# Patient Record
Sex: FEMALE | Race: WHITE | NOT HISPANIC OR LATINO | Employment: UNEMPLOYED | URBAN - METROPOLITAN AREA
[De-identification: names, ages, dates, MRNs, and addresses within clinical notes are randomized per-mention and may not be internally consistent; named-entity substitution may affect disease eponyms.]

---

## 2017-01-09 ENCOUNTER — PATIENT MESSAGE (OUTPATIENT)
Dept: NEUROLOGY | Facility: CLINIC | Age: 37
End: 2017-01-09

## 2017-01-17 ENCOUNTER — LAB VISIT (OUTPATIENT)
Dept: LAB | Facility: HOSPITAL | Age: 37
End: 2017-01-17
Attending: PSYCHIATRY & NEUROLOGY
Payer: COMMERCIAL

## 2017-01-17 DIAGNOSIS — G35 MULTIPLE SCLEROSIS: ICD-10-CM

## 2017-01-17 DIAGNOSIS — Z79.899 HIGH RISK MEDICATION USE: ICD-10-CM

## 2017-01-17 LAB
ALBUMIN SERPL BCP-MCNC: 3.2 G/DL
ALP SERPL-CCNC: 55 U/L
ALT SERPL W/O P-5'-P-CCNC: 14 U/L
AST SERPL-CCNC: 16 U/L
BASOPHILS # BLD AUTO: 0.01 K/UL
BASOPHILS NFR BLD: 0.2 %
BILIRUB DIRECT SERPL-MCNC: 0.2 MG/DL
BILIRUB SERPL-MCNC: 0.5 MG/DL
DIFFERENTIAL METHOD: ABNORMAL
EOSINOPHIL # BLD AUTO: 0 K/UL
EOSINOPHIL NFR BLD: 0.4 %
ERYTHROCYTE [DISTWIDTH] IN BLOOD BY AUTOMATED COUNT: 12.7 %
HCT VFR BLD AUTO: 39 %
HGB BLD-MCNC: 13.1 G/DL
LYMPHOCYTES # BLD AUTO: 0.3 K/UL
LYMPHOCYTES NFR BLD: 6.3 %
MCH RBC QN AUTO: 31 PG
MCHC RBC AUTO-ENTMCNC: 33.6 %
MCV RBC AUTO: 92 FL
MONOCYTES # BLD AUTO: 0.8 K/UL
MONOCYTES NFR BLD: 14.7 %
NEUTROPHILS # BLD AUTO: 4.1 K/UL
NEUTROPHILS NFR BLD: 78.4 %
PLATELET # BLD AUTO: 290 K/UL
PMV BLD AUTO: 10.5 FL
PROT SERPL-MCNC: 6.5 G/DL
RBC # BLD AUTO: 4.23 M/UL
WBC # BLD AUTO: 5.25 K/UL

## 2017-01-17 PROCEDURE — 80076 HEPATIC FUNCTION PANEL: CPT

## 2017-01-17 PROCEDURE — 85025 COMPLETE CBC W/AUTO DIFF WBC: CPT

## 2017-01-17 PROCEDURE — 36415 COLL VENOUS BLD VENIPUNCTURE: CPT

## 2017-01-18 ENCOUNTER — PATIENT MESSAGE (OUTPATIENT)
Dept: NEUROLOGY | Facility: CLINIC | Age: 37
End: 2017-01-18

## 2017-01-19 ENCOUNTER — PATIENT MESSAGE (OUTPATIENT)
Dept: NEUROLOGY | Facility: CLINIC | Age: 37
End: 2017-01-19

## 2017-01-24 ENCOUNTER — PATIENT MESSAGE (OUTPATIENT)
Dept: NEUROLOGY | Facility: CLINIC | Age: 37
End: 2017-01-24

## 2017-02-09 ENCOUNTER — LAB VISIT (OUTPATIENT)
Dept: LAB | Facility: HOSPITAL | Age: 37
End: 2017-02-09
Attending: PSYCHIATRY & NEUROLOGY
Payer: COMMERCIAL

## 2017-02-09 DIAGNOSIS — G35 MULTIPLE SCLEROSIS: ICD-10-CM

## 2017-02-09 DIAGNOSIS — Z79.899 HIGH RISK MEDICATION USE: ICD-10-CM

## 2017-02-09 LAB
ALBUMIN SERPL BCP-MCNC: 3.2 G/DL
ALP SERPL-CCNC: 52 U/L
ALT SERPL W/O P-5'-P-CCNC: 11 U/L
AST SERPL-CCNC: 15 U/L
BASOPHILS # BLD AUTO: 0.02 K/UL
BASOPHILS NFR BLD: 0.6 %
BILIRUB DIRECT SERPL-MCNC: 0.1 MG/DL
BILIRUB SERPL-MCNC: 0.4 MG/DL
DIFFERENTIAL METHOD: ABNORMAL
EOSINOPHIL # BLD AUTO: 0 K/UL
EOSINOPHIL NFR BLD: 0.9 %
ERYTHROCYTE [DISTWIDTH] IN BLOOD BY AUTOMATED COUNT: 12.8 %
HCT VFR BLD AUTO: 39.8 %
HGB BLD-MCNC: 13.3 G/DL
LYMPHOCYTES # BLD AUTO: 0.4 K/UL
LYMPHOCYTES NFR BLD: 12.7 %
MCH RBC QN AUTO: 31.1 PG
MCHC RBC AUTO-ENTMCNC: 33.4 %
MCV RBC AUTO: 93 FL
MONOCYTES # BLD AUTO: 0.5 K/UL
MONOCYTES NFR BLD: 15.7 %
NEUTROPHILS # BLD AUTO: 2.3 K/UL
NEUTROPHILS NFR BLD: 70.1 %
PLATELET # BLD AUTO: 259 K/UL
PMV BLD AUTO: 10.6 FL
PROT SERPL-MCNC: 6.5 G/DL
RBC # BLD AUTO: 4.28 M/UL
WBC # BLD AUTO: 3.31 K/UL

## 2017-02-09 PROCEDURE — 36415 COLL VENOUS BLD VENIPUNCTURE: CPT

## 2017-02-09 PROCEDURE — 80076 HEPATIC FUNCTION PANEL: CPT

## 2017-02-09 PROCEDURE — 85025 COMPLETE CBC W/AUTO DIFF WBC: CPT

## 2017-02-13 ENCOUNTER — PATIENT MESSAGE (OUTPATIENT)
Dept: NEUROLOGY | Facility: CLINIC | Age: 37
End: 2017-02-13

## 2017-02-14 ENCOUNTER — PATIENT MESSAGE (OUTPATIENT)
Dept: NEUROLOGY | Facility: CLINIC | Age: 37
End: 2017-02-14

## 2017-03-14 ENCOUNTER — LAB VISIT (OUTPATIENT)
Dept: LAB | Facility: HOSPITAL | Age: 37
End: 2017-03-14
Attending: PSYCHIATRY & NEUROLOGY
Payer: COMMERCIAL

## 2017-03-14 ENCOUNTER — OFFICE VISIT (OUTPATIENT)
Dept: NEUROLOGY | Facility: CLINIC | Age: 37
End: 2017-03-14
Payer: COMMERCIAL

## 2017-03-14 VITALS
WEIGHT: 152.19 LBS | BODY MASS INDEX: 24.46 KG/M2 | HEART RATE: 88 BPM | SYSTOLIC BLOOD PRESSURE: 131 MMHG | HEIGHT: 66 IN | DIASTOLIC BLOOD PRESSURE: 91 MMHG

## 2017-03-14 DIAGNOSIS — G35 MS (MULTIPLE SCLEROSIS): ICD-10-CM

## 2017-03-14 DIAGNOSIS — Z79.899 ENCOUNTER FOR LONG-TERM (CURRENT) USE OF HIGH-RISK MEDICATION: ICD-10-CM

## 2017-03-14 DIAGNOSIS — Z71.89 COUNSELING REGARDING GOALS OF CARE: ICD-10-CM

## 2017-03-14 DIAGNOSIS — Z79.899 HIGH RISK MEDICATION USE: ICD-10-CM

## 2017-03-14 DIAGNOSIS — M79.671 PAIN IN BOTH FEET: Primary | ICD-10-CM

## 2017-03-14 DIAGNOSIS — G35 MULTIPLE SCLEROSIS: ICD-10-CM

## 2017-03-14 DIAGNOSIS — Z29.89 PROPHYLACTIC IMMUNOTHERAPY: ICD-10-CM

## 2017-03-14 DIAGNOSIS — M79.672 PAIN IN BOTH FEET: Primary | ICD-10-CM

## 2017-03-14 LAB
ALBUMIN SERPL BCP-MCNC: 3.2 G/DL
ALP SERPL-CCNC: 52 U/L
ALT SERPL W/O P-5'-P-CCNC: 11 U/L
AST SERPL-CCNC: 14 U/L
BASOPHILS # BLD AUTO: 0.01 K/UL
BASOPHILS NFR BLD: 0.2 %
BILIRUB DIRECT SERPL-MCNC: 0.2 MG/DL
BILIRUB SERPL-MCNC: 0.4 MG/DL
DIFFERENTIAL METHOD: ABNORMAL
EOSINOPHIL # BLD AUTO: 0 K/UL
EOSINOPHIL NFR BLD: 1 %
ERYTHROCYTE [DISTWIDTH] IN BLOOD BY AUTOMATED COUNT: 13 %
HCT VFR BLD AUTO: 38.7 %
HGB BLD-MCNC: 13 G/DL
LYMPHOCYTES # BLD AUTO: 0.4 K/UL
LYMPHOCYTES NFR BLD: 9.9 %
MCH RBC QN AUTO: 30.6 PG
MCHC RBC AUTO-ENTMCNC: 33.6 %
MCV RBC AUTO: 91 FL
MONOCYTES # BLD AUTO: 0.8 K/UL
MONOCYTES NFR BLD: 19.2 %
NEUTROPHILS # BLD AUTO: 2.8 K/UL
NEUTROPHILS NFR BLD: 69.2 %
PLATELET # BLD AUTO: 299 K/UL
PMV BLD AUTO: 10.2 FL
PROT SERPL-MCNC: 6.4 G/DL
RBC # BLD AUTO: 4.25 M/UL
WBC # BLD AUTO: 4.06 K/UL

## 2017-03-14 PROCEDURE — 80076 HEPATIC FUNCTION PANEL: CPT

## 2017-03-14 PROCEDURE — 36415 COLL VENOUS BLD VENIPUNCTURE: CPT

## 2017-03-14 PROCEDURE — 99999 PR PBB SHADOW E&M-EST. PATIENT-LVL III: CPT | Mod: PBBFAC,,, | Performed by: PSYCHIATRY & NEUROLOGY

## 2017-03-14 PROCEDURE — 99215 OFFICE O/P EST HI 40 MIN: CPT | Mod: S$GLB,,, | Performed by: PSYCHIATRY & NEUROLOGY

## 2017-03-14 PROCEDURE — 85025 COMPLETE CBC W/AUTO DIFF WBC: CPT

## 2017-03-14 PROCEDURE — 1160F RVW MEDS BY RX/DR IN RCRD: CPT | Mod: S$GLB,,, | Performed by: PSYCHIATRY & NEUROLOGY

## 2017-03-14 RX ORDER — MULTIVIT WITH MINERALS/HERBS
1 TABLET ORAL DAILY
COMMUNITY

## 2017-03-14 RX ORDER — LISINOPRIL 40 MG/1
40 TABLET ORAL DAILY
Refills: 5 | COMMUNITY
Start: 2017-03-09

## 2017-03-14 NOTE — PROGRESS NOTES
"Subjective:       Patient ID: Camila Dawn is a 36 y.o. female who presents today for a routine clinic visit for MS.    MS HPI:  · DMT: Gilenya 5 days/week (skips the weekend)  · Side effects from DMT? No  · Taking vitamin D3 as recommended? Yes -  Dose:  4,000 IU daily  · She has developed a crick in the back of her right neck--slept on it wrong. She is reluctant to start a muscle relaxer at this time. Plans to get a massage  · She has recently started a new BP med--lisinopril.   · Has not worked past 1.5 years. Is a CPA, and also licensed in fraud auditing.     SOCIAL HISTORY  Social History   Substance Use Topics    Smoking status: Never Smoker    Smokeless tobacco: Not on file    Alcohol use Not on file     Living arrangements - the patient lives with her .  Employment N/A    MS ROS:  · Fatigue: Yes - has narcolepsy; on Xyrem; on Ritalin  · Sleep Disturbance: Yes - narcolepsy;   · Bladder Dysfunction: No  · Bowel Dysfunction: No  · Spasticity: No  · Visual Symptoms: No--will see Dr. Preston in May;   · Cognitive: Yes -   · Mood Disorder: Yes -   · Gait Disturbance: No  · Falls: No  · Hand Dysfunction: No  · Pain: Yes - has "aches and pains"; her feet ache "all the time". "been like this forever."  · Sexual Dysfunction: Not Assessed  · Skin Breakdown: No  · Tremors: No  · Dysphagia:  No  · Dysarthria:  No  · Heat sensitivity:  Yes - some  · Any un-met adaptive needs? No  · Copay Assist?  Yes -   · Clinical Trial candidate? No        Objective:      25 foot timed walk: 3.3 seconds without assist;   Timed 25 Foot Walk: 11/14/2016   Did patient wear an AFO? No   Was assistive device used? No   Time for 25 Foot Walk (seconds) 3.4   Time for 25 Foot Walk (seconds) 3.2     Neurologic Exam      MENTAL STATUS: grossly intact  CRANIAL NERVE EXAM: There is no internuclear ophthalmoplegia. Extraocular   muscles are intact.  No facial   asymmetry.There is no dysarthria.   MOTOR EXAM: Normal bulk and tone " throughout UE and LE bilaterally. Rapid sequential movements are normal. Strength is 5/5 in all groups   in the lower extremities and upper extremities.   REFLEXES: Symmetric and 2+ throughout in all 4 extremities.   SENSORY EXAM: Normal to vibration t/o  COORDINATION: Normal finger-to-nose exam.   GAIT: Narrow based and stable.        Labs:     Lab Results   Component Value Date    WBC 4.06 03/14/2017    HGB 13.0 03/14/2017    HCT 38.7 03/14/2017    MCV 91 03/14/2017     03/14/2017       Lab Results   Component Value Date    [id number] 57 11/14/2016     Diagnosis/Assessment/Plan:    1. Multiple Sclerosis  · Assessment: She is clinically stable on Gilenya 5 days/week.   · Imaging: planned in June 2017  · Disease Modifying Therapies: continue Gilenya; we discussed ocrelizumab due to long term safety concerns with Gilenya, but she prefers to stay course with Gilenya for now.     2. MS Symptom Assessment / Management  · Sleep Disturbance: managed by sleep medicine  · Visual Symptoms: will see Dr. Preston in May  · Pain: continue to monitor        3. Neck spasm--she defers muscle relaxer currently, and will try massage. If this not helpful, she will contact us, and we can prescribe valium or baclofen.       Over 50% of this 40 minute visit was spent in direct face to face counseling of the patient about her MS and the management of her various symptoms.       F/u with Nicole Barba PA-C in 4mo  Pain in both feet  -     Ambulatory Referral to Podiatry    MS (multiple sclerosis)  -     CBC auto differential; Future; Expected date: 3/14/17  -     Hepatic function panel; Future  -     MRI Brain W WO Contrast; Future

## 2017-03-14 NOTE — MR AVS SNAPSHOT
[facility]  [address]  [facility] [address]  Phone: [phone]                  Camila Dawn   3/14/2017 3:00 PM   Office Visit    Description:  Female : 1980   Provider:  Jazmine Lundy MD   Department:  [facility]           Reason for Visit     Medication Problem           Diagnoses this Visit        Comments    Pain in both feet    -  Primary     MS (multiple sclerosis)                To Do List           Future Appointments        Provider Department Dept Phone    3/14/2017 4:45 PM LAB, [facility] [phone]    2017 9:00 AM Devan Preston MD [facility] - Ophthalmology [phone]    2017 9:15 AM STA MRI1 [facility] [phone]    2017 1:45 PM Nicole Barba PA-C [facility] Multiple Sclerosis [phone]      Goals (5 Years of Data)     None      Follow-Up and Disposition     Return in about 4 months (around 2017).      [facility]     [facility] Nurse Care Line -  Assistance  Registered nurses in the [facility] provide clinical advisement, health education, appointment booking, and other advisory services.  Call for this free service at [phone].             Medications           Message regarding Medications     Verify the changes and/or additions to your medication regime listed below are the same as discussed with your clinician today.  If any of these changes or additions are incorrect, please notify your healthcare provider.             Verify that the below list of medications is an accurate representation of the medications you are currently taking.  If none reported, the list may be blank. If incorrect, please contact your healthcare provider. Carry this list with you in case of emergency.           Current Medications     b complex vitamins tablet Take 1 tablet by mouth once daily.    cetirizine (ZYRTEC) 5 MG tablet Take 5  "mg by mouth once daily.    fingolimod (GILENYA) 0.5 mg Cap Take 0.5 mg by mouth once daily.    FLAXSEED ORAL Take by mouth.    lisinopril (PRINIVIL,ZESTRIL) 40 MG tablet     METFORMIN HCL (METFORMIN ORAL) Take by mouth.    methylphenidate (RITALIN) 10 MG tablet Take 10 mg by mouth 2 (two) times daily. Takes 3 AM, 2 late morning and 2 mid afternoon    multivitamin (ONE DAILY MULTIVITAMIN) per tablet Take 1 tablet by mouth once daily.    naproxen (NAPROSYN) 250 MG tablet Take 250 mg by mouth 2 (two) times daily.    sodium oxybate (XYREM) 500 mg/mL Soln Take by mouth.    triamcinolone acetonide 0.1% (KENALOG) 0.1 % cream Apply topically continuous prn.     vitamin D 1000 units Tab Take 4,000 Units by mouth once daily.            Clinical Reference Information           Your Vitals Were     BP Pulse Height Weight BMI    131/91 88 5' 6" (1.676 m) 69 kg (152 lb 3.2 oz) 24.57 kg/m2      Blood Pressure          Most Recent Value    BP  (!)  131/91      Allergies as of 3/14/2017     Ragweed    Sulfa (Sulfonamide Antibiotics)      Immunizations Administered on Date of Encounter - 3/14/2017     None      Orders Placed During Today's Visit      Normal Orders This Visit    Ambulatory Referral to Podiatry     Future Labs/Procedures Expected by Expires    CBC auto differential  3/14/2017 5/13/2018    Hepatic function panel  As directed 3/14/2018    MRI Brain W WO Contrast  As directed 6/14/2018      Language Assistance Services     ATTENTION: Language assistance services are available, free of charge. Please call [phone].      ATENCIÓN: Si ros sumner, tiene a castellanos disposición servicios gratuitos de asistencia lingüística. Llame al [phone].     RIP Ý: N?u b?n nói Ti?ng Vi?t, có các d?ch v? h? tr? ngôn ng? mi?n phí dành cho b?n. G?i s? [phone].         [facility] complies with applicable Federal civil rights laws and does not discriminate on the basis of race, color, national origin, age, " disability, or sex.

## 2017-03-15 ENCOUNTER — PATIENT MESSAGE (OUTPATIENT)
Dept: NEUROLOGY | Facility: CLINIC | Age: 37
End: 2017-03-15

## 2017-03-15 NOTE — TELEPHONE ENCOUNTER
"I don't have any documentation on the "2017 CD Upload Spreadsheet" showing that we received MRI disc.  I have verified with the Film Library that abt was sent for upload   "

## 2017-03-15 NOTE — TELEPHONE ENCOUNTER
----- Message from Jazmine Lundy MD sent at 3/14/2017  4:30 PM CDT -----  meaghan Rios checking your log to see if we received outside MRIs on Camila Dawn? thanks

## 2017-03-16 DIAGNOSIS — G35 MULTIPLE SCLEROSIS: Primary | ICD-10-CM

## 2017-03-16 RX ORDER — FINGOLIMOD HYDROCHLORIDE 0.5 MG/1
0.5 CAPSULE ORAL DAILY
Qty: 180 CAPSULE | Refills: 1 | Status: SHIPPED | OUTPATIENT
Start: 2017-03-16 | End: 2018-04-04 | Stop reason: SDUPTHER

## 2017-03-20 LAB
JCPYV AB SERPL QL IA: NEGATIVE
JCV INDEX: 0.17

## 2017-03-21 ENCOUNTER — PATIENT MESSAGE (OUTPATIENT)
Dept: NEUROLOGY | Facility: CLINIC | Age: 37
End: 2017-03-21

## 2017-03-29 ENCOUNTER — PATIENT MESSAGE (OUTPATIENT)
Dept: NEUROLOGY | Facility: CLINIC | Age: 37
End: 2017-03-29

## 2017-03-30 ENCOUNTER — DOCUMENTATION ONLY (OUTPATIENT)
Dept: NEUROLOGY | Facility: CLINIC | Age: 37
End: 2017-03-30

## 2017-03-30 NOTE — PROGRESS NOTES
Informed patient that we have received Brain MRI report/disc from [facility] on 3/30/2017.  Placed on KK desk.

## 2017-04-03 NOTE — PROGRESS NOTES
Disc received from [facility]  MRI brain from 6/2016, 12/2015, 6/2015, and 10/2014  Will send for upload into EPIC

## 2017-04-06 ENCOUNTER — PATIENT MESSAGE (OUTPATIENT)
Dept: NEUROLOGY | Facility: CLINIC | Age: 37
End: 2017-04-06

## 2017-04-11 ENCOUNTER — TELEPHONE (OUTPATIENT)
Dept: NEUROLOGY | Facility: CLINIC | Age: 37
End: 2017-04-11

## 2017-04-11 ENCOUNTER — PATIENT MESSAGE (OUTPATIENT)
Dept: NEUROLOGY | Facility: CLINIC | Age: 37
End: 2017-04-11

## 2017-04-11 NOTE — TELEPHONE ENCOUNTER
Camila sent the following:      Dr. Yamil Huffman has changed my prescription to Modafinil instead of Methylphenidate. The insurance plan I'm on finally covers it. I will start taking it tomorrow. I'm hoping this means my BP will fall, and then I won't even need the Lisinopril anymore. I will still be keeping track of my BP. I will keep you updated if anything else changes or issues.   Thanks!

## 2017-04-12 RX ORDER — MODAFINIL 200 MG/1
200 TABLET ORAL DAILY
COMMUNITY
End: 2021-09-09 | Stop reason: SDUPTHER

## 2017-05-09 ENCOUNTER — CLINICAL SUPPORT (OUTPATIENT)
Dept: OPHTHALMOLOGY | Facility: CLINIC | Age: 37
End: 2017-05-09
Payer: COMMERCIAL

## 2017-05-09 ENCOUNTER — INITIAL CONSULT (OUTPATIENT)
Dept: OPHTHALMOLOGY | Facility: CLINIC | Age: 37
End: 2017-05-09
Payer: COMMERCIAL

## 2017-05-09 DIAGNOSIS — G35 MULTIPLE SCLEROSIS: Primary | ICD-10-CM

## 2017-05-09 DIAGNOSIS — H47.293 PARTIAL OPTIC ATROPHY OF BOTH EYES: ICD-10-CM

## 2017-05-09 DIAGNOSIS — Z79.899 LONG-TERM CURRENT USE OF HIGH RISK MEDICATION OTHER THAN ANTICOAGULANT: ICD-10-CM

## 2017-05-09 PROCEDURE — 92083 EXTENDED VISUAL FIELD XM: CPT | Mod: S$GLB,,, | Performed by: OPHTHALMOLOGY

## 2017-05-09 PROCEDURE — 99999 PR PBB SHADOW E&M-EST. PATIENT-LVL II: CPT | Mod: PBBFAC,,, | Performed by: OPHTHALMOLOGY

## 2017-05-09 PROCEDURE — 92004 COMPRE OPH EXAM NEW PT 1/>: CPT | Mod: S$GLB,,, | Performed by: OPHTHALMOLOGY

## 2017-05-09 PROCEDURE — 92133 CPTRZD OPH DX IMG PST SGM ON: CPT | Mod: S$GLB,,, | Performed by: OPHTHALMOLOGY

## 2017-05-09 RX ORDER — METFORMIN HYDROCHLORIDE 1000 MG/1
TABLET ORAL
Refills: 5 | COMMUNITY
Start: 2017-03-05

## 2017-05-09 RX ORDER — DROSPIRENONE AND ETHINYL ESTRADIOL 0.03MG-3MG
KIT ORAL
Refills: 5 | COMMUNITY
Start: 2017-03-23 | End: 2017-07-18

## 2017-05-09 NOTE — MR AVS SNAPSHOT
[facility]  [address]  [facility] [address]  Phone: [phone]  Fax: [phone]                  Camila Dawn   2017 9:00 AM   Initial consult    Description:  Female : 1980   Provider:  Devan Preston MD   Department:  [facility]           Reason for Visit     Concerns About Ocular Health           Diagnoses this Visit        Comments    Multiple sclerosis    -  Primary     Partial optic atrophy of both eyes         Long-term current use of high risk medication other than anticoagulant                To Do List           Future Appointments        Provider Department Dept Phone    2017 9:15 AM [facility] MRI1 [facility] [phone]    2017 1:45 PM [facility] [phone]      Goals (5 Years of Data)     None      Follow-Up and Disposition     Return in about 1 year (around 2018) for Exam, HVF, and macular oct.      [facility]     [facility] Care [facility] -  Assistance  Unless otherwise directed by your provider, please contact [facility], our nurse care line that is available for  assistance.     Registered nurses in the [facility] provide: appointment scheduling, clinical advisement, health education, and other advisory services.  Call: [phone] (toll free)               Medications           Message regarding Medications     Verify the changes and/or additions to your medication regime listed below are the same as discussed with your clinician today.  If any of these changes or additions are incorrect, please notify your healthcare provider.             Verify that the below list of medications is an accurate representation of the medications you are currently taking.  If none reported, the list may be blank. If incorrect, please contact your healthcare provider. Carry this list with you in case of emergency.           Current  Medications     b complex vitamins tablet Take 1 tablet by mouth once daily.    cetirizine (ZYRTEC) 5 MG tablet Take 5 mg by mouth once daily.    fingolimod (GILENYA) 0.5 mg Cap Take 1 capsule (0.5 mg total) by mouth once daily.    FLAXSEED ORAL Take by mouth.    lisinopril (PRINIVIL,ZESTRIL) 40 MG tablet     metformin (GLUCOPHAGE) 1000 MG tablet TK 1 T PO  BID    modafinil (PROVIGIL) 200 MG Tab Take 200 mg by mouth once daily. Pt takes 100 mg PO Q AM and 100 mg PO at noon.    multivitamin (ONE DAILY MULTIVITAMIN) per tablet Take 1 tablet by mouth once daily.    naproxen (NAPROSYN) 250 MG tablet Take 250 mg by mouth 2 (two) times daily.    sodium oxybate (XYREM) 500 mg/mL Soln Take by mouth.    triamcinolone acetonide 0.1% (KENALOG) 0.1 % cream Apply topically continuous prn.     vitamin D 1000 units Tab Take 4,000 Units by mouth once daily.     GUS 3-0.03 mg per tablet TK 1 T PO QD           Clinical Reference Information           Allergies as of 5/9/2017     Ragweed    Sulfa (Sulfonamide Antibiotics)      Immunizations Administered on Date of Encounter - 5/9/2017     None      Orders Placed During Today's Visit      Normal Orders This Visit    Hoover Visual Field - OU - Extended - Both Eyes     Posterior Segment OCT Optic Nerve- Both eyes       Instructions    Repeat exam, visual field testing, and OCT in one year.       Language Assistance Services     ATTENTION: Language assistance services are available, free of charge. Please call [phone].      ATENCIÓN: Si ros sumner, tiene a castellanos disposición servicios gratuitos de asistencia lingüística. Llame al [phone].     [facility] Ý: N?u b?n nói Ti?ng Vi?t, có các d?ch v? h? tr? ngôn ng? mi?n phí dành cho b?n. G?i s? [phone].         [facility] complies with applicable Federal civil rights laws and does not discriminate on the basis of race, color, national origin, age, disability, or sex.

## 2017-05-09 NOTE — PROGRESS NOTES
HPI     Concerns About Ocular Health    Additional comments: Multiple Sclerosis           Comments   Referred by Dr.Bagert Silveira Evaluation  Patient here establishing care moving from [place]. Dx w/multiple sclerosis   since 2010. 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   Pt states taking Gilenya past year an half without any problems at this   time.  Hx of OS loss vision left   Peripherally which all came back.  No eye drops. No pain.    Notes in Media from [facility].    I have personally interviewed the patient, reviewed the history and   examined the patient and agree with the technician's exam.           Last edited by Devan Preston MD on 5/9/2017 10:35 AM. (History)        ROS     Positive for: Neurological, Eyes    Negative for: Constitutional, Gastrointestinal, Skin, Genitourinary,   Musculoskeletal, HENT, Endocrine, Cardiovascular, Respiratory,   Psychiatric, Allergic/Imm, Heme/Lymph    Last edited by Devan Preston MD on 5/9/2017 10:24 AM. (History)        Assessment /Plan     For exam results, see Encounter Report.    Multiple sclerosis  -     Posterior Segment OCT Optic Nerve- Both eyes  -     Hoover Visual Field - OU - Extended - Both Eyes    Partial optic atrophy of both eyes  -     Posterior Segment OCT Optic Nerve- Both eyes  -     Hoover Visual Field - OU - Extended - Both Eyes    Long-term current use of high risk medication other than anticoagulant  -     Posterior Segment OCT Optic Nerve- Both eyes      No evidence of Gilenya toxicity to retinas. Old partial optic atrophy in both eyes unchanged from prior exam in [place]. Repeat exam and OCT in one year.

## 2017-05-09 NOTE — LETTER
May 9, 2017      Jazmine Lundy MD  [address]  [facility] [address]           [facility] - Ophthalmology  [address]  [facility] [address]  Phone: [phone]  Fax: [phone]          Patient: Camila Dawn   MR Number: [id number]   YOB: 1980   Date of Visit: 5/9/2017       Dear Dr. Jazmine Lundy:    Thank you for referring Camila Dawn to me for evaluation. Attached you will find relevant portions of my assessment and plan of care.    If you have questions, please do not hesitate to call me. I look forward to following Camila Dawn along with you.    Sincerely,    Devan Preston MD    Enclosure  CC:  No Recipients    If you would like to receive this communication electronically, please contact [email or web address] or [phone] to request more information on [employer] access.    For providers and/or their staff who would like to refer a patient to [facility], please contact us through our one-stop-shop provider referral line, [facility], at [phone].    If you feel you have received this communication in error or would no longer like to receive these types of communications, please e-mail [email or web address]

## 2017-06-13 ENCOUNTER — PATIENT MESSAGE (OUTPATIENT)
Dept: NEUROLOGY | Facility: CLINIC | Age: 37
End: 2017-06-13

## 2017-06-14 ENCOUNTER — HOSPITAL ENCOUNTER (OUTPATIENT)
Dept: RADIOLOGY | Facility: HOSPITAL | Age: 37
Discharge: HOME | End: 2017-06-14
Attending: PSYCHIATRY & NEUROLOGY
Payer: COMMERCIAL

## 2017-06-14 DIAGNOSIS — G35 MS (MULTIPLE SCLEROSIS): ICD-10-CM

## 2017-06-14 PROCEDURE — 70553 MRI BRAIN STEM W/O & W/DYE: CPT | Mod: 26,,, | Performed by: RADIOLOGY

## 2017-06-14 PROCEDURE — 70553 MRI BRAIN STEM W/O & W/DYE: CPT | Mod: TC

## 2017-06-14 PROCEDURE — A9585 GADOBUTROL INJECTION: HCPCS | Performed by: PSYCHIATRY & NEUROLOGY

## 2017-06-14 PROCEDURE — 25500020 PHARM REV CODE 255: Performed by: PSYCHIATRY & NEUROLOGY

## 2017-06-14 RX ORDER — GADOBUTROL 604.72 MG/ML
6 INJECTION INTRAVENOUS
Status: COMPLETED | OUTPATIENT
Start: 2017-06-14 | End: 2017-06-14

## 2017-06-14 RX ADMIN — GADOBUTROL 6 ML: 604.72 INJECTION INTRAVENOUS at 09:06

## 2017-07-05 ENCOUNTER — PATIENT MESSAGE (OUTPATIENT)
Dept: NEUROLOGY | Facility: CLINIC | Age: 37
End: 2017-07-05

## 2017-07-18 ENCOUNTER — OFFICE VISIT (OUTPATIENT)
Dept: NEUROLOGY | Facility: CLINIC | Age: 37
End: 2017-07-18
Payer: COMMERCIAL

## 2017-07-18 VITALS
SYSTOLIC BLOOD PRESSURE: 132 MMHG | HEART RATE: 75 BPM | HEIGHT: 66 IN | WEIGHT: 155 LBS | DIASTOLIC BLOOD PRESSURE: 86 MMHG | BODY MASS INDEX: 24.91 KG/M2

## 2017-07-18 DIAGNOSIS — E55.9 VITAMIN D INSUFFICIENCY: ICD-10-CM

## 2017-07-18 DIAGNOSIS — G35 MULTIPLE SCLEROSIS: Primary | ICD-10-CM

## 2017-07-18 PROCEDURE — 3008F BODY MASS INDEX DOCD: CPT | Mod: S$GLB,,, | Performed by: PHYSICIAN ASSISTANT

## 2017-07-18 PROCEDURE — 99214 OFFICE O/P EST MOD 30 MIN: CPT | Mod: S$GLB,,, | Performed by: PHYSICIAN ASSISTANT

## 2017-07-18 PROCEDURE — 99999 PR PBB SHADOW E&M-EST. PATIENT-LVL III: CPT | Mod: PBBFAC,,, | Performed by: PHYSICIAN ASSISTANT

## 2017-07-18 RX ORDER — NORGESTIMATE AND ETHINYL ESTRADIOL 7DAYSX3 LO
1 KIT ORAL DAILY
COMMUNITY
End: 2017-07-20

## 2017-07-18 NOTE — PROGRESS NOTES
"Subjective:       Patient ID: Camila Dawn is a 37 y.o. female who presents today with her  for a routine clinic visit for MS.    MS HPI:  · DMT: Gilenya(M-F)  · Side effects from DMT? No  · Taking vitamin D3 as recommended? Yes Dose: 4,000  · Feels stable    SOCIAL HISTORY  Social History   Substance Use Topics    Smoking status: Never Smoker    Smokeless tobacco: Not on file    Alcohol use Yes      Comment: rarely     Living arrangements - with   Employment - Not at this time.    MS ROS:  · Fatigue: Yes - nacolepsy, taking xyrem, Provigil  · Bladder Dysfunction: No  · Bowel Dysfunction: No  · Spasticity: No  · Visual Symptoms: Yes - history of ON--sees Dr. Preston for Gilenya management.(last seen May 2017)  · Cognitive: No  · Mood Disorder: No  · Gait Disturbance: No  · Falls: No  · Hand Dysfunction: No  · Pain: Yes - "aches and pains"  · Sexual Dysfunction: Not Assessed  · Skin Breakdown: No  · Tremors: No  · Dysphagia:  No  · Dysarthria:  No  · Heat sensitivity:  Yes---limits outdoor activity  · Any un-met adaptive needs? No  · Copay Assist?  Yes   · Clinical Trial candidate? No      Objective:        1. 25 foot timed walk: 3.3s  Timed 25 Foot Walk: 11/14/2016 7/18/2017   Did patient wear an AFO? No No   Was assistive device used? No No   Time for 25 Foot Walk (seconds) 3.4 3.6   Time for 25 Foot Walk (seconds) 3.2 -       3. 9 Hole Peg Test:  9-Hole Peg Test: 11/14/2016   Dominant Hand Right   Time (seconds) -  Trial 1 18   Time (seconds) -  Trial 2 17   Time (seconds) - ND Trial 1 20   Time (seconds) - ND Trial 2 19       Neurologic Exam     Mental Status   Oriented to person, place, and time.   Follows 3 step commands.   Speech: speech is normal   Level of consciousness: alert  Normal comprehension.     Cranial Nerves     CN II   Visual acuity: normal with correction (20/20 OD and OS correctedwith Snellen hand held chart at 6 ft)    CN III, IV, VI   Pupils are equal, round, and " reactive to light.  Extraocular motions are normal.     CN V   Facial sensation intact.     CN VII   Facial expression full, symmetric.     CN VIII   Hearing: intact (finger rub)    CN IX, X   Palate: symmetric    CN XI   CN XI normal.     CN XII   Tongue deviation: none    Motor Exam   Muscle bulk: normal  Overall muscle tone: normal    Strength   Right deltoid: 5/5  Left deltoid: 5/5  Right triceps: 5/5  Left triceps: 5/5  Right wrist extension: 5/5  Left wrist extension: 5/5  Right interossei: 5/5  Left interossei: 5/5  Right iliopsoas: 5/5  Left iliopsoas: 5/5  Right hamstrin/5  Left hamstrin/5  Right anterior tibial: 5/5  Left anterior tibial: 5/5  Right peroneal: 5/5  Left peroneal: 5/5    Sensory Exam   Light touch normal.   Right arm vibration: normal  Left arm vibration: normal  Right leg vibration: decreased from toes  Left leg vibration: decreased from toes    Gait, Coordination, and Reflexes     Gait  Gait: normal    Coordination   Finger to nose coordination: normal  Tandem walking coordination: normal    Tremor   Resting tremor: absent  Action tremor: absent    Reflexes   Right brachioradialis: 2+  Left brachioradialis: 2+  Right biceps: 2+  Left biceps: 2+  Right triceps: 2+  Left triceps: 2+  Right patellar: 2+  Left patellar: 2+  Right achilles: 2+  Left achilles: 2+  Right plantar: normal  Left plantar: normal  Right ankle clonus: absent  Left ankle clonus: absent  Right pendular knee jerk: absent  Left pendular knee jerk: absentNormal Heel/toe walk  Normal RSM             Imaging:     Results for orders placed during the hospital encounter of 17   MRI Brain W WO Contrast    Narrative Comparison: 2016    Technique: Multiplanar multisequence images of the brain were obtained without and with contrast enhancement in the MS protocol.    Results: No abnormal diffusion restriction is identified to suggest an acute infarction and no abnormal gradient susceptibility is identified.   Again noted are foci of T2/flair signal abnormality within the supratentorial white matter, with the largest lesion adjacent to the posterior margin of the corpus callosum measuring 1.2 cm, stable.  These foci of signal abnormality are oriented perpendicular to the ventricular system and there is signal abnormality in the corpus callosum.  Overall, the findings are unchanged as compared to the prior study of 6/17/2016.  No infratentorial signal abnormality is identified.  No associated lesional enhancement is identified.  No mass or midline shift is seen.  The ventricles are stable in size and configuration without hydrocephalus.  No extra-axial fluid collections.  The visualized paranasal sinuses including the mastoid air cells are clear.  Expected intracranial flow voids are demonstrated.    Impression Scattered foci of T2/flair signal on amount in the supratentorial white matter, oriented perpendicular to the ventricular system, without evidence for postcontrast enhancement.  In a patient with a history of multiple sclerosis, findings are suggestive for demyelinating plaque.  No enhancement is seen to suggest active demyelination and no new lesion is detected.  Overall, the plaque burden is unchanged as compared to the previous study of 2016.      Electronically signed by: Laura Johansen MD  Date:     06/14/17  Time:    11:18          Labs:   Ordered CBC, Vit D, CMP      Lab Results   Component Value Date    WBC 4.06 03/14/2017    RBC 4.25 03/14/2017    HGB 13.0 03/14/2017    HCT 38.7 03/14/2017    MCV 91 03/14/2017    MCH 30.6 03/14/2017    MCHC 33.6 03/14/2017    RDW 13.0 03/14/2017     03/14/2017    MPV 10.2 03/14/2017    GRAN 2.8 03/14/2017    GRAN 69.2 03/14/2017    LYMPH 0.4 (L) 03/14/2017    LYMPH 9.9 (L) 03/14/2017    MONO 0.8 03/14/2017    MONO 19.2 (H) 03/14/2017    EOS 0.0 03/14/2017    BASO 0.01 03/14/2017    EOSINOPHIL 1.0 03/14/2017    BASOPHIL 0.2 03/14/2017     Lab Results   Component Value  Date    ALT 11 03/14/2017    AST 14 03/14/2017    ALKPHOS 52 (L) 03/14/2017    BILITOT 0.4 03/14/2017         Lab Results   Component Value Date    AMRGIBXK69QR 57 11/14/2016     Lab Results   Component Value Date    JCVINDEX 0.17 03/14/2017    JCVANTIBODY Negative 03/14/2017     No results found for: [id number], ABSOLUTECD3, TR3EPENX, ABSOLUTECD8, IZ4VYUWW, ABSOLUTECD4, LABCD48    Diagnosis/Assessment/Plan:    1. Multiple Sclerosis  · Assessment: Patient is clinically and radiographically stable on Gilenya  · Imaging: Stable as noted above, reviewed images with patient in clinic today. Will plan for yearly MRI in June 2018  · Disease Modifying Therapies:Continue Gilenya(ALC-401) and high dose Vit D3. Will plan for safety labs in 2 months along with vit D3 level([facility])    2. MS Symptom Assessment / Management   No changes made to Symptom management today    Over 50% of this 30 minute visit was spent in direct face to face counseling of the patient and her  regarding her current symptoms and management of same, review of MRI's.  Follow up in 6 months  Patient agreed to POC today.    Attending, Dr. Lundy, was available during today's encounter. Any change to plan along with cosign to appear in the EMR.     Nicole Barba PA-C  [facility]    Multiple sclerosis  -     CBC auto differential; Future; Expected date: 07/18/2017  -     Comprehensive metabolic panel; Future; Expected date: 07/18/2017  -     Vitamin D; Future    Vitamin D insufficiency  -     Vitamin D; Future

## 2017-07-20 ENCOUNTER — TELEPHONE (OUTPATIENT)
Dept: NEUROLOGY | Facility: CLINIC | Age: 37
End: 2017-07-20

## 2017-07-20 ENCOUNTER — PATIENT MESSAGE (OUTPATIENT)
Dept: NEUROLOGY | Facility: CLINIC | Age: 37
End: 2017-07-20

## 2017-07-20 RX ORDER — NORGESTIMATE AND ETHINYL ESTRADIOL 7DAYSX3 28
1 KIT ORAL DAILY
COMMUNITY

## 2017-09-02 ENCOUNTER — PATIENT MESSAGE (OUTPATIENT)
Dept: NEUROLOGY | Facility: CLINIC | Age: 37
End: 2017-09-02

## 2017-09-05 ENCOUNTER — PATIENT MESSAGE (OUTPATIENT)
Dept: NEUROLOGY | Facility: CLINIC | Age: 37
End: 2017-09-05

## 2017-09-19 ENCOUNTER — LAB VISIT (OUTPATIENT)
Dept: LAB | Facility: HOSPITAL | Age: 37
End: 2017-09-19
Attending: FAMILY MEDICINE
Payer: COMMERCIAL

## 2017-09-19 DIAGNOSIS — G35 MULTIPLE SCLEROSIS: ICD-10-CM

## 2017-09-19 DIAGNOSIS — E55.9 VITAMIN D INSUFFICIENCY: ICD-10-CM

## 2017-09-19 LAB
25(OH)D3+25(OH)D2 SERPL-MCNC: 95 NG/ML
ALBUMIN SERPL BCP-MCNC: 3.2 G/DL
ALP SERPL-CCNC: 49 U/L
ALT SERPL W/O P-5'-P-CCNC: 20 U/L
ANION GAP SERPL CALC-SCNC: 9 MMOL/L
AST SERPL-CCNC: 22 U/L
BASOPHILS # BLD AUTO: 0.01 K/UL
BASOPHILS NFR BLD: 0.2 %
BILIRUB SERPL-MCNC: 0.4 MG/DL
BUN SERPL-MCNC: 12 MG/DL
CALCIUM SERPL-MCNC: 9.6 MG/DL
CHLORIDE SERPL-SCNC: 104 MMOL/L
CO2 SERPL-SCNC: 26 MMOL/L
CREAT SERPL-MCNC: 0.7 MG/DL
DIFFERENTIAL METHOD: ABNORMAL
EOSINOPHIL # BLD AUTO: 0.1 K/UL
EOSINOPHIL NFR BLD: 1.2 %
ERYTHROCYTE [DISTWIDTH] IN BLOOD BY AUTOMATED COUNT: 12.6 %
EST. GFR  (AFRICAN AMERICAN): >60 ML/MIN/1.73 M^2
EST. GFR  (NON AFRICAN AMERICAN): >60 ML/MIN/1.73 M^2
GLUCOSE SERPL-MCNC: 85 MG/DL
HCT VFR BLD AUTO: 39.8 %
HGB BLD-MCNC: 13.1 G/DL
LYMPHOCYTES # BLD AUTO: 0.6 K/UL
LYMPHOCYTES NFR BLD: 13.3 %
MCH RBC QN AUTO: 31.1 PG
MCHC RBC AUTO-ENTMCNC: 32.9 G/DL
MCV RBC AUTO: 95 FL
MONOCYTES # BLD AUTO: 0.7 K/UL
MONOCYTES NFR BLD: 15.6 %
NEUTROPHILS # BLD AUTO: 3 K/UL
NEUTROPHILS NFR BLD: 69.7 %
PLATELET # BLD AUTO: 273 K/UL
PMV BLD AUTO: 10.6 FL
POTASSIUM SERPL-SCNC: 4.4 MMOL/L
PROT SERPL-MCNC: 6.6 G/DL
RBC # BLD AUTO: 4.21 M/UL
SODIUM SERPL-SCNC: 139 MMOL/L
WBC # BLD AUTO: 4.29 K/UL

## 2017-09-19 PROCEDURE — 85025 COMPLETE CBC W/AUTO DIFF WBC: CPT

## 2017-09-19 PROCEDURE — 36415 COLL VENOUS BLD VENIPUNCTURE: CPT

## 2017-09-19 PROCEDURE — 82306 VITAMIN D 25 HYDROXY: CPT

## 2017-09-19 PROCEDURE — 80053 COMPREHEN METABOLIC PANEL: CPT

## 2017-10-03 ENCOUNTER — PATIENT MESSAGE (OUTPATIENT)
Dept: NEUROLOGY | Facility: CLINIC | Age: 37
End: 2017-10-03

## 2017-10-04 ENCOUNTER — TELEPHONE (OUTPATIENT)
Dept: NEUROLOGY | Facility: CLINIC | Age: 37
End: 2017-10-04

## 2017-10-04 NOTE — TELEPHONE ENCOUNTER
----- Message from Robb Walton sent at 10/4/2017  8:08 AM CDT -----  Contact: Self @ [phone]  Pt would like to schedule f/u appointment with the doctor. Pls call.

## 2017-12-22 ENCOUNTER — TELEPHONE (OUTPATIENT)
Dept: NEUROLOGY | Facility: CLINIC | Age: 37
End: 2017-12-22

## 2017-12-22 ENCOUNTER — PATIENT MESSAGE (OUTPATIENT)
Dept: NEUROLOGY | Facility: CLINIC | Age: 37
End: 2017-12-22

## 2017-12-22 NOTE — TELEPHONE ENCOUNTER
----- Message from Robb Walton sent at 12/22/2017  3:04 PM CST -----  Contact: Self @ [phone]  Pt is returning a call about rescheduling an appt w/ Dr. Lundy. Pls call.

## 2018-01-22 ENCOUNTER — OFFICE VISIT (OUTPATIENT)
Dept: NEUROLOGY | Facility: CLINIC | Age: 38
End: 2018-01-22
Payer: COMMERCIAL

## 2018-01-22 ENCOUNTER — LAB VISIT (OUTPATIENT)
Dept: LAB | Facility: HOSPITAL | Age: 38
End: 2018-01-22
Attending: PSYCHIATRY & NEUROLOGY
Payer: COMMERCIAL

## 2018-01-22 VITALS
HEART RATE: 84 BPM | SYSTOLIC BLOOD PRESSURE: 117 MMHG | BODY MASS INDEX: 23.78 KG/M2 | WEIGHT: 148 LBS | HEIGHT: 66 IN | DIASTOLIC BLOOD PRESSURE: 85 MMHG

## 2018-01-22 DIAGNOSIS — Z71.89 COUNSELING REGARDING GOALS OF CARE: ICD-10-CM

## 2018-01-22 DIAGNOSIS — G35 MS (MULTIPLE SCLEROSIS): ICD-10-CM

## 2018-01-22 DIAGNOSIS — G35 MS (MULTIPLE SCLEROSIS): Primary | ICD-10-CM

## 2018-01-22 DIAGNOSIS — R90.89 ABNORMAL FINDING ON MRI OF BRAIN: ICD-10-CM

## 2018-01-22 DIAGNOSIS — Z79.899 ENCOUNTER FOR LONG-TERM (CURRENT) USE OF HIGH-RISK MEDICATION: ICD-10-CM

## 2018-01-22 DIAGNOSIS — Z29.89 PROPHYLACTIC IMMUNOTHERAPY: ICD-10-CM

## 2018-01-22 LAB
ALBUMIN SERPL BCP-MCNC: 3.3 G/DL
ALP SERPL-CCNC: 56 U/L
ALT SERPL W/O P-5'-P-CCNC: 18 U/L
AST SERPL-CCNC: 19 U/L
BASOPHILS # BLD AUTO: 0.01 K/UL
BASOPHILS NFR BLD: 0.3 %
BILIRUB DIRECT SERPL-MCNC: 0.2 MG/DL
BILIRUB SERPL-MCNC: 0.4 MG/DL
DIFFERENTIAL METHOD: ABNORMAL
EOSINOPHIL # BLD AUTO: 0 K/UL
EOSINOPHIL NFR BLD: 0.3 %
ERYTHROCYTE [DISTWIDTH] IN BLOOD BY AUTOMATED COUNT: 12.5 %
HCT VFR BLD AUTO: 38.1 %
HGB BLD-MCNC: 12.7 G/DL
IMM GRANULOCYTES # BLD AUTO: 0 K/UL
IMM GRANULOCYTES NFR BLD AUTO: 0 %
LYMPHOCYTES # BLD AUTO: 0.4 K/UL
LYMPHOCYTES NFR BLD: 11.3 %
MCH RBC QN AUTO: 31.3 PG
MCHC RBC AUTO-ENTMCNC: 33.3 G/DL
MCV RBC AUTO: 94 FL
MONOCYTES # BLD AUTO: 0.6 K/UL
MONOCYTES NFR BLD: 15.4 %
NEUTROPHILS # BLD AUTO: 2.8 K/UL
NEUTROPHILS NFR BLD: 72.7 %
NRBC BLD-RTO: 0 /100 WBC
PLATELET # BLD AUTO: 297 K/UL
PMV BLD AUTO: 10.8 FL
PROT SERPL-MCNC: 6.6 G/DL
RBC # BLD AUTO: 4.06 M/UL
WBC # BLD AUTO: 3.82 K/UL

## 2018-01-22 PROCEDURE — 85025 COMPLETE CBC W/AUTO DIFF WBC: CPT

## 2018-01-22 PROCEDURE — 99999 PR PBB SHADOW E&M-EST. PATIENT-LVL III: CPT | Mod: PBBFAC,,, | Performed by: PSYCHIATRY & NEUROLOGY

## 2018-01-22 PROCEDURE — 99215 OFFICE O/P EST HI 40 MIN: CPT | Mod: S$GLB,,, | Performed by: PSYCHIATRY & NEUROLOGY

## 2018-01-22 PROCEDURE — 36415 COLL VENOUS BLD VENIPUNCTURE: CPT

## 2018-01-22 PROCEDURE — 80076 HEPATIC FUNCTION PANEL: CPT

## 2018-01-22 RX ORDER — MINERAL OIL
180 ENEMA (ML) RECTAL DAILY
COMMUNITY

## 2018-01-22 RX ORDER — FLUTICASONE PROPIONATE 50 MCG
1 SPRAY, SUSPENSION (ML) NASAL DAILY
COMMUNITY

## 2018-01-22 NOTE — PROGRESS NOTES
"Subjective:       Patient ID: Camila Dawn is a 37 y.o. female who presents today for a routine clinic visit for MS.  She is accompanied by her     MS HPI:  · DMT: Gilenya 5 days / week  · Side effects from DMT? No  · Taking vitamin D3 as recommended? Yes -  Dose:   · Overall, pt is doing pretty well;   · Pt has had two episodes of diarrhea during the night in past two months; pt does get diarrhea during her period (longstanding), and is also on Xyrem for narcolepsy;  States she is able to control her anal sphincter at all other times (including during diarrhea).  No changes to gait; no changes to urinary control  · Sees dermatologist every year--[facility] in [facility]    SOCIAL HISTORY  Social History   Substance Use Topics    Smoking status: Never Smoker    Smokeless tobacco: Not on file    Alcohol use Yes      Comment: rarely     Living arrangements - the patient lives with their spouse.    MS ROS:  · Fatigue: Yes - on Xyrem; now on Provigil;   · Sleep Disturbance: Yes - narcolepsy; managed in [facility], Dr. Danielle  · Bladder Dysfunction: No  · Bowel Dysfunction: Yes - as in HPI  · Spasticity: No   · Visual Symptoms: No--saw Dr. Preston last year  · Cognitive: Yes - "good days and bad days"; wonders if stress is playing a role;   · Mood Disorder: Yes - no depression but some anxiety;   · Gait Disturbance: No  · Falls: No  · Hand Dysfunction: No  · Pain: No  · Sexual Dysfunction: Not Assessed  · Skin Breakdown: No  · Tremors: No  · Dysphagia:  No  · Dysarthria:  No  · Heat sensitivity:  Yes -   · Any un-met adaptive needs? No  · Copay Assist?  Yes -       Objective:       25 foot timed walk:  3.33 seconds without assist;   Timed 25 Foot Walk: 11/14/2016 7/18/2017   Did patient wear an AFO? No No   Was assistive device used? No No   Time for 25 Foot Walk (seconds) 3.4 3.6   Time for 25 Foot Walk (seconds) 3.2 -     Neurologic Exam    MENTAL STATUS: grossly intact  CRANIAL NERVE EXAM: " There is no internuclear ophthalmoplegia. Extraocular   muscles are intact.  No facial   asymmetry.There is no dysarthria.   MOTOR EXAM: Normal bulk and tone throughout UE and LE bilaterally. Rapid sequential movements are normal. Strength is 5/5 in all groups   in the lower extremities and upper extremities.   REFLEXES: Symmetric and 1+ throughout in all 4 extremities.   SENSORY EXAM: Normal to vibration t/o  COORDINATION: Normal finger-to-nose exam.   GAIT: Narrow based and stable. Pt can hop on each foot 10 times;     Imaging:     Results for orders placed during the hospital encounter of 06/14/17   MRI Brain W WO Contrast    Impression Scattered foci of T2/flair signal on amount in the supratentorial white matter, oriented perpendicular to the ventricular system, without evidence for postcontrast enhancement.  In a patient with a history of multiple sclerosis, findings are suggestive for demyelinating plaque.  No enhancement is seen to suggest active demyelination and no new lesion is detected.  Overall, the plaque burden is unchanged as compared to the previous study of 2016.      Electronically signed by: Laura Johansen MD  Date:     06/14/17  Time:    11:18        Labs:     Lab Results   Component Value Date    RGFFWTIR94QZ 95 09/19/2017    [id number] 57 11/14/2016     Lab Results   Component Value Date    JCVINDEX 0.17 03/14/2017    JCVANTIBODY Negative 03/14/2017     No results found for: RZ4DUMIP, ABSOLUTECD3, IT3APQRU, ABSOLUTECD8, GB8MGROS, ABSOLUTECD4, LABCD48  Lab Results   Component Value Date    WBC 4.29 09/19/2017    RBC 4.21 09/19/2017    HGB 13.1 09/19/2017    HCT 39.8 09/19/2017    MCV 95 09/19/2017    MCH 31.1 (H) 09/19/2017    MCHC 32.9 09/19/2017    RDW 12.6 09/19/2017     09/19/2017    MPV 10.6 09/19/2017    GRAN 3.0 09/19/2017    GRAN 69.7 09/19/2017    LYMPH 0.6 (L) 09/19/2017    LYMPH 13.3 (L) 09/19/2017    MONO 0.7 09/19/2017    MONO 15.6 (H) 09/19/2017    EOS 0.1 09/19/2017     BASO 0.01 09/19/2017    EOSINOPHIL 1.2 09/19/2017    BASOPHIL 0.2 09/19/2017     Sodium   Date Value Ref Range Status   09/19/2017 139 136 - 145 mmol/L Final     Potassium   Date Value Ref Range Status   09/19/2017 4.4 3.5 - 5.1 mmol/L Final     Chloride   Date Value Ref Range Status   09/19/2017 104 95 - 110 mmol/L Final     CO2   Date Value Ref Range Status   09/19/2017 26 23 - 29 mmol/L Final     Glucose   Date Value Ref Range Status   09/19/2017 85 70 - 110 mg/dL Final     BUN, Bld   Date Value Ref Range Status   09/19/2017 12 6 - 20 mg/dL Final     Creatinine   Date Value Ref Range Status   09/19/2017 0.7 0.5 - 1.4 mg/dL Final     Calcium   Date Value Ref Range Status   09/19/2017 9.6 8.7 - 10.5 mg/dL Final     Total Protein   Date Value Ref Range Status   09/19/2017 6.6 6.0 - 8.4 g/dL Final     Albumin   Date Value Ref Range Status   09/19/2017 3.2 (L) 3.5 - 5.2 g/dL Final     Total Bilirubin   Date Value Ref Range Status   09/19/2017 0.4 0.1 - 1.0 mg/dL Final     Comment:     For infants and newborns, interpretation of results should be based  on gestational age, weight and in agreement with clinical  observations.  Premature Infant recommended reference ranges:  Up to 24 hours.............<8.0 mg/dL  Up to 48 hours............<12.0 mg/dL  3-5 days..................<15.0 mg/dL  6-29 days.................<15.0 mg/dL       Alkaline Phosphatase   Date Value Ref Range Status   09/19/2017 49 (L) 55 - 135 U/L Final     AST   Date Value Ref Range Status   09/19/2017 22 10 - 40 U/L Final     ALT   Date Value Ref Range Status   09/19/2017 20 10 - 44 U/L Final     Anion Gap   Date Value Ref Range Status   09/19/2017 9 8 - 16 mmol/L Final     eGFR if    Date Value Ref Range Status   09/19/2017 >60 >60 mL/min/1.73 m^2 Final     eGFR if non    Date Value Ref Range Status   09/19/2017 >60 >60 mL/min/1.73 m^2 Final     Comment:     Calculation used to obtain the estimated glomerular  filtration  rate (eGFR) is the CKD-EPI equation. Since race is unknown   in our information system, the eGFR values for   -American and Non--American patients are given   for each creatinine result.         Diagnosis/Assessment/Plan:    1. Multiple Sclerosis  · Assessment: Pt is clinically and radiographically stable on Gilenya 5x/week;   · Imaging: MRI brain planned Sept 2018  · Disease Modifying Therapies: continue Gilenya; The patient was counseled about the risks associated with immune suppressive therapy, including a higher risk of serious infections and malignancy, as well as the importance of avoiding all live virus vaccines while on immune suppressive medication. We also discussed change to Gilenya label, including risk of melanoma, PML and cryptococcal meningitis.  She will continue to seee derm and ophtho annually;     2. MS Symptom Assessment / Management  · Bowel Dysfunction:  I suspect her hs incontinence episodes (x2) in setting of diarrhea are result of deep sleep associated with narcolopsy medication. She is on the maximal dose of narcolepsy medication. She will speak to her sleep med doctor to explore where a lower dose might be an option.  She might also take an OTC anti-diarrheal on nights when she has had diarrhea during the day, especially if it is shawnee-menstrual period.   · No other changes to regimen described in ROS above    Over 50% of this 40 minute visit was spent in direct face to face counseling of the patient about MS, DMT considerations, and MS symptom management.     Follow up in 6 months with Nicole Barba PA-C        MS (multiple sclerosis)  -     CBC auto differential; Future; Expected date: 01/22/2018  -     Hepatic function panel; Future

## 2018-02-08 ENCOUNTER — TELEPHONE (OUTPATIENT)
Dept: NEUROLOGY | Facility: CLINIC | Age: 38
End: 2018-02-08

## 2018-02-08 NOTE — TELEPHONE ENCOUNTER
----- Message from Franklyn Patel MD sent at 1/22/2018  2:19 PM CST -----  No concern for her.  If he becomes ill or rash after vaccine she needs to stay away or come see someone  ----- Message -----  From: Jazmine Lundy MD  Sent: 1/22/2018  11:22 AM  To: Franklyn Patel MD    Quick question    I have a MS patient who is immunosuppressed on fingolimod.  Her  is planning international travel and needs yellow fever vaccine (which is live) .  No concern for her, right?  Thanks

## 2018-04-01 ENCOUNTER — PATIENT MESSAGE (OUTPATIENT)
Dept: NEUROLOGY | Facility: CLINIC | Age: 38
End: 2018-04-01

## 2018-04-04 DIAGNOSIS — G35 MULTIPLE SCLEROSIS: ICD-10-CM

## 2018-04-04 RX ORDER — FINGOLIMOD HYDROCHLORIDE 0.5 MG/1
CAPSULE ORAL
Qty: 90 CAPSULE | Refills: 0 | Status: SHIPPED | OUTPATIENT
Start: 2018-04-04 | End: 2018-08-31 | Stop reason: SDUPTHER

## 2018-06-07 ENCOUNTER — TELEPHONE (OUTPATIENT)
Dept: NEUROLOGY | Facility: CLINIC | Age: 38
End: 2018-06-07

## 2018-06-07 NOTE — TELEPHONE ENCOUNTER
----- Message from Janet Murillo sent at 6/7/2018  9:30 AM CDT -----  Patient Requesting Appointment.     Reason for appt.: received a letter stating it time to visit the doctor  Communication Preference:phone # [phone]  Additional Information:patient will be out of the [place] beginning 7/11- 8/3/18. Please call.

## 2018-06-26 ENCOUNTER — PATIENT MESSAGE (OUTPATIENT)
Dept: NEUROLOGY | Facility: CLINIC | Age: 38
End: 2018-06-26

## 2018-06-28 ENCOUNTER — PATIENT MESSAGE (OUTPATIENT)
Dept: NEUROLOGY | Facility: CLINIC | Age: 38
End: 2018-06-28

## 2018-07-02 ENCOUNTER — DOCUMENTATION ONLY (OUTPATIENT)
Dept: NEUROLOGY | Facility: CLINIC | Age: 38
End: 2018-07-02

## 2018-07-02 NOTE — PROGRESS NOTES
Faxed enrollment form to [facility]     [phone]-phone  [phone]-fax

## 2018-08-01 ENCOUNTER — PATIENT MESSAGE (OUTPATIENT)
Dept: NEUROLOGY | Facility: CLINIC | Age: 38
End: 2018-08-01

## 2018-08-01 ENCOUNTER — PATIENT MESSAGE (OUTPATIENT)
Dept: OPHTHALMOLOGY | Facility: CLINIC | Age: 38
End: 2018-08-01

## 2018-08-10 ENCOUNTER — OFFICE VISIT (OUTPATIENT)
Dept: NEUROLOGY | Facility: CLINIC | Age: 38
End: 2018-08-10
Payer: COMMERCIAL

## 2018-08-10 ENCOUNTER — TELEPHONE (OUTPATIENT)
Dept: NEUROLOGY | Facility: CLINIC | Age: 38
End: 2018-08-10

## 2018-08-10 ENCOUNTER — LAB VISIT (OUTPATIENT)
Dept: LAB | Facility: HOSPITAL | Age: 38
End: 2018-08-10
Payer: COMMERCIAL

## 2018-08-10 VITALS
DIASTOLIC BLOOD PRESSURE: 82 MMHG | HEIGHT: 66 IN | SYSTOLIC BLOOD PRESSURE: 130 MMHG | WEIGHT: 153.31 LBS | BODY MASS INDEX: 24.64 KG/M2 | HEART RATE: 71 BPM

## 2018-08-10 DIAGNOSIS — E55.9 VITAMIN D INSUFFICIENCY: ICD-10-CM

## 2018-08-10 DIAGNOSIS — Z79.899 ENCOUNTER FOR LONG-TERM (CURRENT) USE OF HIGH-RISK MEDICATION: ICD-10-CM

## 2018-08-10 DIAGNOSIS — E28.2 PCOS (POLYCYSTIC OVARIAN SYNDROME): ICD-10-CM

## 2018-08-10 DIAGNOSIS — G35 MULTIPLE SCLEROSIS: ICD-10-CM

## 2018-08-10 DIAGNOSIS — G35 MULTIPLE SCLEROSIS: Primary | ICD-10-CM

## 2018-08-10 DIAGNOSIS — Z71.89 COUNSELING REGARDING GOALS OF CARE: ICD-10-CM

## 2018-08-10 PROBLEM — G47.419 NARCOLEPSY: Status: ACTIVE | Noted: 2018-08-10

## 2018-08-10 LAB
ALBUMIN SERPL BCP-MCNC: 3.1 G/DL
ALP SERPL-CCNC: 53 U/L
ALT SERPL W/O P-5'-P-CCNC: 12 U/L
ANION GAP SERPL CALC-SCNC: 6 MMOL/L
AST SERPL-CCNC: 17 U/L
BILIRUB SERPL-MCNC: 0.4 MG/DL
BUN SERPL-MCNC: 11 MG/DL
CALCIUM SERPL-MCNC: 9 MG/DL
CHLORIDE SERPL-SCNC: 104 MMOL/L
CO2 SERPL-SCNC: 26 MMOL/L
CREAT SERPL-MCNC: 0.7 MG/DL
EST. GFR  (AFRICAN AMERICAN): >60 ML/MIN/1.73 M^2
EST. GFR  (NON AFRICAN AMERICAN): >60 ML/MIN/1.73 M^2
GLUCOSE SERPL-MCNC: 81 MG/DL
POTASSIUM SERPL-SCNC: 4 MMOL/L
PROT SERPL-MCNC: 6.2 G/DL
SODIUM SERPL-SCNC: 136 MMOL/L

## 2018-08-10 PROCEDURE — 99215 OFFICE O/P EST HI 40 MIN: CPT | Mod: S$GLB,,, | Performed by: PHYSICIAN ASSISTANT

## 2018-08-10 PROCEDURE — 99999 PR PBB SHADOW E&M-EST. PATIENT-LVL IV: CPT | Mod: PBBFAC,,, | Performed by: PHYSICIAN ASSISTANT

## 2018-08-10 PROCEDURE — 80053 COMPREHEN METABOLIC PANEL: CPT

## 2018-08-10 PROCEDURE — 36415 COLL VENOUS BLD VENIPUNCTURE: CPT

## 2018-08-10 PROCEDURE — 3008F BODY MASS INDEX DOCD: CPT | Mod: CPTII,S$GLB,, | Performed by: PHYSICIAN ASSISTANT

## 2018-08-10 NOTE — PROGRESS NOTES
"Subjective:       Patient ID: Camila Dawn is a 38 y.o. female who presents today with her  for a routine clinic visit for MS.  Last visit to the [facility] in January 2018 with Dr. Lundy    MS HPI:  · DMT: Gilenya(5d/wk)  · Side effects from DMT? No  · Taking vitamin D3 as recommended? Yes - 4,000IU/d   · Overall, pt is doing pretty well;   · Pt has had a couple episodes of diarrhea during the night --feels like this is due to Xyrem and feels that if she decreases the dose during her period she will not have issues; this is longstanding issue for her    Xyrem for narcolepsy;  States she is able to control her anal sphincter at all other times (including during diarrhea).   · Sees dermatologist every year--[facility] in [facility]  · Recent mammogram(6/27/2018) at [facility]-normal results provided today-repeat exam in one year  · Well women visit 5/17/2018 with Dr. Frias-Metformin for PCOS  · Working on lowering her cholesterol through diet and exercise  · Dr. Sterling PCP and Dr. Danielle-ENT/sleep notes-patient requests office note be forwarded to these providers    SOCIAL HISTORY  Social History   Substance Use Topics    Smoking status: Never Smoker    Smokeless tobacco: Not on file    Alcohol use Yes      Comment: rarely     Living arrangements - the patient lives with their spouse.      MS ROS:  · Fatigue: Yes - on Xyrem as noted above; now on Provigil;   · Sleep Disturbance: Yes - narcolepsy; managed in [place], Dr. Danielle  · Bladder Dysfunction: No  · Bowel Dysfunction: Yes - as in HPI  · Spasticity: No   · Visual Symptoms: No(history of ON)-has upcoming appt with Dr. Preston in September for Gilenya check  · Cognitive: Yes - "good days and bad days"; wonders if stress is playing a role;   · Mood Disorder: Yes - no depression but some anxiety;   · Gait Disturbance: No  · Falls: No  · Hand Dysfunction: No  · Pain: No  · Sexual Dysfunction: Not Assessed  · Skin " Breakdown: No  · Tremors: No  · Dysphagia:  No  · Dysarthria:  No  · Heat sensitivity:  Yes--increases fatigue   · Any un-met adaptive needs? No  · Copay Assist?  Yes         Objective:        1. 25 foot timed walk: 3.33 seconds without assist last visit; 3.4s today without assistive device  Timed 25 Foot Walk: 2016   Did patient wear an AFO? No No   Was assistive device used? No No   Time for 25 Foot Walk (seconds) 3.4 3.6   Time for 25 Foot Walk (seconds) 3.2 -     3. 9 Hole Peg Test:  9-Hole Peg Test: 2016   Dominant Hand Right   Time (seconds) - DH Trial 1 18   Time (seconds) - DH Trial 2 17   Time (seconds) - NDH Trial 1 20   Time (seconds) - NDH Trial 2 19       Neurologic Exam     Mental Status   Oriented to person, place, and time.   Follows 3 step commands.   Speech: speech is normal   Level of consciousness: alert  Normal comprehension.     Cranial Nerves     CN II   Visual acuity: normal with correction (20/20 OD and OS correctedwith Snellen hand held chart at 6 ft)    CN III, IV, VI   Pupils are equal, round, and reactive to light.  Extraocular motions are normal.     CN V   Facial sensation intact.     CN VII   Facial expression full, symmetric.     CN VIII   Hearing: intact (finger rub)    CN IX, X   Palate: symmetric    CN XI   CN XI normal.     CN XII   Tongue deviation: none    Motor Exam   Muscle bulk: normal  Overall muscle tone: normal    Strength   Right deltoid: 5/5  Left deltoid: 5/5  Right triceps: 5/5  Left triceps: 5/5  Right wrist extension: 5/5  Left wrist extension: 5/5  Right interossei: 5/5  Left interossei: 5/5  Right iliopsoas: 5/5  Left iliopsoas: 5/5  Right hamstrin/5  Left hamstrin/5  Right anterior tibial: 5/5  Left anterior tibial: 5/5  Right peroneal: 5/5  Left peroneal: 5/5    Sensory Exam   Light touch normal.   Right arm vibration: normal  Left arm vibration: normal  Right leg vibration: decreased from toes  Left leg vibration: decreased from  toes    Gait, Coordination, and Reflexes     Gait  Gait: normal    Coordination   Finger to nose coordination: normal  Tandem walking coordination: normal    Tremor   Resting tremor: absent  Action tremor: absent    Reflexes   Right brachioradialis: 2+  Left brachioradialis: 2+  Right biceps: 2+  Left biceps: 2+  Right triceps: 2+  Left triceps: 2+  Right patellar: 2+  Left patellar: 2+  Right achilles: 2+  Left achilles: 2+  Right plantar: normal  Left plantar: normal  Right ankle clonus: absent  Left ankle clonus: absent  Right pendular knee jerk: absent  Left pendular knee jerk: absent  Normal Heel/toe walk  Normal RSM  Patient able to hop on each foot individually           Imaging:     Results for orders placed during the hospital encounter of 06/14/17   MRI Brain W WO Contrast    Impression Scattered foci of T2/flair signal on amount in the supratentorial white matter, oriented perpendicular to the ventricular system, without evidence for postcontrast enhancement.  In a patient with a history of multiple sclerosis, findings are suggestive for demyelinating plaque.  No enhancement is seen to suggest active demyelination and no new lesion is detected.  Overall, the plaque burden is unchanged as compared to the previous study of 2016.      Electronically signed by: Laura Johansen MD  Date:     06/14/17  Time:    11:18      Labs:     Lab Results   Component Value Date    [id number] 95 09/19/2017    [id number] 57 11/14/2016     Lab Results   Component Value Date    JCVINDEX 0.17 03/14/2017    JCVANTIBODY Negative 03/14/2017     No results found for: YT6HCGSD, ABSOLUTECD3, PN3DSHER, ABSOLUTECD8, IM3VHZTQ, ABSOLUTECD4, LABCD48  Lab Results   Component Value Date    WBC 3.82 (L) 01/22/2018    RBC 4.06 01/22/2018    HGB 12.7 01/22/2018    HCT 38.1 01/22/2018    MCV 94 01/22/2018    MCH 31.3 (H) 01/22/2018    MCHC 33.3 01/22/2018    RDW 12.5 01/22/2018     01/22/2018    MPV 10.8 01/22/2018    GRAN 2.8  01/22/2018    GRAN 72.7 01/22/2018    LYMPH 0.4 (L) 01/22/2018    LYMPH 11.3 (L) 01/22/2018    MONO 0.6 01/22/2018    MONO 15.4 (H) 01/22/2018    EOS 0.0 01/22/2018    BASO 0.01 01/22/2018    EOSINOPHIL 0.3 01/22/2018    BASOPHIL 0.3 01/22/2018     Sodium   Date Value Ref Range Status   09/19/2017 139 136 - 145 mmol/L Final     Potassium   Date Value Ref Range Status   09/19/2017 4.4 3.5 - 5.1 mmol/L Final     Chloride   Date Value Ref Range Status   09/19/2017 104 95 - 110 mmol/L Final     CO2   Date Value Ref Range Status   09/19/2017 26 23 - 29 mmol/L Final     Glucose   Date Value Ref Range Status   09/19/2017 85 70 - 110 mg/dL Final     BUN, Bld   Date Value Ref Range Status   09/19/2017 12 6 - 20 mg/dL Final     Creatinine   Date Value Ref Range Status   09/19/2017 0.7 0.5 - 1.4 mg/dL Final     Calcium   Date Value Ref Range Status   09/19/2017 9.6 8.7 - 10.5 mg/dL Final     Total Protein   Date Value Ref Range Status   01/22/2018 6.6 6.0 - 8.4 g/dL Final     Albumin   Date Value Ref Range Status   01/22/2018 3.3 (L) 3.5 - 5.2 g/dL Final     Total Bilirubin   Date Value Ref Range Status   01/22/2018 0.4 0.1 - 1.0 mg/dL Final     Comment:     For infants and newborns, interpretation of results should be based  on gestational age, weight and in agreement with clinical  observations.  Premature Infant recommended reference ranges:  Up to 24 hours.............<8.0 mg/dL  Up to 48 hours............<12.0 mg/dL  3-5 days..................<15.0 mg/dL  6-29 days.................<15.0 mg/dL       Alkaline Phosphatase   Date Value Ref Range Status   01/22/2018 56 55 - 135 U/L Final     AST   Date Value Ref Range Status   01/22/2018 19 10 - 40 U/L Final     ALT   Date Value Ref Range Status   01/22/2018 18 10 - 44 U/L Final     Anion Gap   Date Value Ref Range Status   09/19/2017 9 8 - 16 mmol/L Final     eGFR if    Date Value Ref Range Status   09/19/2017 >60 >60 mL/min/1.73 m^2 Final     eGFR if non     Date Value Ref Range Status   09/19/2017 >60 >60 mL/min/1.73 m^2 Final     Comment:     Calculation used to obtain the estimated glomerular filtration  rate (eGFR) is the CKD-EPI equation. Since race is unknown   in our information system, the eGFR values for   -American and Non--American patients are given   for each creatinine result.         Diagnosis/Assessment/Plan:    1. Multiple Sclerosis  · Assessment: Pt is clinically stable on Gilenya 5x/week and high dose VitD3 supplementation  · Imaging: MRI brain ordered for Sept 2018--could consider MRI without contrast next annual MRI if patient remains stable  · Disease Modifying Therapies: continue Gilenya-will check safety labs today along with Vit D3 and make appropriate recommendations; The patient was counseled about the risks associated with immune suppressive therapy, including a higher risk of serious infections and malignancy, as well as the importance of avoiding all live virus vaccines while on immune suppressive medication. She will continue to see derm and ophtho annually       2. MS Symptom Assessment / Management   No changes made to management of care today    Over 50% of this 40 minute visit was spent in direct face to face counseling of the patient about MS, DMT considerations, and MS symptom management.   Follow-up in about 6 months (around 2/10/2019) for follow up with me.  Patient agreed to POC today.    Attending, Dr. Lundy, was available during today's encounter. Any change to plan along with cosign to appear in the EMR.     Nicole Barba PA-C  [facility]        Multiple sclerosis  -     CBC auto differential; Future; Expected date: 08/10/2018  -     Vitamin D; Future  -     MRI Brain Demyelinating W W/O Contrast; Future; Expected date: 08/10/2018  -     Comprehensive metabolic panel; Future; Expected date: 08/10/2018    Counseling regarding goals of care    Encounter for long-term (current) use of high-risk  medication  -     CBC auto differential; Future; Expected date: 08/10/2018  -     MRI Brain Demyelinating W W/O Contrast; Future; Expected date: 08/10/2018  -     Comprehensive metabolic panel; Future; Expected date: 08/10/2018    Vitamin D insufficiency  -     Vitamin D; Future    PCOS (polycystic ovarian syndrome)

## 2018-08-11 ENCOUNTER — PATIENT MESSAGE (OUTPATIENT)
Dept: NEUROLOGY | Facility: CLINIC | Age: 38
End: 2018-08-11

## 2018-08-13 ENCOUNTER — PATIENT MESSAGE (OUTPATIENT)
Dept: NEUROLOGY | Facility: CLINIC | Age: 38
End: 2018-08-13

## 2018-08-24 ENCOUNTER — LAB VISIT (OUTPATIENT)
Dept: LAB | Facility: HOSPITAL | Age: 38
End: 2018-08-24
Attending: PHYSICIAN ASSISTANT
Payer: COMMERCIAL

## 2018-08-24 DIAGNOSIS — E55.9 VITAMIN D INSUFFICIENCY: ICD-10-CM

## 2018-08-24 DIAGNOSIS — Z79.899 ENCOUNTER FOR LONG-TERM (CURRENT) USE OF HIGH-RISK MEDICATION: ICD-10-CM

## 2018-08-24 DIAGNOSIS — G35 MULTIPLE SCLEROSIS: ICD-10-CM

## 2018-08-24 LAB
25(OH)D3+25(OH)D2 SERPL-MCNC: 85 NG/ML
BASOPHILS # BLD AUTO: 0.01 K/UL
BASOPHILS NFR BLD: 0.3 %
DIFFERENTIAL METHOD: ABNORMAL
EOSINOPHIL # BLD AUTO: 0 K/UL
EOSINOPHIL NFR BLD: 0.5 %
ERYTHROCYTE [DISTWIDTH] IN BLOOD BY AUTOMATED COUNT: 12.9 %
HCT VFR BLD AUTO: 41 %
HGB BLD-MCNC: 13.3 G/DL
LYMPHOCYTES # BLD AUTO: 0.5 K/UL
LYMPHOCYTES NFR BLD: 13 %
MCH RBC QN AUTO: 31.1 PG
MCHC RBC AUTO-ENTMCNC: 32.4 G/DL
MCV RBC AUTO: 96 FL
MONOCYTES # BLD AUTO: 0.6 K/UL
MONOCYTES NFR BLD: 15.3 %
NEUTROPHILS # BLD AUTO: 2.7 K/UL
NEUTROPHILS NFR BLD: 70.9 %
PLATELET # BLD AUTO: 298 K/UL
PMV BLD AUTO: 10.6 FL
RBC # BLD AUTO: 4.28 M/UL
WBC # BLD AUTO: 3.86 K/UL

## 2018-08-24 PROCEDURE — 36415 COLL VENOUS BLD VENIPUNCTURE: CPT

## 2018-08-24 PROCEDURE — 85025 COMPLETE CBC W/AUTO DIFF WBC: CPT

## 2018-08-24 PROCEDURE — 82306 VITAMIN D 25 HYDROXY: CPT

## 2018-08-26 ENCOUNTER — PATIENT MESSAGE (OUTPATIENT)
Dept: NEUROLOGY | Facility: CLINIC | Age: 38
End: 2018-08-26

## 2018-08-31 DIAGNOSIS — G35 MULTIPLE SCLEROSIS: ICD-10-CM

## 2018-08-31 RX ORDER — FINGOLIMOD HYDROCHLORIDE 0.5 MG/1
CAPSULE ORAL
Qty: 30 CAPSULE | Refills: 0 | Status: SHIPPED | OUTPATIENT
Start: 2018-08-31 | End: 2018-09-11 | Stop reason: SDUPTHER

## 2018-09-06 ENCOUNTER — PATIENT MESSAGE (OUTPATIENT)
Dept: NEUROLOGY | Facility: CLINIC | Age: 38
End: 2018-09-06

## 2018-09-06 DIAGNOSIS — G35 MULTIPLE SCLEROSIS: ICD-10-CM

## 2018-09-07 ENCOUNTER — HOSPITAL ENCOUNTER (OUTPATIENT)
Dept: RADIOLOGY | Facility: HOSPITAL | Age: 38
Discharge: HOME | End: 2018-09-07
Attending: PHYSICIAN ASSISTANT
Payer: COMMERCIAL

## 2018-09-07 ENCOUNTER — OFFICE VISIT (OUTPATIENT)
Dept: OPHTHALMOLOGY | Facility: CLINIC | Age: 38
End: 2018-09-07
Payer: COMMERCIAL

## 2018-09-07 ENCOUNTER — CLINICAL SUPPORT (OUTPATIENT)
Dept: OPHTHALMOLOGY | Facility: CLINIC | Age: 38
End: 2018-09-07
Payer: COMMERCIAL

## 2018-09-07 DIAGNOSIS — G35 MULTIPLE SCLEROSIS: ICD-10-CM

## 2018-09-07 DIAGNOSIS — H47.293 PARTIAL OPTIC ATROPHY OF BOTH EYES: ICD-10-CM

## 2018-09-07 DIAGNOSIS — G35 MULTIPLE SCLEROSIS: Primary | ICD-10-CM

## 2018-09-07 DIAGNOSIS — Z79.899 ENCOUNTER FOR LONG-TERM (CURRENT) USE OF HIGH-RISK MEDICATION: ICD-10-CM

## 2018-09-07 DIAGNOSIS — Z79.899 LONG-TERM CURRENT USE OF HIGH RISK MEDICATION OTHER THAN ANTICOAGULANT: ICD-10-CM

## 2018-09-07 PROCEDURE — 70553 MRI BRAIN STEM W/O & W/DYE: CPT | Mod: 26,,, | Performed by: RADIOLOGY

## 2018-09-07 PROCEDURE — 92012 INTRM OPH EXAM EST PATIENT: CPT | Mod: S$GLB,,, | Performed by: OPHTHALMOLOGY

## 2018-09-07 PROCEDURE — A9585 GADOBUTROL INJECTION: HCPCS | Performed by: PHYSICIAN ASSISTANT

## 2018-09-07 PROCEDURE — 25500020 PHARM REV CODE 255: Performed by: PHYSICIAN ASSISTANT

## 2018-09-07 PROCEDURE — 92134 CPTRZ OPH DX IMG PST SGM RTA: CPT | Mod: S$GLB,,, | Performed by: OPHTHALMOLOGY

## 2018-09-07 PROCEDURE — 92083 EXTENDED VISUAL FIELD XM: CPT | Mod: S$GLB,,, | Performed by: OPHTHALMOLOGY

## 2018-09-07 PROCEDURE — 70553 MRI BRAIN STEM W/O & W/DYE: CPT | Mod: TC

## 2018-09-07 PROCEDURE — 99999 PR PBB SHADOW E&M-EST. PATIENT-LVL III: CPT | Mod: PBBFAC,,, | Performed by: OPHTHALMOLOGY

## 2018-09-07 RX ORDER — DROSPIRENONE AND ETHINYL ESTRADIOL 0.03MG-3MG
1 KIT ORAL
COMMUNITY
Start: 2017-06-12 | End: 2019-03-29

## 2018-09-07 RX ORDER — LORATADINE 10 MG/1
TABLET ORAL
COMMUNITY

## 2018-09-07 RX ORDER — GADOBUTROL 604.72 MG/ML
7 INJECTION INTRAVENOUS
Status: COMPLETED | OUTPATIENT
Start: 2018-09-07 | End: 2018-09-07

## 2018-09-07 RX ORDER — ASCORBIC ACID 500 MG
TABLET ORAL
COMMUNITY

## 2018-09-07 RX ORDER — B-COMPLEX WITH VITAMIN C
TABLET ORAL
COMMUNITY
End: 2019-09-17 | Stop reason: SDUPTHER

## 2018-09-07 RX ADMIN — GADOBUTROL 7 ML: 604.72 INJECTION INTRAVENOUS at 08:09

## 2018-09-07 NOTE — PROGRESS NOTES
HPI     Concerns About Ocular Health      Additional comments: Multiple Sclerosis              Comments     DLS:05/09/2017 Mohan  Patient here for annual check taking Gilenya for year. Review HVF AND OCT   done.  Patient states OU vision seem stable this year, but did update rx.  No eye pain.          Last edited by Jennifer Medina MA on 9/7/2018 10:16 AM. (History)            Assessment /Plan     For exam results, see Encounter Report.    Multiple sclerosis  -     Posterior Segment OCT Retina-Both eyes    Partial optic atrophy of both eyes  -     [place] Visual Field - OU - Extended - Both Eyes    Long-term current use of high risk medication other than anticoagulant  -     Posterior Segment OCT Retina-Both eyes      Ms. Dawn's exam and visual function are stable. I will repeat her OCT of the macula in six months.

## 2018-09-10 ENCOUNTER — PATIENT MESSAGE (OUTPATIENT)
Dept: NEUROLOGY | Facility: CLINIC | Age: 38
End: 2018-09-10

## 2018-09-11 RX ORDER — FINGOLIMOD HYDROCHLORIDE 0.5 MG/1
CAPSULE ORAL
Qty: 30 CAPSULE | Refills: 4 | Status: SHIPPED | OUTPATIENT
Start: 2018-09-11 | End: 2019-03-28 | Stop reason: SDUPTHER

## 2018-09-14 ENCOUNTER — PATIENT MESSAGE (OUTPATIENT)
Dept: NEUROLOGY | Facility: CLINIC | Age: 38
End: 2018-09-14

## 2018-09-14 DIAGNOSIS — G35 MULTIPLE SCLEROSIS: Primary | ICD-10-CM

## 2018-10-15 ENCOUNTER — PATIENT MESSAGE (OUTPATIENT)
Dept: NEUROLOGY | Facility: CLINIC | Age: 38
End: 2018-10-15

## 2018-12-10 ENCOUNTER — HOSPITAL ENCOUNTER (OUTPATIENT)
Dept: RADIOLOGY | Facility: HOSPITAL | Age: 38
Discharge: HOME | End: 2018-12-10
Attending: PHYSICIAN ASSISTANT
Payer: COMMERCIAL

## 2018-12-10 DIAGNOSIS — G35 MULTIPLE SCLEROSIS: ICD-10-CM

## 2018-12-10 DIAGNOSIS — I10 HYPERTENSION: Primary | ICD-10-CM

## 2018-12-10 PROCEDURE — 70553 MRI BRAIN STEM W/O & W/DYE: CPT | Mod: TC

## 2018-12-10 PROCEDURE — 70553 MRI BRAIN STEM W/O & W/DYE: CPT | Mod: 26,,, | Performed by: RADIOLOGY

## 2018-12-10 PROCEDURE — 25500020 PHARM REV CODE 255: Performed by: PHYSICIAN ASSISTANT

## 2018-12-10 PROCEDURE — A9585 GADOBUTROL INJECTION: HCPCS | Performed by: PHYSICIAN ASSISTANT

## 2018-12-10 RX ORDER — GADOBUTROL 604.72 MG/ML
6 INJECTION INTRAVENOUS
Status: COMPLETED | OUTPATIENT
Start: 2018-12-10 | End: 2018-12-10

## 2018-12-10 RX ADMIN — GADOBUTROL 6 ML: 604.72 INJECTION INTRAVENOUS at 10:12

## 2018-12-14 ENCOUNTER — DOCUMENTATION ONLY (OUTPATIENT)
Dept: NEUROLOGY | Facility: CLINIC | Age: 38
End: 2018-12-14

## 2018-12-14 ENCOUNTER — PATIENT MESSAGE (OUTPATIENT)
Dept: NEUROLOGY | Facility: CLINIC | Age: 38
End: 2018-12-14

## 2018-12-14 NOTE — PROGRESS NOTES
Fax received from [facility] with recent lab results: CBC, CMP, lipid panel, TSH (collected 12/12/18). Placed in Nicole's folder for review.

## 2018-12-18 NOTE — TELEPHONE ENCOUNTER
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 Her current level is acceptable.

## 2019-01-18 ENCOUNTER — TELEPHONE (OUTPATIENT)
Dept: NEUROLOGY | Facility: CLINIC | Age: 39
End: 2019-01-18

## 2019-01-18 NOTE — TELEPHONE ENCOUNTER
----- Message from Demetra Burks sent at 1/18/2019 10:35 AM CST -----  Contact: self @ [phone]  Pt is scheduled to f/u with Nicole on 2-6-19 and will need to reschedule her appt.  Pls call.

## 2019-01-18 NOTE — TELEPHONE ENCOUNTER
Left VM for patient to give our office a call to reschedule her appointment or reschedule via [facility]

## 2019-02-05 ENCOUNTER — PATIENT MESSAGE (OUTPATIENT)
Dept: NEUROLOGY | Facility: CLINIC | Age: 39
End: 2019-02-05

## 2019-03-14 ENCOUNTER — OFFICE VISIT (OUTPATIENT)
Dept: NEUROLOGY | Facility: CLINIC | Age: 39
End: 2019-03-14
Payer: COMMERCIAL

## 2019-03-14 VITALS
SYSTOLIC BLOOD PRESSURE: 121 MMHG | DIASTOLIC BLOOD PRESSURE: 82 MMHG | HEIGHT: 66 IN | BODY MASS INDEX: 23.03 KG/M2 | WEIGHT: 143.31 LBS | HEART RATE: 71 BPM

## 2019-03-14 DIAGNOSIS — Z79.899 ENCOUNTER FOR LONG-TERM (CURRENT) USE OF HIGH-RISK MEDICATION: ICD-10-CM

## 2019-03-14 DIAGNOSIS — H47.293 PARTIAL OPTIC ATROPHY OF BOTH EYES: ICD-10-CM

## 2019-03-14 DIAGNOSIS — Z71.89 COUNSELING REGARDING GOALS OF CARE: ICD-10-CM

## 2019-03-14 DIAGNOSIS — G35 MULTIPLE SCLEROSIS: Primary | ICD-10-CM

## 2019-03-14 DIAGNOSIS — E28.2 PCOS (POLYCYSTIC OVARIAN SYNDROME): ICD-10-CM

## 2019-03-14 DIAGNOSIS — R41.9 COGNITIVE COMPLAINTS: ICD-10-CM

## 2019-03-14 PROCEDURE — 99999 PR PBB SHADOW E&M-EST. PATIENT-LVL IV: CPT | Mod: PBBFAC,,, | Performed by: PHYSICIAN ASSISTANT

## 2019-03-14 PROCEDURE — 3074F SYST BP LT 130 MM HG: CPT | Mod: CPTII,S$GLB,, | Performed by: PHYSICIAN ASSISTANT

## 2019-03-14 PROCEDURE — 99999 PR PBB SHADOW E&M-EST. PATIENT-LVL IV: ICD-10-PCS | Mod: PBBFAC,,, | Performed by: PHYSICIAN ASSISTANT

## 2019-03-14 PROCEDURE — 3074F PR MOST RECENT SYSTOLIC BLOOD PRESSURE < 130 MM HG: ICD-10-PCS | Mod: CPTII,S$GLB,, | Performed by: PHYSICIAN ASSISTANT

## 2019-03-14 PROCEDURE — 99215 OFFICE O/P EST HI 40 MIN: CPT | Mod: S$GLB,,, | Performed by: PHYSICIAN ASSISTANT

## 2019-03-14 PROCEDURE — 3008F PR BODY MASS INDEX (BMI) DOCUMENTED: ICD-10-PCS | Mod: CPTII,S$GLB,, | Performed by: PHYSICIAN ASSISTANT

## 2019-03-14 PROCEDURE — 3079F DIAST BP 80-89 MM HG: CPT | Mod: CPTII,S$GLB,, | Performed by: PHYSICIAN ASSISTANT

## 2019-03-14 PROCEDURE — 3079F PR MOST RECENT DIASTOLIC BLOOD PRESSURE 80-89 MM HG: ICD-10-PCS | Mod: CPTII,S$GLB,, | Performed by: PHYSICIAN ASSISTANT

## 2019-03-14 PROCEDURE — 3008F BODY MASS INDEX DOCD: CPT | Mod: CPTII,S$GLB,, | Performed by: PHYSICIAN ASSISTANT

## 2019-03-14 PROCEDURE — 99215 PR OFFICE/OUTPT VISIT, EST, LEVL V, 40-54 MIN: ICD-10-PCS | Mod: S$GLB,,, | Performed by: PHYSICIAN ASSISTANT

## 2019-03-14 RX ORDER — ALPHA LIPOIC ACID 100 MG
CAPSULE ORAL
COMMUNITY

## 2019-03-14 NOTE — PROGRESS NOTES
"Subjective:       Patient ID: Camila Dawn is a 38 y.o. female who presents today with her  for a routine clinic visit for MS.      MS HPI:  · DMT: Gilenya(dosing 5d/wk)  · Side effects from DMT? No--occasional headaches but usually feels this is due to dehydration  · Taking vitamin D3 as recommended? Yes - 4,000IU/d   · Sees dermatologist every year--[facility] in [facility]  · Recent mammogram(6/27/2018) at [facility]-normal results  · Well women visit 5/17/2018 with Dr. Frias-Metformin for PCOS  · Working on lowering her cholesterol through diet and exercise--she is making some improvement              Dr. Sterling PCP and Dr. Danielle-ENT/sleep notes-  Patient's father in law passed earlier this year  She has added alpha lipoic acid and cinnamon to her vitamin regime   She has been doing more aqua classes--and aerobic exercises    SOCIAL HISTORY  Social History     Tobacco Use    Smoking status: Never Smoker   Substance Use Topics    Alcohol use: Yes     Comment: rarely    Drug use: No     Living arrangements - the patient lives with their spouse.  Employment     MS ROS:  · Fatigue: Yes - on Xyrem(Narcolepsy) and  Provigil-divided dose 100/100mg;   · Sleep Disturbance: Yes - narcolepsy; managed in [facility], Dr. Danielle  · Bladder Dysfunction: No  · Bowel Dysfunction: Yes - has not had any incontinence--appeared to be relaed to Xyrem-lowers dose when period is approaching and she feels this is beneficial  · Spasticity: No   · Visual Symptoms: No(history of ON)-has upcoming appt with Dr. Preston later this month  · Cognitive: Yes - "good days and bad days"; wonders if stress is playing a role; overall she and  feel cognitive abilities have declined and they are open to cognitive testing  · Mood Disorder: Yes - no depression but some anxiety;   · Gait Disturbance: No  · Falls: No  · Hand Dysfunction: No  · Pain: No  · Sexual Dysfunction: Not Assessed  · Skin Breakdown: " No  · Tremors: No  · Dysphagia:  No  · Dysarthria:  No  · Heat sensitivity:  Yes--increases fatigue   · Any un-met adaptive needs? No  · Copay Assist?  Yes            Objective:        1. 25 foot timed walk:3.6s today without;3.33 seconds without assist last visit; 3.4s previous without assistive device  Timed 25 Foot Walk: 2016   Did patient wear an AFO? No No   Was assistive device used? No No   Time for 25 Foot Walk (seconds) 3.4 3.6   Time for 25 Foot Walk (seconds) 3.2 -     3. 9 Hole Peg Test:   9-Hole Peg Test: 2016   Dominant Hand Right   Time (seconds) - DH Trial 1 18   Time (seconds) - DH Trial 2 17   Time (seconds) - NDH Trial 1 20   Time (seconds) - ND Trial 2 19       Neurologic Exam     Mental Status   Oriented to person, place, and time.   Follows 3 step commands.   Speech: speech is normal   Level of consciousness: alert  Normal comprehension.     Cranial Nerves     CN II   Visual acuity: normal with correction (20/20 OD and OS correctedwith Snellen hand held chart at 6 ft)    CN III, IV, VI   Pupils are equal, round, and reactive to light.  Extraocular motions are normal.     CN V   Facial sensation intact.     CN VII   Facial expression full, symmetric.     CN VIII   Hearing: intact (finger rub)    CN IX, X   Palate: symmetric    CN XI   CN XI normal.     CN XII   Tongue deviation: none    Motor Exam   Muscle bulk: normal  Overall muscle tone: normal    Strength   Right deltoid: 5/5  Left deltoid: 5/5  Right triceps: 5/5  Left triceps: 5/5  Right wrist extension: 5/5  Left wrist extension: 5/5  Right interossei: 5/5  Left interossei: 5/5  Right iliopsoas: 5/5  Left iliopsoas: 5/5  Right hamstrin/5  Left hamstrin/5  Right anterior tibial: 5/5  Left anterior tibial: 5/5  Right peroneal: 5/5  Left peroneal: 5/5R HF 5-/5     Sensory Exam   Light touch normal.   Right arm vibration: normal  Left arm vibration: normal  Right leg vibration: decreased from toes  Left leg  vibration: decreased from toes    Gait, Coordination, and Reflexes     Gait  Gait: normal    Coordination   Finger to nose coordination: normal  Tandem walking coordination: normal    Tremor   Resting tremor: absent  Action tremor: absent    Reflexes   Right brachioradialis: 1+  Left brachioradialis: 1+  Right biceps: 1+  Left biceps: 1+  Right triceps: 1+  Left triceps: 1+  Right patellar: 1+  Left patellar: 1+  Right achilles: 1+  Left achilles: 1+  Right plantar: normal  Left plantar: normal  Right ankle clonus: absent  Left ankle clonus: absent  Right pendular knee jerk: absent  Left pendular knee jerk: absent  Normal Heel/toe walk  Normal RSM  Patient able to hop on each foot individually             Imaging:       Results for orders placed during the hospital encounter of 12/10/18   MRI Brain Demyelinating W W/O Contrast    Impression There has been no significant change from the prior examination of 09/07/2018 with continued scattered foci of T2/FLAIR hyperintensity within the supratentorial demyelinating plaque burden.  Please note there is continued diffusion hyperintensity associated with several of the lesions as well as some T1 hypointense lesions.  No definite new lesion or enhancement to suggest active demyelination.      Electronically signed by: Laura Johansen MD  Date:    12/10/2018  Time:    12:23         Labs:     Lab Results   Component Value Date    WUJZCQUQ14EI 85 08/24/2018    PFZDOHKZ85SN 95 09/19/2017    HGCYCZPM23UM 57 11/14/2016     Lab Results   Component Value Date    JCVINDEX 0.17 03/14/2017    JCVANTIBODY Negative 03/14/2017     No results found for: BM9WCOHF, ABSOLUTECD3, BM8DVBET, ABSOLUTECD8, IY7FUFPR, ABSOLUTECD4, LABCD48  Lab Results   Component Value Date    WBC 3.67 (L) 03/14/2019    RBC 3.92 (L) 03/14/2019    HGB 12.2 03/14/2019    HCT 38.3 03/14/2019    MCV 98 03/14/2019    MCH 31.1 (H) 03/14/2019    MCHC 31.9 (L) 03/14/2019    RDW 12.7 03/14/2019     03/14/2019    MPV  11.0 03/14/2019    GRAN 2.6 03/14/2019    GRAN 70.1 03/14/2019    LYMPH 0.4 (L) 03/14/2019    LYMPH 12.0 (L) 03/14/2019    MONO 0.6 03/14/2019    MONO 16.1 (H) 03/14/2019    EOS 0.0 03/14/2019    BASO 0.02 03/14/2019    EOSINOPHIL 0.8 03/14/2019    BASOPHIL 0.5 03/14/2019     Sodium   Date Value Ref Range Status   03/14/2019 137 136 - 145 mmol/L Final     Potassium   Date Value Ref Range Status   03/14/2019 4.4 3.5 - 5.1 mmol/L Final     Chloride   Date Value Ref Range Status   03/14/2019 104 95 - 110 mmol/L Final     CO2   Date Value Ref Range Status   03/14/2019 27 23 - 29 mmol/L Final     Glucose   Date Value Ref Range Status   03/14/2019 80 70 - 110 mg/dL Final     BUN, Bld   Date Value Ref Range Status   03/14/2019 12 6 - 20 mg/dL Final     Creatinine   Date Value Ref Range Status   03/14/2019 0.7 0.5 - 1.4 mg/dL Final     Calcium   Date Value Ref Range Status   03/14/2019 9.3 8.7 - 10.5 mg/dL Final     Total Protein   Date Value Ref Range Status   03/14/2019 6.3 6.0 - 8.4 g/dL Final     Albumin   Date Value Ref Range Status   03/14/2019 3.4 (L) 3.5 - 5.2 g/dL Final     Total Bilirubin   Date Value Ref Range Status   03/14/2019 0.4 0.1 - 1.0 mg/dL Final     Comment:     For infants and newborns, interpretation of results should be based  on gestational age, weight and in agreement with clinical  observations.  Premature Infant recommended reference ranges:  Up to 24 hours.............<8.0 mg/dL  Up to 48 hours............<12.0 mg/dL  3-5 days..................<15.0 mg/dL  6-29 days.................<15.0 mg/dL       Alkaline Phosphatase   Date Value Ref Range Status   03/14/2019 58 55 - 135 U/L Final     AST   Date Value Ref Range Status   03/14/2019 24 10 - 40 U/L Final     ALT   Date Value Ref Range Status   03/14/2019 20 10 - 44 U/L Final     Anion Gap   Date Value Ref Range Status   03/14/2019 6 (L) 8 - 16 mmol/L Final     eGFR if    Date Value Ref Range Status   03/14/2019 >60.0 >60  mL/min/1.73 m^2 Final     eGFR if non    Date Value Ref Range Status   03/14/2019 >60.0 >60 mL/min/1.73 m^2 Final     Comment:     Calculation used to obtain the estimated glomerular filtration  rate (eGFR) is the CKD-EPI equation.          See Media: outside labs done in December 2018: [id number]          Diagnosis/Assessment/Plan:    1.  Multiple Sclerosis  · Assessment: Pt is clinically stable on Gilenya 5x/week and high dose VitD3 supplementation  · Imaging: MRI brain [id number] with patient and  in clinic. Will perform MRI without contrast next annual in December 2019 if patient remains stable  · Disease Modifying Therapies: continue Gilenya-will check safety labs today and make appropriate recommendations; The patient was counseled about the risks associated with immune suppressive therapy, including a higher risk of serious infections and malignancy, as well as the importance of avoiding all live virus vaccines while on immune suppressive medication. She will continue to see derm and ophtho annually and forward clinic notes to us for review     2.   MS Symptom Assessment / Management   Cognition: referral placed for NP testing-berto Gautam regarding scheduling              No other changes to POC made to management of care today     Over 50% of this 40 minute visit was spent in direct face to face counseling of the patient about MS, DMT considerations, and MS symptom management.   Follow-up in about 6 months (around 9/14/2019) for follow up with Dr. Lundy.  Patient agreed to POC today.    Attending, Dr. Lundy, was available during today's encounter.     Nicole Barba PA-C  [facility]      Problem List Items Addressed This Visit        Neuro    Multiple sclerosis - Primary    Overview     RRMS-stable on Gilenya 5d/wk  Cared for on [facility] by Dr. Lundy and Nicole Barba PA-C         Relevant Orders    CBC auto differential (Completed)    Comprehensive metabolic  panel (Completed)    Ambulatory Referral to Neuropsychology       Ophtho    Partial optic atrophy of both eyes       Endocrine    PCOS (polycystic ovarian syndrome)      Other Visit Diagnoses     Encounter for long-term (current) use of high-risk medication        Relevant Orders    CBC auto differential (Completed)    Comprehensive metabolic panel (Completed)    Cognitive complaints        Relevant Orders    Ambulatory Referral to Neuropsychology    Counseling regarding goals of care

## 2019-03-14 NOTE — Clinical Note
Huber Gautam--order placed today for NP testing(it is not urgent, but please let me know when she's scheduled)Thanks! Nicole

## 2019-03-22 ENCOUNTER — PATIENT MESSAGE (OUTPATIENT)
Dept: NEUROLOGY | Facility: CLINIC | Age: 39
End: 2019-03-22

## 2019-03-22 NOTE — TELEPHONE ENCOUNTER
Pt sent the following message:    Hi again,   Did you receive my blood work from January 2019 from Dr. Sterling's office? They were supposed to fax it to you. Also, when I look for recent tests in this system, it doesn't seem anything has been loaded since my May 2018 appointment. I know you guys are busy; how long does it typically take to show up that these tests and such have been done?     Thanks!

## 2019-03-25 NOTE — TELEPHONE ENCOUNTER
Received CBC, collected on 1/28/19, from Dr. Sterling's office. Placed in Nicole's folder for review.

## 2019-03-28 DIAGNOSIS — G35 MULTIPLE SCLEROSIS: ICD-10-CM

## 2019-03-28 RX ORDER — FINGOLIMOD HYDROCHLORIDE 0.5 MG/1
CAPSULE ORAL
Qty: 22 CAPSULE | Refills: 2 | Status: SHIPPED | OUTPATIENT
Start: 2019-03-28 | End: 2019-04-22 | Stop reason: SDUPTHER

## 2019-03-29 ENCOUNTER — OFFICE VISIT (OUTPATIENT)
Dept: OPHTHALMOLOGY | Facility: CLINIC | Age: 39
End: 2019-03-29
Payer: COMMERCIAL

## 2019-03-29 ENCOUNTER — CLINICAL SUPPORT (OUTPATIENT)
Dept: OPHTHALMOLOGY | Facility: CLINIC | Age: 39
End: 2019-03-29
Payer: COMMERCIAL

## 2019-03-29 DIAGNOSIS — G35 MULTIPLE SCLEROSIS: Primary | ICD-10-CM

## 2019-03-29 DIAGNOSIS — Z79.899 LONG-TERM CURRENT USE OF HIGH RISK MEDICATION OTHER THAN ANTICOAGULANT: ICD-10-CM

## 2019-03-29 PROCEDURE — 99999 PR PBB SHADOW E&M-EST. PATIENT-LVL III: CPT | Mod: PBBFAC,,, | Performed by: OPHTHALMOLOGY

## 2019-03-29 PROCEDURE — 92012 INTRM OPH EXAM EST PATIENT: CPT | Mod: S$GLB,,, | Performed by: OPHTHALMOLOGY

## 2019-03-29 PROCEDURE — 92134 PR COMPUTERIZED OPHTHALMIC IMAGING RETINA: ICD-10-PCS | Mod: S$GLB,,, | Performed by: OPHTHALMOLOGY

## 2019-03-29 PROCEDURE — 99999 PR PBB SHADOW E&M-EST. PATIENT-LVL III: ICD-10-PCS | Mod: PBBFAC,,, | Performed by: OPHTHALMOLOGY

## 2019-03-29 PROCEDURE — 92012 PR EYE EXAM, EST PATIENT,INTERMED: ICD-10-PCS | Mod: S$GLB,,, | Performed by: OPHTHALMOLOGY

## 2019-03-29 PROCEDURE — 92134 CPTRZ OPH DX IMG PST SGM RTA: CPT | Mod: S$GLB,,, | Performed by: OPHTHALMOLOGY

## 2019-03-29 PROCEDURE — 99999 PR PBB SHADOW E&M-EST. PATIENT-LVL I: ICD-10-PCS | Mod: PBBFAC,,,

## 2019-03-29 PROCEDURE — 99999 PR PBB SHADOW E&M-EST. PATIENT-LVL I: CPT | Mod: PBBFAC,,,

## 2019-03-29 NOTE — PROGRESS NOTES
HPI     Concerns About Ocular Health      Additional comments: Multiple Sclerosis              Comments     DLS:09/07/2018 Mohan  Patient here for 6 month check and Review OCT.  Pt states OU vision stable since last seen. Notice a few days OD feeling   of fb sensation.  No eye pain.    I have personally interviewed the patient, reviewed the history and   examined the patient and agree with the technician's exam.            Last edited by Devan Preston MD on 3/29/2019  1:50 PM. (History)            Assessment /Plan     For exam results, see Encounter Report.    Multiple sclerosis  -     Posterior Segment OCT Retina-Both eyes    Long-term current use of high risk medication other than anticoagulant  -     Posterior Segment OCT Retina-Both eyes      I found no evidence of macular edema caused by her Gilenya therapy for MS. I will repeat her exam and OCT in six months.

## 2019-04-22 ENCOUNTER — PATIENT MESSAGE (OUTPATIENT)
Dept: NEUROLOGY | Facility: CLINIC | Age: 39
End: 2019-04-22

## 2019-04-22 DIAGNOSIS — G35 MULTIPLE SCLEROSIS: ICD-10-CM

## 2019-04-23 RX ORDER — FINGOLIMOD HYDROCHLORIDE 0.5 MG/1
CAPSULE ORAL
Qty: 22 CAPSULE | Refills: 0 | Status: SHIPPED | OUTPATIENT
Start: 2019-04-23 | End: 2019-06-10 | Stop reason: SDUPTHER

## 2019-05-06 ENCOUNTER — PATIENT MESSAGE (OUTPATIENT)
Dept: NEUROLOGY | Facility: CLINIC | Age: 39
End: 2019-05-06

## 2019-05-13 ENCOUNTER — PATIENT MESSAGE (OUTPATIENT)
Dept: NEUROLOGY | Facility: CLINIC | Age: 39
End: 2019-05-13

## 2019-05-30 ENCOUNTER — INITIAL CONSULT (OUTPATIENT)
Dept: NEUROLOGY | Facility: CLINIC | Age: 39
End: 2019-05-30
Payer: COMMERCIAL

## 2019-05-30 DIAGNOSIS — F41.1 GENERALIZED ANXIETY DISORDER: ICD-10-CM

## 2019-05-30 DIAGNOSIS — G35 MULTIPLE SCLEROSIS: ICD-10-CM

## 2019-05-30 DIAGNOSIS — R41.9 COGNITIVE COMPLAINTS: ICD-10-CM

## 2019-05-30 PROCEDURE — 99499 NO LOS: ICD-10-PCS | Mod: S$GLB,,, | Performed by: CLINICAL NEUROPSYCHOLOGIST

## 2019-05-30 PROCEDURE — 90791 PR PSYCHIATRIC DIAGNOSTIC EVALUATION: ICD-10-PCS | Mod: GT,S$GLB,, | Performed by: CLINICAL NEUROPSYCHOLOGIST

## 2019-05-30 PROCEDURE — 90791 PSYCH DIAGNOSTIC EVALUATION: CPT | Mod: GT,S$GLB,, | Performed by: CLINICAL NEUROPSYCHOLOGIST

## 2019-05-30 PROCEDURE — 96139 PR PSYCH/NEUROPSYCH TEST ADMIN/SCORING, BY TECH, 2+ TESTS, EA ADDTL 30 MIN: ICD-10-PCS | Mod: S$GLB,,, | Performed by: CLINICAL NEUROPSYCHOLOGIST

## 2019-05-30 PROCEDURE — 96138 PR PSYCH/NEUROPSYCH TEST ADMIN/SCORING, BY TECH, 2+ TESTS, 1ST 30 MIN: ICD-10-PCS | Mod: S$GLB,,, | Performed by: CLINICAL NEUROPSYCHOLOGIST

## 2019-05-30 PROCEDURE — 99499 UNLISTED E&M SERVICE: CPT | Mod: S$GLB,,, | Performed by: CLINICAL NEUROPSYCHOLOGIST

## 2019-05-30 PROCEDURE — 96133 PR NEUROPSYCHOLOGIC TEST EVAL SVCS, EA ADDTL HR: ICD-10-PCS | Mod: S$GLB,,, | Performed by: CLINICAL NEUROPSYCHOLOGIST

## 2019-05-30 PROCEDURE — 96138 PSYCL/NRPSYC TECH 1ST: CPT | Mod: S$GLB,,, | Performed by: CLINICAL NEUROPSYCHOLOGIST

## 2019-05-30 PROCEDURE — 96132 NRPSYC TST EVAL PHYS/QHP 1ST: CPT | Mod: S$GLB,,, | Performed by: CLINICAL NEUROPSYCHOLOGIST

## 2019-05-30 PROCEDURE — 96132 PR NEUROPSYCHOLOGIC TEST EVAL SVCS, 1ST HR: ICD-10-PCS | Mod: S$GLB,,, | Performed by: CLINICAL NEUROPSYCHOLOGIST

## 2019-05-30 PROCEDURE — 96133 NRPSYC TST EVAL PHYS/QHP EA: CPT | Mod: S$GLB,,, | Performed by: CLINICAL NEUROPSYCHOLOGIST

## 2019-05-30 PROCEDURE — 96139 PSYCL/NRPSYC TST TECH EA: CPT | Mod: S$GLB,,, | Performed by: CLINICAL NEUROPSYCHOLOGIST

## 2019-05-30 NOTE — PROGRESS NOTES
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 Discrimination Index 6 -- >16 WNL       LANGUAGE Raw Score Ss/ss/T/z %ile Descriptor   TOPF 53 110 75 Above Average   NAB Naming 29 37T 10 Below Average   COWA         FAS 38 42T 21 Below Average     Animals 23 48T 42 Average   VISUOPERCEPTUAL, VISUO-SPATIAL,VISUOCONSTRUCTION Raw Score Ss/ss/T/z %ile Descriptor   Prasanna Complex Figure           Copy 34 -- >16 Cum. % WNL     Time to Copy 142 -- >16 Cum. % WNL     EXECUTIVE FUNCTIONING Raw Ss/ss/T/z %ile Descriptor   Trail Making Test         Part B 58 (44 errors) 44T 27 Average   WCST-64         Total Correct 58 -- -- --     Total Errors 6 60T 84 Above Average     Perseverative Responses 4 47T 39 Average     Perseverative Errors 4 48T 42 Average     Nonperseverative Errors 2 65T 93 High     Conceptual Level Responses 58 57T 75 Above Average     Categories Completed 5 -- >16 WNL     FMS 0 -- -- WNL     Learning to Learn  0.35 -- >16 WNL     MOOD Raw Ss/ss/T/z %ile Descriptor   Betancur Depression Inventory-II 10 -- -- Minimal   STAI-AD Short Form    State 16 -- 42 WNL     Trait 20 -- 73 Mildly Elevated   ss = scaled score (mean = 10, SD = 3); SS = standard score (mean = 100, SD = 15); T score mean = 50, SD = 10; z-score (mean = 0.00, SD = 1); WNL = within normal limits; BNL = below normal  limits.

## 2019-06-10 DIAGNOSIS — G35 MULTIPLE SCLEROSIS: ICD-10-CM

## 2019-06-10 RX ORDER — FINGOLIMOD HYDROCHLORIDE 0.5 MG/1
CAPSULE ORAL
Qty: 22 CAPSULE | Refills: 2 | Status: SHIPPED | OUTPATIENT
Start: 2019-06-10 | End: 2019-09-08 | Stop reason: SDUPTHER

## 2019-06-13 ENCOUNTER — OFFICE VISIT (OUTPATIENT)
Dept: NEUROLOGY | Facility: CLINIC | Age: 39
End: 2019-06-13
Payer: COMMERCIAL

## 2019-06-13 DIAGNOSIS — G35 MULTIPLE SCLEROSIS: ICD-10-CM

## 2019-06-13 DIAGNOSIS — F41.1 GENERALIZED ANXIETY DISORDER: ICD-10-CM

## 2019-06-13 PROCEDURE — 99499 UNLISTED E&M SERVICE: CPT | Mod: S$GLB,,, | Performed by: CLINICAL NEUROPSYCHOLOGIST

## 2019-06-13 PROCEDURE — 99499 NO LOS: ICD-10-PCS | Mod: S$GLB,,, | Performed by: CLINICAL NEUROPSYCHOLOGIST

## 2019-06-13 NOTE — PROGRESS NOTES
NEUROPSYCHOLOGICAL EVALUATION FEEDBACK    Camila Dawn attended a feedback session today and was accompanied by her .  We discussed the results of the neuropsychological evaluation (55 minutes).  I provided Ms. Dawn with a copy of the evaluation report as well as some handouts and gave time to discuss questions and concerns.    Erin Stratton, PhD  Licensed Clinical Neuropsychologist  [facility] - Department of Neurology

## 2019-07-16 ENCOUNTER — PATIENT MESSAGE (OUTPATIENT)
Dept: NEUROLOGY | Facility: CLINIC | Age: 39
End: 2019-07-16

## 2019-07-25 ENCOUNTER — PATIENT MESSAGE (OUTPATIENT)
Dept: OPHTHALMOLOGY | Facility: CLINIC | Age: 39
End: 2019-07-25

## 2019-08-23 ENCOUNTER — PATIENT MESSAGE (OUTPATIENT)
Dept: NEUROLOGY | Facility: CLINIC | Age: 39
End: 2019-08-23

## 2019-09-08 DIAGNOSIS — G35 MULTIPLE SCLEROSIS: ICD-10-CM

## 2019-09-10 RX ORDER — FINGOLIMOD HYDROCHLORIDE 0.5 MG/1
CAPSULE ORAL
Qty: 22 CAPSULE | Refills: 0 | Status: SHIPPED | OUTPATIENT
Start: 2019-09-10 | End: 2019-10-02 | Stop reason: SDUPTHER

## 2019-09-11 ENCOUNTER — PATIENT MESSAGE (OUTPATIENT)
Dept: NEUROLOGY | Facility: CLINIC | Age: 39
End: 2019-09-11

## 2019-09-16 ENCOUNTER — PATIENT MESSAGE (OUTPATIENT)
Dept: NEUROLOGY | Facility: CLINIC | Age: 39
End: 2019-09-16

## 2019-09-17 ENCOUNTER — CLINICAL SUPPORT (OUTPATIENT)
Dept: OPHTHALMOLOGY | Facility: CLINIC | Age: 39
End: 2019-09-17
Payer: COMMERCIAL

## 2019-09-17 ENCOUNTER — OFFICE VISIT (OUTPATIENT)
Dept: NEUROLOGY | Facility: CLINIC | Age: 39
End: 2019-09-17
Payer: COMMERCIAL

## 2019-09-17 ENCOUNTER — OFFICE VISIT (OUTPATIENT)
Dept: OPHTHALMOLOGY | Facility: CLINIC | Age: 39
End: 2019-09-17
Payer: COMMERCIAL

## 2019-09-17 ENCOUNTER — LAB VISIT (OUTPATIENT)
Dept: LAB | Facility: HOSPITAL | Age: 39
End: 2019-09-17
Attending: PSYCHIATRY & NEUROLOGY
Payer: COMMERCIAL

## 2019-09-17 VITALS
DIASTOLIC BLOOD PRESSURE: 79 MMHG | SYSTOLIC BLOOD PRESSURE: 123 MMHG | BODY MASS INDEX: 24.36 KG/M2 | WEIGHT: 151.56 LBS | HEIGHT: 66 IN | HEART RATE: 71 BPM

## 2019-09-17 DIAGNOSIS — E55.9 VITAMIN D DEFICIENCY: ICD-10-CM

## 2019-09-17 DIAGNOSIS — H47.293 PARTIAL OPTIC ATROPHY OF BOTH EYES: Primary | ICD-10-CM

## 2019-09-17 DIAGNOSIS — Z79.899 LONG-TERM CURRENT USE OF HIGH RISK MEDICATION OTHER THAN ANTICOAGULANT: ICD-10-CM

## 2019-09-17 DIAGNOSIS — G35 MS (MULTIPLE SCLEROSIS): Primary | ICD-10-CM

## 2019-09-17 DIAGNOSIS — G35 MS (MULTIPLE SCLEROSIS): ICD-10-CM

## 2019-09-17 DIAGNOSIS — G35 MULTIPLE SCLEROSIS: ICD-10-CM

## 2019-09-17 LAB
25(OH)D3+25(OH)D2 SERPL-MCNC: 78 NG/ML (ref 30–96)
ALBUMIN SERPL BCP-MCNC: 3.4 G/DL (ref 3.5–5.2)
ALP SERPL-CCNC: 61 U/L (ref 55–135)
ALT SERPL W/O P-5'-P-CCNC: 14 U/L (ref 10–44)
AST SERPL-CCNC: 18 U/L (ref 10–40)
BASOPHILS # BLD AUTO: 0.02 K/UL (ref 0–0.2)
BASOPHILS NFR BLD: 0.6 % (ref 0–1.9)
BILIRUB DIRECT SERPL-MCNC: 0.2 MG/DL (ref 0.1–0.3)
BILIRUB SERPL-MCNC: 0.3 MG/DL (ref 0.1–1)
DIFFERENTIAL METHOD: ABNORMAL
EOSINOPHIL # BLD AUTO: 0 K/UL (ref 0–0.5)
EOSINOPHIL NFR BLD: 0.9 % (ref 0–8)
ERYTHROCYTE [DISTWIDTH] IN BLOOD BY AUTOMATED COUNT: 12.6 % (ref 11.5–14.5)
HCT VFR BLD AUTO: 41 % (ref 37–48.5)
HGB BLD-MCNC: 13 G/DL (ref 12–16)
IMM GRANULOCYTES # BLD AUTO: 0.02 K/UL (ref 0–0.04)
IMM GRANULOCYTES NFR BLD AUTO: 0.6 % (ref 0–0.5)
LYMPHOCYTES # BLD AUTO: 0.5 K/UL (ref 1–4.8)
LYMPHOCYTES NFR BLD: 14.5 % (ref 18–48)
MCH RBC QN AUTO: 31.3 PG (ref 27–31)
MCHC RBC AUTO-ENTMCNC: 31.7 G/DL (ref 32–36)
MCV RBC AUTO: 99 FL (ref 82–98)
MONOCYTES # BLD AUTO: 0.7 K/UL (ref 0.3–1)
MONOCYTES NFR BLD: 22 % (ref 4–15)
NEUTROPHILS # BLD AUTO: 2 K/UL (ref 1.8–7.7)
NEUTROPHILS NFR BLD: 61.4 % (ref 38–73)
NRBC BLD-RTO: 0 /100 WBC
PLATELET # BLD AUTO: 286 K/UL (ref 150–350)
PMV BLD AUTO: 10.4 FL (ref 9.2–12.9)
PROT SERPL-MCNC: 6.5 G/DL (ref 6–8.4)
RBC # BLD AUTO: 4.15 M/UL (ref 4–5.4)
WBC # BLD AUTO: 3.32 K/UL (ref 3.9–12.7)

## 2019-09-17 PROCEDURE — 92012 PR EYE EXAM, EST PATIENT,INTERMED: ICD-10-PCS | Mod: S$GLB,,, | Performed by: OPHTHALMOLOGY

## 2019-09-17 PROCEDURE — 99999 PR PBB SHADOW E&M-EST. PATIENT-LVL III: ICD-10-PCS | Mod: PBBFAC,,, | Performed by: OPHTHALMOLOGY

## 2019-09-17 PROCEDURE — 92134 POSTERIOR SEGMENT OCT RETINA (OCULAR COHERENCE TOMOGRAPHY)-BOTH EYES: ICD-10-PCS | Mod: S$GLB,,, | Performed by: OPHTHALMOLOGY

## 2019-09-17 PROCEDURE — 92134 CPTRZ OPH DX IMG PST SGM RTA: CPT | Mod: S$GLB,,, | Performed by: OPHTHALMOLOGY

## 2019-09-17 PROCEDURE — 99999 PR PBB SHADOW E&M-EST. PATIENT-LVL III: CPT | Mod: PBBFAC,,, | Performed by: PSYCHIATRY & NEUROLOGY

## 2019-09-17 PROCEDURE — 80076 HEPATIC FUNCTION PANEL: CPT

## 2019-09-17 PROCEDURE — 3078F PR MOST RECENT DIASTOLIC BLOOD PRESSURE < 80 MM HG: ICD-10-PCS | Mod: CPTII,S$GLB,, | Performed by: PSYCHIATRY & NEUROLOGY

## 2019-09-17 PROCEDURE — 99999 PR PBB SHADOW E&M-EST. PATIENT-LVL I: ICD-10-PCS | Mod: PBBFAC,,,

## 2019-09-17 PROCEDURE — 3074F PR MOST RECENT SYSTOLIC BLOOD PRESSURE < 130 MM HG: ICD-10-PCS | Mod: CPTII,S$GLB,, | Performed by: PSYCHIATRY & NEUROLOGY

## 2019-09-17 PROCEDURE — 3008F BODY MASS INDEX DOCD: CPT | Mod: CPTII,S$GLB,, | Performed by: PSYCHIATRY & NEUROLOGY

## 2019-09-17 PROCEDURE — 99215 PR OFFICE/OUTPT VISIT, EST, LEVL V, 40-54 MIN: ICD-10-PCS | Mod: S$GLB,,, | Performed by: PSYCHIATRY & NEUROLOGY

## 2019-09-17 PROCEDURE — 3008F PR BODY MASS INDEX (BMI) DOCUMENTED: ICD-10-PCS | Mod: CPTII,S$GLB,, | Performed by: PSYCHIATRY & NEUROLOGY

## 2019-09-17 PROCEDURE — 92083 EXTENDED VISUAL FIELD XM: CPT | Mod: S$GLB,,, | Performed by: OPHTHALMOLOGY

## 2019-09-17 PROCEDURE — 92012 INTRM OPH EXAM EST PATIENT: CPT | Mod: S$GLB,,, | Performed by: OPHTHALMOLOGY

## 2019-09-17 PROCEDURE — 99999 PR PBB SHADOW E&M-EST. PATIENT-LVL I: CPT | Mod: PBBFAC,,,

## 2019-09-17 PROCEDURE — 99999 PR PBB SHADOW E&M-EST. PATIENT-LVL III: ICD-10-PCS | Mod: PBBFAC,,, | Performed by: PSYCHIATRY & NEUROLOGY

## 2019-09-17 PROCEDURE — 92083 HUMPHREY VISUAL FIELD - OU - BOTH EYES: ICD-10-PCS | Mod: S$GLB,,, | Performed by: OPHTHALMOLOGY

## 2019-09-17 PROCEDURE — 36415 COLL VENOUS BLD VENIPUNCTURE: CPT

## 2019-09-17 PROCEDURE — 85025 COMPLETE CBC W/AUTO DIFF WBC: CPT

## 2019-09-17 PROCEDURE — 3074F SYST BP LT 130 MM HG: CPT | Mod: CPTII,S$GLB,, | Performed by: PSYCHIATRY & NEUROLOGY

## 2019-09-17 PROCEDURE — 99999 PR PBB SHADOW E&M-EST. PATIENT-LVL III: CPT | Mod: PBBFAC,,, | Performed by: OPHTHALMOLOGY

## 2019-09-17 PROCEDURE — 82306 VITAMIN D 25 HYDROXY: CPT

## 2019-09-17 PROCEDURE — 3078F DIAST BP <80 MM HG: CPT | Mod: CPTII,S$GLB,, | Performed by: PSYCHIATRY & NEUROLOGY

## 2019-09-17 PROCEDURE — 99215 OFFICE O/P EST HI 40 MIN: CPT | Mod: S$GLB,,, | Performed by: PSYCHIATRY & NEUROLOGY

## 2019-09-17 NOTE — PROGRESS NOTES
HPI     Concerns About Ocular Health      Additional comments: Multiple Sclerosis              Comments     DLS:03/29/2019 Mohan  Patient here for 6 month check up and Review HVF.  Patient states OU vision stable.  No eye pain.    I have personally interviewed the patient, reviewed the history and   examined the patient and agree with the technician's exam.          Last edited by Devan Preston MD on 9/17/2019  3:05 PM. (History)            Assessment /Plan     For exam results, see Encounter Report.    Partial optic atrophy of both eyes  -     Hoover Visual Field - OU - Extended - Both Eyes  -     Posterior Segment OCT Retina-Both eyes    Long-term current use of high risk medication other than anticoagulant  -     Posterior Segment OCT Retina-Both eyes      I found no evidence of macular edema related to Gilenya therapy. Her partial optic atrophy has not changed. I will repeat her exam and OCT macula in six months..

## 2019-09-17 NOTE — PROGRESS NOTES
Subjective:       Patient ID: Camila Dawn is a 39 y.o. female who presents today for a routine clinic visit for MS.  She comes in with her     MS HPI:  · DMT: Gilenya 5 days/week;   · Side effects from DMT? No  · Taking vitamin D3 as recommended? Yes - 4,000 IU/day   · Working out at least 3 days/ week; feels better when she works out;   · Overall she is feeling stable;     SOCIAL HISTORY  Social History     Tobacco Use    Smoking status: Never Smoker    Smokeless tobacco: Never Used   Substance Use Topics    Alcohol use: Yes     Comment: rarely    Drug use: No     Living arrangements - the patient lives with their spouse.  Employment : no    MS REVIEW OF SYMPTOMS 9/16/2019   Do you feel abnormally tired on most days? No -- Provigil    Do you feel you generally sleep well? Yes --    Do you have difficulty controlling your bladder?  No   Do you have difficulty controlling your bowels?  No   Do you have frequent muscle cramps, tightness or spasms in your limbs?  No   Do you have new visual symptoms?  No   Do you have worsening difficulty with your memory or thinking? No   Do you have worsening symptoms of anxiety or depression?  No--recently diagnosed with REED   For patients who walk, Do you have more difficulty walking?  No   Have you fallen since your last visit?  No   For patients who use wheelchairs: Do you have any skin wounds or breakdown? Not Applicable   Do you have difficulty using your hands?  No   Do you have shooting or burning pain? No   Do you have difficulty with sexual function?  No   If you are sexually active, are you using birth control? Y/N  N/A Yes   Do you often choke when swallowing liquids or solid food?  No   Do you experience worsening symptoms when overheated? Yes   Do you need any new equipment such as a wheelchair, walker or shower chair? No   Do you receive co-pay financial assistance for your principal MS medicine? Yes   Would you be interested in participating in an  MS research trial in the future? No   Do you feel you have adequate family/friend support?  Yes   Do you have health insurance?   Yes   Are you currently employed? No   Do you receive SSDI/SSI?  No   Do you use marijuana or cannabis products? Yes  --uses CBD products   How often? Weekly   Have you been diagnosed with a urinary tract infection since your last visit here? No   Have you been diagnosed with a respiratory tract infection since your last visit here? No   Have you been to the emergency room since your last visit here? No   Have you been hospitalized since your last visit here?  No     FSS SCORE & INTERPRETATION 9/16/2019   FSS SCORE  35   FSS SCORE INTERPRETATION May not be suffering from fatigue     MS BRENDA-D SCORE & INTERPRETATION 9/16/2019   BRENDA-D SCORE  9   BRENDA-D INTERPRETATION  No indication of Depression     MS REED-7 SCORE & INTERPRETATION 9/16/2019   REED-7 SCORE  4   REED-7 SCORE INTERPRETATION Normal     PEQ MS MOS PAIN EFFECTS SCORE & INTERPRETATION 9/16/2019   PES SCORE 10   PES SCORE INTERPRETATION Scores can range from 6-30.  Items are scaled so that higher scores indicate a greater impact of pain on a patients mood and behavior.     PEQ MS SEXUAL SATISFACTION SCORE & INTERPRETATION 9/16/2019   SSS SCORE  9   SSS SCORE INTERPRETATION Scores can range from 4-24.  Higher scores indicate greater problems with sexual satisfaction.     MS BLADDER CONTROL SCORE & INTERPRETATION 9/16/2019   BLCS SCORE 2   BLCS SCORE INTERPRETATION  Scores can range from 0-22, with higher scores indicating greater bladder control problems.     MS BOWEL CONTROL SCORE & INTERPRETATION 9/16/2019   BWCS SCORE 2   BWCS SCORE INTERPRETATION Scores can range from 0-26, with higher scores indicating greater bowel control problems.     PEQ MS IMPACT OF VISUAL IMPAIRMENT SCORE & INTERPRETATION 9/16/2019   JASPER SCALE SCORE  0   JASPER SCORE INTERPRETATION Scores can range from 0-15, with higher scores indicating greater impact of  visual problems on daily activites.     MS PDQ SCORE & INTERPRETATION 9/16/2019   PDQ RETROSPECTIVE MEMORY SUBSCALE 7   PDQ ATTENTION/CONCENTRATION SUBSCALE 8   PDQ PROSPECTIVE MEMORY SUBSCALE 5   PDQ PLANNING/ORGANIZATION SUBSCALE 7   PDQ TOTAL SCORE 27   PDQ SCORE INTERPRETATION Scores can range from 0-80, with higher scores indicating greater perceived cognitive impairment.     MSSS SCORE & INTERPRETATION 9/16/2019   MSSS TANGIBLE SUPPORT SUBSCALE 68.75   MSSS EMOTIONAL/INFORMATIONAL SUPPORT SUBSCALE 100   MSSS AFFECTIONATE SUPPORT SUBSCALE 91.67   MSSS POSITIVE SOCIAL INTERACTION SUBSCALE 58.33   MSSS TOTAL SCORE 79.69   MSSS SCORE INTERPRETATION Scores can range from 0-100, with higher scores indicating greater perceived support.         Objective:      25 foot timed walk: 3.4s without assist;  Was 3.6s last visit   Neurologic Exam    MENTAL STATUS: grossly intact  CRANIAL NERVE EXAM: There is no internuclear ophthalmoplegia. Extraocular   muscles are intact.  No facial   asymmetry.There is no dysarthria.   MOTOR EXAM: Normal bulk and tone throughout UE and LE bilaterally. Rapid sequential movements are normal. Strength is 5/5 in all groups   in the lower extremities and upper extremities.   REFLEXES: Symmetric and 1+ throughout in all 4 extremities.   SENSORY EXAM: Normal to vibration t/o  COORDINATION: Normal finger-to-nose exam.   GAIT: Narrow based and stable.    Imaging:     Results for orders placed during the hospital encounter of 12/10/18   MRI Brain Demyelinating W W/O Contrast    Impression There has been no significant change from the prior examination of 09/07/2018 with continued scattered foci of T2/FLAIR hyperintensity within the supratentorial demyelinating plaque burden.  Please note there is continued diffusion hyperintensity associated with several of the lesions as well as some T1 hypointense lesions.  No definite new lesion or enhancement to suggest active demyelination.      Electronically  signed by: Laura Johansen MD  Date:    12/10/2018  Time:    12:23     Labs:     Lab Results   Component Value Date    NWIHUMDZ79GD 78 09/17/2019    ZYMISMIX42FE 85 08/24/2018    ZXHZKLYW85JV 95 09/19/2017     Lab Results   Component Value Date    JCVINDEX 0.17 03/14/2017    JCVANTIBODY Negative 03/14/2017     No results found for: KV4TCIBT, ABSOLUTECD3, QG7KZPYI, ABSOLUTECD8, LS2TXHWF, ABSOLUTECD4, LABCD48  Lab Results   Component Value Date    WBC 3.32 (L) 09/17/2019    RBC 4.15 09/17/2019    HGB 13.0 09/17/2019    HCT 41.0 09/17/2019    MCV 99 (H) 09/17/2019    MCH 31.3 (H) 09/17/2019    MCHC 31.7 (L) 09/17/2019    RDW 12.6 09/17/2019     09/17/2019    MPV 10.4 09/17/2019    GRAN 2.0 09/17/2019    GRAN 61.4 09/17/2019    LYMPH 0.5 (L) 09/17/2019    LYMPH 14.5 (L) 09/17/2019    MONO 0.7 09/17/2019    MONO 22.0 (H) 09/17/2019    EOS 0.0 09/17/2019    BASO 0.02 09/17/2019    EOSINOPHIL 0.9 09/17/2019    BASOPHIL 0.6 09/17/2019     Sodium   Date Value Ref Range Status   03/14/2019 137 136 - 145 mmol/L Final     Potassium   Date Value Ref Range Status   03/14/2019 4.4 3.5 - 5.1 mmol/L Final     Chloride   Date Value Ref Range Status   03/14/2019 104 95 - 110 mmol/L Final     CO2   Date Value Ref Range Status   03/14/2019 27 23 - 29 mmol/L Final     Glucose   Date Value Ref Range Status   03/14/2019 80 70 - 110 mg/dL Final     BUN, Bld   Date Value Ref Range Status   03/14/2019 12 6 - 20 mg/dL Final     Creatinine   Date Value Ref Range Status   03/14/2019 0.7 0.5 - 1.4 mg/dL Final     Calcium   Date Value Ref Range Status   03/14/2019 9.3 8.7 - 10.5 mg/dL Final     Total Protein   Date Value Ref Range Status   09/17/2019 6.5 6.0 - 8.4 g/dL Final     Albumin   Date Value Ref Range Status   09/17/2019 3.4 (L) 3.5 - 5.2 g/dL Final     Total Bilirubin   Date Value Ref Range Status   09/17/2019 0.3 0.1 - 1.0 mg/dL Final     Comment:     For infants and newborns, interpretation of results should be based  on  gestational age, weight and in agreement with clinical  observations.  Premature Infant recommended reference ranges:  Up to 24 hours.............<8.0 mg/dL  Up to 48 hours............<12.0 mg/dL  3-5 days..................<15.0 mg/dL  6-29 days.................<15.0 mg/dL       Alkaline Phosphatase   Date Value Ref Range Status   09/17/2019 61 55 - 135 U/L Final     AST   Date Value Ref Range Status   09/17/2019 18 10 - 40 U/L Final     ALT   Date Value Ref Range Status   09/17/2019 14 10 - 44 U/L Final     Anion Gap   Date Value Ref Range Status   03/14/2019 6 (L) 8 - 16 mmol/L Final     eGFR if    Date Value Ref Range Status   03/14/2019 >60.0 >60 mL/min/1.73 m^2 Final     eGFR if non    Date Value Ref Range Status   03/14/2019 >60.0 >60 mL/min/1.73 m^2 Final     Comment:     Calculation used to obtain the estimated glomerular filtration  rate (eGFR) is the CKD-EPI equation.          Diagnosis/Assessment/Plan:    1. Multiple Sclerosis  · Assessment: Pt clinically stable  · Imaging: Annual MRI brain planned December 2019  · Disease Modifying Therapies: continue Gilenya and vitamin D3; The patient was counseled about the risks associated with immune suppressive therapy, including a higher risk of serious infections and malignancy, as well as the importance of avoiding all live virus vaccines while on immune suppressive medication.  Labs today    2. MS Symptom Assessment / Management: No other changes to regimen described in ROS above    F/u with Nicole Barba CNS in 6mo       Our visit today lasted 40 minutes, and 100% of this time was spent face to face with the patient. Over 50% of this visit included discussion of the treatment plan/medication changes/symptom management/exam findings/imaging results/coordination of care. The patient agrees with the plan of care.         Problem List Items Addressed This Visit        1 - High    Multiple sclerosis    Overview     RRMS-stable on  Gilenya 5d/wk  [facility] by Dr. Lundy and Nicole Barba PA-C         Current Assessment & Plan     Continue Gilenya 5 days/week; continue vitamin D           Other Visit Diagnoses     MS (multiple sclerosis)    -  Primary    Relevant Orders    MRI Brain Demyelinating Without Contrast    CBC auto differential (Completed)    Hepatic function panel (Completed)    STRATIFY JCV ANTIBODY (with Index)    Vitamin D (Completed)    Vitamin D deficiency        Relevant Orders    CBC auto differential (Completed)    Hepatic function panel (Completed)    STRATIFY JCV ANTIBODY (with Index)    Vitamin D (Completed)

## 2019-09-19 ENCOUNTER — PATIENT MESSAGE (OUTPATIENT)
Dept: PSYCHIATRY | Facility: CLINIC | Age: 39
End: 2019-09-19

## 2019-09-26 LAB
JCPYV AB SERPL QL IA: ABNORMAL
JCV AB BY INHIBITION: NEGATIVE
JCV INDEX: 0.2

## 2019-09-29 ENCOUNTER — PATIENT MESSAGE (OUTPATIENT)
Dept: NEUROLOGY | Facility: CLINIC | Age: 39
End: 2019-09-29

## 2019-10-02 DIAGNOSIS — G35 MULTIPLE SCLEROSIS: ICD-10-CM

## 2019-10-03 ENCOUNTER — PATIENT MESSAGE (OUTPATIENT)
Dept: NEUROLOGY | Facility: CLINIC | Age: 39
End: 2019-10-03

## 2019-10-03 RX ORDER — FINGOLIMOD HYDROCHLORIDE 0.5 MG/1
CAPSULE ORAL
Qty: 22 CAPSULE | Refills: 2 | Status: SHIPPED | OUTPATIENT
Start: 2019-10-03 | End: 2019-11-12 | Stop reason: SDUPTHER

## 2019-11-12 ENCOUNTER — PATIENT MESSAGE (OUTPATIENT)
Dept: NEUROLOGY | Facility: CLINIC | Age: 39
End: 2019-11-12

## 2019-11-12 DIAGNOSIS — G35 MULTIPLE SCLEROSIS: ICD-10-CM

## 2019-11-14 RX ORDER — FINGOLIMOD HYDROCHLORIDE 0.5 MG/1
CAPSULE ORAL
Qty: 22 CAPSULE | Refills: 2 | Status: SHIPPED | OUTPATIENT
Start: 2019-11-14 | End: 2020-03-17 | Stop reason: SDUPTHER

## 2019-11-26 ENCOUNTER — PATIENT MESSAGE (OUTPATIENT)
Dept: NEUROLOGY | Facility: CLINIC | Age: 39
End: 2019-11-26

## 2019-12-16 ENCOUNTER — HOSPITAL ENCOUNTER (OUTPATIENT)
Dept: RADIOLOGY | Facility: HOSPITAL | Age: 39
Discharge: HOME | End: 2019-12-16
Attending: PSYCHIATRY & NEUROLOGY
Payer: COMMERCIAL

## 2019-12-16 DIAGNOSIS — G35 MS (MULTIPLE SCLEROSIS): ICD-10-CM

## 2019-12-16 PROCEDURE — 70551 MRI BRAIN STEM W/O DYE: CPT | Mod: TC

## 2019-12-16 PROCEDURE — 70551 MRI BRAIN STEM W/O DYE: CPT | Mod: 26,,, | Performed by: RADIOLOGY

## 2019-12-16 PROCEDURE — 70551 MRI BRAIN DEMYELINATING WITHOUT CONTRAST: ICD-10-PCS | Mod: 26,,, | Performed by: RADIOLOGY

## 2019-12-30 ENCOUNTER — TELEPHONE (OUTPATIENT)
Dept: NEUROLOGY | Facility: CLINIC | Age: 39
End: 2019-12-30

## 2019-12-30 NOTE — TELEPHONE ENCOUNTER
Returned call. Pt states Saturday night she started experiencing a tightness around her left lower rib cage that would come and go. Using a heating pad and CBD oil helped relieve symptoms. Today the symptoms have resolved. Pt denies any recent infections. Pt will call back with symptoms return.

## 2019-12-30 NOTE — TELEPHONE ENCOUNTER
"----- Message from Camille Mi MA sent at 12/30/2019  8:13 AM CST -----  Contact: [phone]  Pt states that she feels like she is experiencing the "MS travis"     She said that she  has unexplained pain at the right rib.       [phone]  "

## 2020-02-14 DIAGNOSIS — G35 MULTIPLE SCLEROSIS: ICD-10-CM

## 2020-02-24 RX ORDER — FINGOLIMOD HYDROCHLORIDE 0.5 MG/1
CAPSULE ORAL
Qty: 22 CAPSULE | Refills: 2 | OUTPATIENT
Start: 2020-02-24

## 2020-03-03 ENCOUNTER — PATIENT MESSAGE (OUTPATIENT)
Dept: NEUROLOGY | Facility: CLINIC | Age: 40
End: 2020-03-03

## 2020-03-05 ENCOUNTER — PATIENT MESSAGE (OUTPATIENT)
Dept: NEUROLOGY | Facility: CLINIC | Age: 40
End: 2020-03-05

## 2020-03-10 ENCOUNTER — PATIENT MESSAGE (OUTPATIENT)
Dept: NEUROLOGY | Facility: CLINIC | Age: 40
End: 2020-03-10

## 2020-03-12 ENCOUNTER — PATIENT MESSAGE (OUTPATIENT)
Dept: NEUROLOGY | Facility: CLINIC | Age: 40
End: 2020-03-12

## 2020-03-12 DIAGNOSIS — G35 MULTIPLE SCLEROSIS: Primary | ICD-10-CM

## 2020-03-13 ENCOUNTER — PATIENT MESSAGE (OUTPATIENT)
Dept: NEUROLOGY | Facility: CLINIC | Age: 40
End: 2020-03-13

## 2020-03-13 ENCOUNTER — TELEPHONE (OUTPATIENT)
Dept: NEUROLOGY | Facility: CLINIC | Age: 40
End: 2020-03-13

## 2020-03-13 DIAGNOSIS — G35 MULTIPLE SCLEROSIS: Primary | ICD-10-CM

## 2020-03-13 NOTE — TELEPHONE ENCOUNTER
Returned call and advised pt to follow her general practitioners advice of calling the CDC with her current symptoms. Also provided pt with the [facility] COVID-19 number. Pt v/u.

## 2020-03-15 ENCOUNTER — PATIENT MESSAGE (OUTPATIENT)
Dept: NEUROLOGY | Facility: CLINIC | Age: 40
End: 2020-03-15

## 2020-03-16 ENCOUNTER — LAB VISIT (OUTPATIENT)
Dept: LAB | Facility: HOSPITAL | Age: 40
End: 2020-03-16
Attending: PHYSICIAN ASSISTANT
Payer: COMMERCIAL

## 2020-03-16 ENCOUNTER — PATIENT MESSAGE (OUTPATIENT)
Dept: NEUROLOGY | Facility: CLINIC | Age: 40
End: 2020-03-16

## 2020-03-16 ENCOUNTER — PATIENT MESSAGE (OUTPATIENT)
Dept: OPHTHALMOLOGY | Facility: CLINIC | Age: 40
End: 2020-03-16

## 2020-03-16 DIAGNOSIS — G35 MULTIPLE SCLEROSIS: ICD-10-CM

## 2020-03-16 LAB
ALBUMIN SERPL BCP-MCNC: 3.2 G/DL (ref 3.5–5.2)
ALP SERPL-CCNC: 62 U/L (ref 55–135)
ALT SERPL W/O P-5'-P-CCNC: 21 U/L (ref 10–44)
AST SERPL-CCNC: 21 U/L (ref 10–40)
BASOPHILS # BLD AUTO: 0.02 K/UL (ref 0–0.2)
BASOPHILS NFR BLD: 0.4 % (ref 0–1.9)
BILIRUB DIRECT SERPL-MCNC: 0.2 MG/DL (ref 0.1–0.3)
BILIRUB SERPL-MCNC: 0.1 MG/DL (ref 0.1–1)
DIFFERENTIAL METHOD: ABNORMAL
EOSINOPHIL # BLD AUTO: 0.1 K/UL (ref 0–0.5)
EOSINOPHIL NFR BLD: 0.9 % (ref 0–8)
ERYTHROCYTE [DISTWIDTH] IN BLOOD BY AUTOMATED COUNT: 13 % (ref 11.5–14.5)
HCT VFR BLD AUTO: 39.6 % (ref 37–48.5)
HGB BLD-MCNC: 13 G/DL (ref 12–16)
IMM GRANULOCYTES # BLD AUTO: 0.02 K/UL (ref 0–0.04)
IMM GRANULOCYTES NFR BLD AUTO: 0.4 % (ref 0–0.5)
LYMPHOCYTES # BLD AUTO: 0.5 K/UL (ref 1–4.8)
LYMPHOCYTES NFR BLD: 9 % (ref 18–48)
MCH RBC QN AUTO: 31.3 PG (ref 27–31)
MCHC RBC AUTO-ENTMCNC: 32.8 G/DL (ref 32–36)
MCV RBC AUTO: 95 FL (ref 82–98)
MONOCYTES # BLD AUTO: 0.8 K/UL (ref 0.3–1)
MONOCYTES NFR BLD: 15.5 % (ref 4–15)
NEUTROPHILS # BLD AUTO: 4 K/UL (ref 1.8–7.7)
NEUTROPHILS NFR BLD: 73.8 % (ref 38–73)
NRBC BLD-RTO: 0 /100 WBC
PLATELET # BLD AUTO: 285 K/UL (ref 150–350)
PMV BLD AUTO: 10 FL (ref 9.2–12.9)
PROT SERPL-MCNC: 6.6 G/DL (ref 6–8.4)
RBC # BLD AUTO: 4.16 M/UL (ref 4–5.4)
WBC # BLD AUTO: 5.35 K/UL (ref 3.9–12.7)

## 2020-03-16 PROCEDURE — 85025 COMPLETE CBC W/AUTO DIFF WBC: CPT

## 2020-03-16 PROCEDURE — 80076 HEPATIC FUNCTION PANEL: CPT

## 2020-03-16 PROCEDURE — 36415 COLL VENOUS BLD VENIPUNCTURE: CPT

## 2020-03-17 ENCOUNTER — OFFICE VISIT (OUTPATIENT)
Dept: NEUROLOGY | Facility: CLINIC | Age: 40
End: 2020-03-17
Payer: COMMERCIAL

## 2020-03-17 DIAGNOSIS — Z71.89 COUNSELING REGARDING GOALS OF CARE: ICD-10-CM

## 2020-03-17 DIAGNOSIS — G35 MULTIPLE SCLEROSIS: Primary | ICD-10-CM

## 2020-03-17 DIAGNOSIS — E28.2 PCOS (POLYCYSTIC OVARIAN SYNDROME): ICD-10-CM

## 2020-03-17 DIAGNOSIS — Z79.899 ENCOUNTER FOR LONG-TERM (CURRENT) USE OF HIGH-RISK MEDICATION: ICD-10-CM

## 2020-03-17 DIAGNOSIS — I10 ESSENTIAL HYPERTENSION: ICD-10-CM

## 2020-03-17 PROCEDURE — 99214 OFFICE O/P EST MOD 30 MIN: CPT | Mod: GT,,, | Performed by: PHYSICIAN ASSISTANT

## 2020-03-17 PROCEDURE — 99214 PR OFFICE/OUTPT VISIT, EST, LEVL IV, 30-39 MIN: ICD-10-PCS | Mod: GT,,, | Performed by: PHYSICIAN ASSISTANT

## 2020-03-17 RX ORDER — FINGOLIMOD HYDROCHLORIDE 0.5 MG/1
CAPSULE ORAL
Qty: 22 CAPSULE | Refills: 2 | Status: SHIPPED | OUTPATIENT
Start: 2020-03-17 | End: 2020-05-19 | Stop reason: SDUPTHER

## 2020-03-17 NOTE — PROGRESS NOTES
Subjective:          Patient ID: Camila Dawn is a 39 y.o. female who presents today for a routine video visit for MS.   is present.     MS HPI:  · DMT: fingolimod-5 days a week  · Side effects from DMT? No  · Taking vitamin D3 as recommended? Yes - 4,000IU/d   · Patient had Dr. marie today with PCP-for BP check(elevated) and sinus issues--was given steroid injections  · Patient reminds herself that she did not fail Rebif in the past, but had injection fatigue.   · She uses CBD oil/jojo and does feel like it is helpful for her muscle spasms      Medications:  Current Outpatient Medications   Medication Sig    AFLURIA QD 2019-20,3YR UP,,PF, 60 mcg (15 mcg x 4)/0.5 mL Syrg ADM 0.5ML IM UTD    alpha lipoic acid 100 mg Cap Take by mouth.    ascorbic acid, vitamin C, (VITAMIN C) 500 MG tablet Take by mouth.    b complex vitamins tablet Take 1 tablet by mouth once daily.    cinnamon bark (CINNAMON ORAL) Take by mouth.    fexofenadine (ALLEGRA) 180 MG tablet Take 180 mg by mouth once daily.    fingolimod (GILENYA) 0.5 mg Cap TAKE 1 CAPSULE BY MOUTH ONCE DAILY MONDAY THROUGH FRIDAY    FLAXSEED ORAL Take by mouth.    fluticasone (FLONASE) 50 mcg/actuation nasal spray 1 spray by Each Nare route once daily.    lisinopril (PRINIVIL,ZESTRIL) 40 MG tablet Take 40 mg by mouth once daily.     loratadine (CLARITIN) 10 mg tablet loratadine 10 mg tablet   Take 1 tablet every day by oral route as needed.    metformin (GLUCOPHAGE) 1000 MG tablet TK 1 T PO  BID    modafinil (PROVIGIL) 200 MG Tab Take 200 mg by mouth once daily. Pt takes 100 mg PO Q AM and 100 mg PO at noon.    multivitamin (ONE DAILY MULTIVITAMIN) per tablet Take 1 tablet by mouth once daily.    norgestimate-ethinyl estradiol (ORTHO TRI-CYCLEN,TRI-SPRINTEC) 0.18/0.215/0.25 mg-35 mcg (28) tablet Take 1 tablet by mouth once daily.    sodium oxybate (XYREM) 500 mg/mL Soln Take by mouth.    triamcinolone acetonide 0.1% (KENALOG) 0.1 % cream  Apply topically continuous prn.     vitamin D 1000 units Tab Take 4,000 Units by mouth once daily.      No current facility-administered medications for this visit.        SOCIAL HISTORY  Social History     Tobacco Use    Smoking status: Never Smoker    Smokeless tobacco: Never Used   Substance Use Topics    Alcohol use: Yes     Comment: rarely    Drug use: No       Living arrangements - the patient lives with their spouse.    ROS:    REVIEW OF SYMPTOMS 3/10/2020   Do you feel abnormally tired on most days? No--Provigil 200mg/d   Do you feel you generally sleep well? Yes   Do you have difficulty controlling your bladder?  No   Do you have difficulty controlling your bowels?  No   Do you have frequent muscle cramps, tightness or spasms in your limbs?  No   Do you have new visual symptoms?  No   Do you have worsening difficulty with your memory or thinking? No   Do you have worsening symptoms of anxiety or depression?  No   For patients who walk, Do you have more difficulty walking?  No   Have you fallen since your last visit?  No   For patients who use wheelchairs: Do you have any skin wounds or breakdown? Not Applicable   Do you have difficulty using your hands?  No   Do you have shooting or burning pain? No   Do you have difficulty with sexual function?  No   If you are sexually active, are you using birth control? Y/N  N/A Yes   Do you often choke when swallowing liquids or solid food?  No   Do you experience worsening symptoms when overheated? Yes   Do you need any new equipment such as a wheelchair, walker or shower chair? No   Do you receive co-pay financial assistance for your principal MS medicine? Yes   Would you be interested in participating in an MS research trial in the future? No   For patients on Gilenya, Tecfidera, Aubagio, Rituxan, Ocrevus, Tysabri, Lemtrada or Methotrexate, are you aware that you should NOT receive live virus vaccines?  Yes   Do you feel you have adequate family/friend support?   Yes   Do you have health insurance?   Yes   Are you currently employed? No   Do you receive SSDI/SSI?  No   Do you use marijuana or cannabis products? Yes   How often? Weekly   Have you been diagnosed with a urinary tract infection since your last visit here? No   Have you been diagnosed with a respiratory tract infection since your last visit here? No   Have you been to the emergency room since your last visit here? No   Have you been hospitalized since your last visit here?  No                Objective:        1. 25 foot timed walk:  Timed 25 Foot Walk: 11/14/2016 7/18/2017   Did patient wear an AFO? No No   Was assistive device used? No No   Time for 25 Foot Walk (seconds) 3.4 3.6   Time for 25 Foot Walk (seconds) 3.2 -       2. 9 Hole Peg Test:  9-Hole Peg Test: 11/14/2016   Dominant Hand Right   Time (seconds) - DH Trial 1 18   Time (seconds) - DH Trial 2 17   Time (seconds) - NDH Trial 1 20   Time (seconds) - NDH Trial 2 19       Neurologic Exam     Mental Status   Oriented to person, place, and time.   Attention: normal.   Speech: speech is normal   Level of consciousness: alert  Normal comprehension.     Cranial Nerves     CN III, IV, VI   Extraocular motions are normal.     CN VII   Facial expression full, symmetric.     CN VIII   Hearing: intact (to conversation through video visit)    CN XII   Tongue deviation: none    Motor Exam   Muscle bulk: normalFull AROM observed of UE's/LE's     Gait, Coordination, and Reflexes     Gait  Gait: normal    Coordination   Tandem walking coordination: normal    Tremor   Resting tremor: absentPatient able to perform heel/toe walk normally  Patient able to hop on each foot individually         Imaging:     Results for orders placed during the hospital encounter of 12/16/19   MRI Brain Demyelinating Without Contrast    Impression Stable findings in cerebral white matter compatible with reported history of multiple sclerosis.  No new lesion identified to suggest ongoing  demyelination.    Electronically signed by resident: Yassine Isidro  Date:    12/16/2019  Time:    14:04    Electronically signed by: Devan Rodas MD  Date:    12/16/2019  Time:    15:31     No results found for this or any previous visit.  No results found for this or any previous visit.  Results for orders placed during the hospital encounter of 12/10/18   MRI Brain Demyelinating W W/O Contrast    Impression There has been no significant change from the prior examination of 09/07/2018 with continued scattered foci of T2/FLAIR hyperintensity within the supratentorial demyelinating plaque burden.  Please note there is continued diffusion hyperintensity associated with several of the lesions as well as some T1 hypointense lesions.  No definite new lesion or enhancement to suggest active demyelination.      Electronically signed by: Laura Johansen MD  Date:    12/10/2018  Time:    12:23     No results found for this or any previous visit.  No results found for this or any previous visit.      Labs:     Lab Results   Component Value Date    PKCHGFLJ88FM 78 09/17/2019    YMRDTJVU95XO 85 08/24/2018    KZZAGGTN55OH 95 09/19/2017     Lab Results   Component Value Date    JCVINDEX 0.20 (A) 09/17/2019    JCVANTIBODY Indeterminate (A) 09/17/2019     No results found for: CK6VMVYF, ABSOLUTECD3, PS4GQFIZ, ABSOLUTECD8, ON9IGVNN, ABSOLUTECD4, LABCD48  Lab Results   Component Value Date    WBC 5.35 03/16/2020    RBC 4.16 03/16/2020    HGB 13.0 03/16/2020    HCT 39.6 03/16/2020    MCV 95 03/16/2020    MCH 31.3 (H) 03/16/2020    MCHC 32.8 03/16/2020    RDW 13.0 03/16/2020     03/16/2020    MPV 10.0 03/16/2020    GRAN 4.0 03/16/2020    GRAN 73.8 (H) 03/16/2020    LYMPH 0.5 (L) 03/16/2020    LYMPH 9.0 (L) 03/16/2020    MONO 0.8 03/16/2020    MONO 15.5 (H) 03/16/2020    EOS 0.1 03/16/2020    BASO 0.02 03/16/2020    EOSINOPHIL 0.9 03/16/2020    BASOPHIL 0.4 03/16/2020     Sodium   Date Value Ref Range Status   03/14/2019 137 136  - 145 mmol/L Final     Potassium   Date Value Ref Range Status   03/14/2019 4.4 3.5 - 5.1 mmol/L Final     Chloride   Date Value Ref Range Status   03/14/2019 104 95 - 110 mmol/L Final     CO2   Date Value Ref Range Status   03/14/2019 27 23 - 29 mmol/L Final     Glucose   Date Value Ref Range Status   03/14/2019 80 70 - 110 mg/dL Final     BUN, Bld   Date Value Ref Range Status   03/14/2019 12 6 - 20 mg/dL Final     Creatinine   Date Value Ref Range Status   03/14/2019 0.7 0.5 - 1.4 mg/dL Final     Calcium   Date Value Ref Range Status   03/14/2019 9.3 8.7 - 10.5 mg/dL Final     Total Protein   Date Value Ref Range Status   03/16/2020 6.6 6.0 - 8.4 g/dL Final     Albumin   Date Value Ref Range Status   03/16/2020 3.2 (L) 3.5 - 5.2 g/dL Final     Total Bilirubin   Date Value Ref Range Status   03/16/2020 0.1 0.1 - 1.0 mg/dL Final     Comment:     For infants and newborns, interpretation of results should be based  on gestational age, weight and in agreement with clinical  observations.  Premature Infant recommended reference ranges:  Up to 24 hours.............<8.0 mg/dL  Up to 48 hours............<12.0 mg/dL  3-5 days..................<15.0 mg/dL  6-29 days.................<15.0 mg/dL       Alkaline Phosphatase   Date Value Ref Range Status   03/16/2020 62 55 - 135 U/L Final     AST   Date Value Ref Range Status   03/16/2020 21 10 - 40 U/L Final     ALT   Date Value Ref Range Status   03/16/2020 21 10 - 44 U/L Final     Anion Gap   Date Value Ref Range Status   03/14/2019 6 (L) 8 - 16 mmol/L Final     eGFR if    Date Value Ref Range Status   03/14/2019 >60.0 >60 mL/min/1.73 m^2 Final     eGFR if non    Date Value Ref Range Status   03/14/2019 >60.0 >60 mL/min/1.73 m^2 Final     Comment:     Calculation used to obtain the estimated glomerular filtration  rate (eGFR) is the CKD-EPI equation.        No results found for: HEPBSAG, HEPBSAB, HEPBCAB    ALC-[id number]    MS Impression and Plan:      NEURO MULTIPLE SCLEROSIS IMPRESSION:   MS Status:     Number of relapses in the past year?:  0    Clinical Progression:  Clinically Stable    MRI Progression:  Stable  Plan:     DMT:  No change in management     Next Imaging Due: 12/17/2020     Next Labs Due: 9/17/2020    Our visit today lasted 25 minutes, and 100% of this time was spent face to face with the patient. Over 50% of this visit included discussion of the treatment plan/medication changes/symptom management/exam findings/coordination of care.     Problem List Items Addressed This Visit        Neuro    Multiple sclerosis - Primary    Relevant Medications    fingolimod (GILENYA) 0.5 mg Cap       Cardiac/Vascular    Essential hypertension       Endocrine    PCOS (polycystic ovarian syndrome)      Other Visit Diagnoses     Counseling regarding goals of care        Encounter for long-term (current) use of high-risk medication          Follow up in about 3 months (around 6/17/2020) for follow up with Dr. Lundy.  Patient agreed to POC today.    Attending, Dr. Lundy, was available during today's encounter.     Nicole Barba PA-C  [facility]

## 2020-03-18 ENCOUNTER — PATIENT MESSAGE (OUTPATIENT)
Dept: NEUROLOGY | Facility: CLINIC | Age: 40
End: 2020-03-18

## 2020-04-08 ENCOUNTER — PATIENT MESSAGE (OUTPATIENT)
Dept: NEUROLOGY | Facility: CLINIC | Age: 40
End: 2020-04-08

## 2020-04-09 ENCOUNTER — PATIENT MESSAGE (OUTPATIENT)
Dept: NEUROLOGY | Facility: CLINIC | Age: 40
End: 2020-04-09

## 2020-04-18 ENCOUNTER — PATIENT MESSAGE (OUTPATIENT)
Dept: NEUROLOGY | Facility: CLINIC | Age: 40
End: 2020-04-18

## 2020-04-23 ENCOUNTER — PATIENT MESSAGE (OUTPATIENT)
Dept: OPHTHALMOLOGY | Facility: CLINIC | Age: 40
End: 2020-04-23

## 2020-04-30 ENCOUNTER — PATIENT MESSAGE (OUTPATIENT)
Dept: NEUROLOGY | Facility: CLINIC | Age: 40
End: 2020-04-30

## 2020-05-10 ENCOUNTER — PATIENT MESSAGE (OUTPATIENT)
Dept: NEUROLOGY | Facility: CLINIC | Age: 40
End: 2020-05-10

## 2020-05-10 DIAGNOSIS — G35 MULTIPLE SCLEROSIS: Primary | ICD-10-CM

## 2020-05-14 ENCOUNTER — LAB VISIT (OUTPATIENT)
Dept: LAB | Facility: HOSPITAL | Age: 40
End: 2020-05-14
Attending: INTERNAL MEDICINE
Payer: COMMERCIAL

## 2020-05-14 DIAGNOSIS — G35 MULTIPLE SCLEROSIS: ICD-10-CM

## 2020-05-14 LAB — SARS-COV-2 IGG SERPLBLD QL IA.RAPID: NEGATIVE

## 2020-05-14 PROCEDURE — 36415 COLL VENOUS BLD VENIPUNCTURE: CPT

## 2020-05-14 PROCEDURE — 86769 SARS-COV-2 COVID-19 ANTIBODY: CPT

## 2020-05-19 ENCOUNTER — PATIENT MESSAGE (OUTPATIENT)
Dept: NEUROLOGY | Facility: CLINIC | Age: 40
End: 2020-05-19

## 2020-05-19 DIAGNOSIS — G35 MULTIPLE SCLEROSIS: ICD-10-CM

## 2020-05-19 RX ORDER — FINGOLIMOD HYDROCHLORIDE 0.5 MG/1
CAPSULE ORAL
Qty: 22 CAPSULE | Refills: 2 | Status: SHIPPED | OUTPATIENT
Start: 2020-05-19 | End: 2020-09-28

## 2020-05-26 ENCOUNTER — PATIENT MESSAGE (OUTPATIENT)
Dept: NEUROLOGY | Facility: CLINIC | Age: 40
End: 2020-05-26

## 2020-07-10 ENCOUNTER — TELEPHONE (OUTPATIENT)
Dept: NEUROLOGY | Facility: CLINIC | Age: 40
End: 2020-07-10

## 2020-09-10 ENCOUNTER — PATIENT MESSAGE (OUTPATIENT)
Dept: NEUROLOGY | Facility: CLINIC | Age: 40
End: 2020-09-10

## 2020-09-10 DIAGNOSIS — E55.9 VITAMIN D INSUFFICIENCY: ICD-10-CM

## 2020-09-10 DIAGNOSIS — G35 MULTIPLE SCLEROSIS: Primary | ICD-10-CM

## 2020-09-17 ENCOUNTER — LAB VISIT (OUTPATIENT)
Dept: LAB | Facility: HOSPITAL | Age: 40
End: 2020-09-17
Attending: PSYCHIATRY & NEUROLOGY
Payer: COMMERCIAL

## 2020-09-17 DIAGNOSIS — G35 MULTIPLE SCLEROSIS: ICD-10-CM

## 2020-09-17 DIAGNOSIS — Z79.899 ENCOUNTER FOR LONG-TERM (CURRENT) USE OF HIGH-RISK MEDICATION: ICD-10-CM

## 2020-09-17 DIAGNOSIS — E55.9 VITAMIN D INSUFFICIENCY: ICD-10-CM

## 2020-09-17 LAB
25(OH)D3+25(OH)D2 SERPL-MCNC: 111 NG/ML (ref 30–96)
ALBUMIN SERPL BCP-MCNC: 3.5 G/DL (ref 3.5–5.2)
ALP SERPL-CCNC: 56 U/L (ref 55–135)
ALT SERPL W/O P-5'-P-CCNC: 19 U/L (ref 10–44)
ANION GAP SERPL CALC-SCNC: 11 MMOL/L (ref 8–16)
AST SERPL-CCNC: 20 U/L (ref 10–40)
BASOPHILS # BLD AUTO: 0.02 K/UL (ref 0–0.2)
BASOPHILS NFR BLD: 0.4 % (ref 0–1.9)
BILIRUB SERPL-MCNC: 0.4 MG/DL (ref 0.1–1)
BUN SERPL-MCNC: 9 MG/DL (ref 6–20)
CALCIUM SERPL-MCNC: 9.6 MG/DL (ref 8.7–10.5)
CHLORIDE SERPL-SCNC: 102 MMOL/L (ref 95–110)
CO2 SERPL-SCNC: 25 MMOL/L (ref 23–29)
CREAT SERPL-MCNC: 0.7 MG/DL (ref 0.5–1.4)
DIFFERENTIAL METHOD: ABNORMAL
EOSINOPHIL # BLD AUTO: 0 K/UL (ref 0–0.5)
EOSINOPHIL NFR BLD: 0.7 % (ref 0–8)
ERYTHROCYTE [DISTWIDTH] IN BLOOD BY AUTOMATED COUNT: 12.7 % (ref 11.5–14.5)
EST. GFR  (AFRICAN AMERICAN): >60 ML/MIN/1.73 M^2
EST. GFR  (NON AFRICAN AMERICAN): >60 ML/MIN/1.73 M^2
GLUCOSE SERPL-MCNC: 85 MG/DL (ref 70–110)
HCT VFR BLD AUTO: 40.5 % (ref 37–48.5)
HGB BLD-MCNC: 13.1 G/DL (ref 12–16)
IMM GRANULOCYTES # BLD AUTO: 0.02 K/UL (ref 0–0.04)
IMM GRANULOCYTES NFR BLD AUTO: 0.4 % (ref 0–0.5)
LYMPHOCYTES # BLD AUTO: 0.9 K/UL (ref 1–4.8)
LYMPHOCYTES NFR BLD: 16 % (ref 18–48)
MCH RBC QN AUTO: 30.8 PG (ref 27–31)
MCHC RBC AUTO-ENTMCNC: 32.3 G/DL (ref 32–36)
MCV RBC AUTO: 95 FL (ref 82–98)
MONOCYTES # BLD AUTO: 0.8 K/UL (ref 0.3–1)
MONOCYTES NFR BLD: 13.7 % (ref 4–15)
NEUTROPHILS # BLD AUTO: 3.9 K/UL (ref 1.8–7.7)
NEUTROPHILS NFR BLD: 68.8 % (ref 38–73)
NRBC BLD-RTO: 0 /100 WBC
PLATELET # BLD AUTO: 329 K/UL (ref 150–350)
PMV BLD AUTO: 10.1 FL (ref 9.2–12.9)
POTASSIUM SERPL-SCNC: 4.5 MMOL/L (ref 3.5–5.1)
PROT SERPL-MCNC: 6.8 G/DL (ref 6–8.4)
RBC # BLD AUTO: 4.25 M/UL (ref 4–5.4)
SODIUM SERPL-SCNC: 138 MMOL/L (ref 136–145)
WBC # BLD AUTO: 5.68 K/UL (ref 3.9–12.7)

## 2020-09-17 PROCEDURE — 36415 COLL VENOUS BLD VENIPUNCTURE: CPT

## 2020-09-17 PROCEDURE — 82306 VITAMIN D 25 HYDROXY: CPT

## 2020-09-17 PROCEDURE — 80053 COMPREHEN METABOLIC PANEL: CPT

## 2020-09-17 PROCEDURE — 85025 COMPLETE CBC W/AUTO DIFF WBC: CPT

## 2020-09-18 ENCOUNTER — PATIENT MESSAGE (OUTPATIENT)
Dept: NEUROLOGY | Facility: CLINIC | Age: 40
End: 2020-09-18

## 2020-09-18 DIAGNOSIS — G35 MULTIPLE SCLEROSIS: Primary | ICD-10-CM

## 2020-09-18 DIAGNOSIS — E55.9 VITAMIN D INSUFFICIENCY: ICD-10-CM

## 2020-09-23 ENCOUNTER — OFFICE VISIT (OUTPATIENT)
Dept: OPHTHALMOLOGY | Facility: CLINIC | Age: 40
End: 2020-09-23
Payer: COMMERCIAL

## 2020-09-23 DIAGNOSIS — Z79.899 LONG-TERM CURRENT USE OF HIGH RISK MEDICATION OTHER THAN ANTICOAGULANT: ICD-10-CM

## 2020-09-23 DIAGNOSIS — G35 MULTIPLE SCLEROSIS: Primary | ICD-10-CM

## 2020-09-23 PROCEDURE — 92014 PR EYE EXAM, EST PATIENT,COMPREHESV: ICD-10-PCS | Mod: S$GLB,,, | Performed by: OPHTHALMOLOGY

## 2020-09-23 PROCEDURE — 92134 POSTERIOR SEGMENT OCT RETINA (OCULAR COHERENCE TOMOGRAPHY)-BOTH EYES: ICD-10-PCS | Mod: S$GLB,,, | Performed by: OPHTHALMOLOGY

## 2020-09-23 PROCEDURE — 99999 PR PBB SHADOW E&M-EST. PATIENT-LVL IV: CPT | Mod: PBBFAC,,, | Performed by: OPHTHALMOLOGY

## 2020-09-23 PROCEDURE — 92134 CPTRZ OPH DX IMG PST SGM RTA: CPT | Mod: S$GLB,,, | Performed by: OPHTHALMOLOGY

## 2020-09-23 PROCEDURE — 99999 PR PBB SHADOW E&M-EST. PATIENT-LVL IV: ICD-10-PCS | Mod: PBBFAC,,, | Performed by: OPHTHALMOLOGY

## 2020-09-23 PROCEDURE — 92014 COMPRE OPH EXAM EST PT 1/>: CPT | Mod: S$GLB,,, | Performed by: OPHTHALMOLOGY

## 2020-09-23 NOTE — PROGRESS NOTES
HPI     Concerns About Ocular Health      Additional comments: Partial optic atrophy OU              Comments     DLS:09/17/2019 Mohan  Multiple Sclerosis  Patient here for annual check up for Gilenya and OCT(mac).  Pt states OU vision stable.  No eye pain.    I have personally interviewed the patient, reviewed the history and   examined the patient and agree with the technician's exam.          Last edited by Devan Preston MD on 9/23/2020  1:33 PM. (History)            Assessment /Plan     For exam results, see Encounter Report.    Multiple sclerosis  -     Posterior Segment OCT Retina-Both eyes    Long-term current use of high risk medication other than anticoagulant  -     Posterior Segment OCT Retina-Both eyes      I found no evidence of Gilenya retinal toxicity or of progression of her MS. I will repeat her exam and OCT in one year.

## 2020-09-28 ENCOUNTER — OFFICE VISIT (OUTPATIENT)
Dept: NEUROLOGY | Facility: CLINIC | Age: 40
End: 2020-09-28
Payer: COMMERCIAL

## 2020-09-28 DIAGNOSIS — Z71.89 COUNSELING REGARDING GOALS OF CARE: ICD-10-CM

## 2020-09-28 DIAGNOSIS — G35 MS (MULTIPLE SCLEROSIS): Primary | ICD-10-CM

## 2020-09-28 DIAGNOSIS — D84.9 IMMUNOSUPPRESSION: ICD-10-CM

## 2020-09-28 PROCEDURE — 99215 OFFICE O/P EST HI 40 MIN: CPT | Mod: 95,,, | Performed by: PSYCHIATRY & NEUROLOGY

## 2020-09-28 PROCEDURE — 99215 PR OFFICE/OUTPT VISIT, EST, LEVL V, 40-54 MIN: ICD-10-PCS | Mod: 95,,, | Performed by: PSYCHIATRY & NEUROLOGY

## 2020-09-28 NOTE — PROGRESS NOTES
Subjective:        The patient location is: home  The chief complaint leading to consultation is: MS    Visit type: audiovisual    Face to Face time with patient: 30  40 minutes of total time spent on the encounter, which includes face to face time and non-face to face time preparing to see the patient (eg, review of tests), Obtaining and/or reviewing separately obtained history, Documenting clinical information in the electronic or other health record, Independently interpreting results (not separately reported) and communicating results to the patient/family/caregiver, or Care coordination (not separately reported).     Each patient to whom he or she provides medical services by telemedicine is:  (1) informed of the relationship between the physician and patient and the respective role of any other health care provider with respect to management of the patient; and (2) notified that he or she may decline to receive medical services by telemedicine and may withdraw from such care at any time.    Notes:     Patient ID: Camila Dawn is a 40 y.o. female who presents today for a routine video visit for MS.      MS HPI:   · DMT: fingolimod 5 days/week  · Side effects from DMT? No  · Taking vitamin D3 as recommended? Yes - was taking 8,000 IU/day (level just resulted high), so now taking 2300/day  · Had an episode recently of blurry vision that lasted 2 hours; improved; saw Dr. Preston--eyes are stable;   · Has been strictly quarantining since March; feeling isolated; but in therapy weekly; also exercising regularly at home (walking, biking, videos)  · Walking stable;   · Has also seen derm--skin stable    Medications:  Current Outpatient Medications   Medication Sig    AFLURIA QD 2019-20,3YR UP,,PF, 60 mcg (15 mcg x 4)/0.5 mL Syrg ADM 0.5ML IM UTD    alpha lipoic acid 100 mg Cap Take by mouth.    ascorbic acid, vitamin C, (VITAMIN C) 500 MG tablet Take by mouth.    b complex vitamins tablet Take 1 tablet by  mouth once daily.    cinnamon bark (CINNAMON ORAL) Take by mouth.    fexofenadine (ALLEGRA) 180 MG tablet Take 180 mg by mouth once daily.    fingolimod (GILENYA) 0.5 mg Cap TAKE 1 CAPSULE BY MOUTH ONCE DAILY MONDAY THROUGH FRIDAY    FLAXSEED ORAL Take by mouth.    fluticasone (FLONASE) 50 mcg/actuation nasal spray 1 spray by Each Nare route once daily.    lisinopril (PRINIVIL,ZESTRIL) 40 MG tablet Take 40 mg by mouth once daily.     loratadine (CLARITIN) 10 mg tablet loratadine 10 mg tablet   Take 1 tablet every day by oral route as needed.    metformin (GLUCOPHAGE) 1000 MG tablet TK 1 T PO  BID    modafinil (PROVIGIL) 200 MG Tab Take 200 mg by mouth once daily. Pt takes 100 mg PO Q AM and 100 mg PO at noon.    multivitamin (ONE DAILY MULTIVITAMIN) per tablet Take 1 tablet by mouth once daily.    norgestimate-ethinyl estradiol (ORTHO TRI-CYCLEN,TRI-SPRINTEC) 0.18/0.215/0.25 mg-35 mcg (28) tablet Take 1 tablet by mouth once daily.    sodium oxybate (XYREM) 500 mg/mL Soln Take by mouth.    triamcinolone acetonide 0.1% (KENALOG) 0.1 % cream Apply topically continuous prn.     vitamin D 1000 units Tab Take 4,000 Units by mouth once daily.      No current facility-administered medications for this visit.        SOCIAL HISTORY  Social History     Tobacco Use    Smoking status: Never Smoker    Smokeless tobacco: Never Used   Substance Use Topics    Alcohol use: Yes     Comment: rarely    Drug use: No       Living arrangements - the patient lives with their spouse.         Objective:        Timed 25 Foot Walk: 11/14/2016 7/18/2017   Did patient wear an AFO? No No   Was assistive device used? No No   Time for 25 Foot Walk (seconds) 3.4 3.6   Time for 25 Foot Walk (seconds) 3.2 -     Neurologic Exam  MS: fluent, normal comprehension and attention  CN: no dysarthria; no facial asymmetry  MOTOR: moves all limbs well  COORD: normal FTN  GAIT: normal causal gait      Imaging:     Results for orders placed  during the hospital encounter of 12/16/19   MRI Brain Demyelinating Without Contrast    Impression Stable findings in cerebral white matter compatible with reported history of multiple sclerosis.  No new lesion identified to suggest ongoing demyelination.    Electronically signed by resident: Yassine Isidor  Date:    12/16/2019  Time:    14:04    Electronically signed by: Devan Rodas MD  Date:    12/16/2019  Time:    15:31     No results found for this or any previous visit.  No results found for this or any previous visit.  Results for orders placed during the hospital encounter of 12/10/18   MRI Brain Demyelinating W W/O Contrast    Impression There has been no significant change from the prior examination of 09/07/2018 with continued scattered foci of T2/FLAIR hyperintensity within the supratentorial demyelinating plaque burden.  Please note there is continued diffusion hyperintensity associated with several of the lesions as well as some T1 hypointense lesions.  No definite new lesion or enhancement to suggest active demyelination.      Electronically signed by: Laura Johansen MD  Date:    12/10/2018  Time:    12:23     No results found for this or any previous visit.  No results found for this or any previous visit.      Labs:     Lab Results   Component Value Date    TVDQUIDG80YR 111 (H) 09/17/2020    GIQQGISQ10XB 78 09/17/2019    VAFTEBEY09OE 85 08/24/2018     Lab Results   Component Value Date    JCVINDEX 0.20 (A) 09/17/2019    JCVANTIBODY Indeterminate (A) 09/17/2019     No results found for: JS6EIPNY, ABSOLUTECD3, QC1HLJCF, ABSOLUTECD8, CQ9GVWNC, ABSOLUTECD4, LABCD48  Lab Results   Component Value Date    WBC 5.68 09/17/2020    RBC 4.25 09/17/2020    HGB 13.1 09/17/2020    HCT 40.5 09/17/2020    MCV 95 09/17/2020    MCH 30.8 09/17/2020    MCHC 32.3 09/17/2020    RDW 12.7 09/17/2020     09/17/2020    MPV 10.1 09/17/2020    GRAN 3.9 09/17/2020    GRAN 68.8 09/17/2020    LYMPH 0.9 (L) 09/17/2020     LYMPH 16.0 (L) 09/17/2020    MONO 0.8 09/17/2020    MONO 13.7 09/17/2020    EOS 0.0 09/17/2020    BASO 0.02 09/17/2020    EOSINOPHIL 0.7 09/17/2020    BASOPHIL 0.4 09/17/2020     Sodium   Date Value Ref Range Status   09/17/2020 138 136 - 145 mmol/L Final     Potassium   Date Value Ref Range Status   09/17/2020 4.5 3.5 - 5.1 mmol/L Final     Chloride   Date Value Ref Range Status   09/17/2020 102 95 - 110 mmol/L Final     CO2   Date Value Ref Range Status   09/17/2020 25 23 - 29 mmol/L Final     Glucose   Date Value Ref Range Status   09/17/2020 85 70 - 110 mg/dL Final     BUN, Bld   Date Value Ref Range Status   09/17/2020 9 6 - 20 mg/dL Final     Creatinine   Date Value Ref Range Status   09/17/2020 0.7 0.5 - 1.4 mg/dL Final     Calcium   Date Value Ref Range Status   09/17/2020 9.6 8.7 - 10.5 mg/dL Final     Total Protein   Date Value Ref Range Status   09/17/2020 6.8 6.0 - 8.4 g/dL Final     Albumin   Date Value Ref Range Status   09/17/2020 3.5 3.5 - 5.2 g/dL Final     Total Bilirubin   Date Value Ref Range Status   09/17/2020 0.4 0.1 - 1.0 mg/dL Final     Comment:     For infants and newborns, interpretation of results should be based  on gestational age, weight and in agreement with clinical  observations.  Premature Infant recommended reference ranges:  Up to 24 hours.............<8.0 mg/dL  Up to 48 hours............<12.0 mg/dL  3-5 days..................<15.0 mg/dL  6-29 days.................<15.0 mg/dL       Alkaline Phosphatase   Date Value Ref Range Status   09/17/2020 56 55 - 135 U/L Final     AST   Date Value Ref Range Status   09/17/2020 20 10 - 40 U/L Final     ALT   Date Value Ref Range Status   09/17/2020 19 10 - 44 U/L Final     Anion Gap   Date Value Ref Range Status   09/17/2020 11 8 - 16 mmol/L Final     eGFR if    Date Value Ref Range Status   09/17/2020 >60 >60 mL/min/1.73 m^2 Final     eGFR if non    Date Value Ref Range Status   09/17/2020 >60 >60  mL/min/1.73 m^2 Final     Comment:     Calculation used to obtain the estimated glomerular filtration  rate (eGFR) is the CKD-EPI equation.        No results found for: HEPBSAG, HEPBSAB, HEPBCAB        MS Impression and Plan:     NEURO MULTIPLE SCLEROSIS IMPRESSION:   MS Status:     Number of relapses in the past year?:  0    Clinical Progression:  Clinically Stable    MRI Progression:  Stable  Plan:     DMT:  No change in management    DMT comment:  Continue Gilenya 5 days/week;   Pt is up to date with eye, skin checks.     Symptom Management:  No change in symptom management     Next Imaging Due: 12/28/2020     Next Labs Due: 3/28/2021     F/u Nicole Barba PA-C in 6mo          Our visit today lasted 40 minutes, and 100% of this time was spent face to face with the patient. Over 50% of this visit included discussion of the treatment plan/medication changes/symptom management/exam findings/imaging results/coordination of care. The patient agrees with the plan of care.    Problem List Items Addressed This Visit        1 - High    MS (multiple sclerosis) - Primary    Relevant Orders    MRI Brain Demyelinating Without Contrast       Unprioritized    Immunosuppression      Other Visit Diagnoses     Counseling regarding goals of care              Jazmine Lundy MD

## 2020-10-19 ENCOUNTER — LAB VISIT (OUTPATIENT)
Dept: LAB | Facility: HOSPITAL | Age: 40
End: 2020-10-19
Attending: PHYSICIAN ASSISTANT
Payer: COMMERCIAL

## 2020-10-19 ENCOUNTER — PATIENT MESSAGE (OUTPATIENT)
Dept: NEUROLOGY | Facility: CLINIC | Age: 40
End: 2020-10-19

## 2020-10-19 DIAGNOSIS — E55.9 VITAMIN D INSUFFICIENCY: ICD-10-CM

## 2020-10-19 DIAGNOSIS — G35 MULTIPLE SCLEROSIS: ICD-10-CM

## 2020-10-19 LAB — 25(OH)D3+25(OH)D2 SERPL-MCNC: 88 NG/ML (ref 30–96)

## 2020-10-19 PROCEDURE — 36415 COLL VENOUS BLD VENIPUNCTURE: CPT

## 2020-10-19 PROCEDURE — 82306 VITAMIN D 25 HYDROXY: CPT

## 2020-10-20 ENCOUNTER — PATIENT MESSAGE (OUTPATIENT)
Dept: NEUROLOGY | Facility: CLINIC | Age: 40
End: 2020-10-20

## 2020-10-21 ENCOUNTER — PATIENT MESSAGE (OUTPATIENT)
Dept: NEUROLOGY | Facility: CLINIC | Age: 40
End: 2020-10-21

## 2020-10-30 ENCOUNTER — PATIENT MESSAGE (OUTPATIENT)
Dept: NEUROLOGY | Facility: CLINIC | Age: 40
End: 2020-10-30

## 2020-11-02 ENCOUNTER — PATIENT MESSAGE (OUTPATIENT)
Dept: PSYCHIATRY | Facility: CLINIC | Age: 40
End: 2020-11-02

## 2020-11-09 ENCOUNTER — PATIENT MESSAGE (OUTPATIENT)
Dept: NEUROLOGY | Facility: CLINIC | Age: 40
End: 2020-11-09

## 2020-11-16 ENCOUNTER — PATIENT MESSAGE (OUTPATIENT)
Dept: NEUROLOGY | Facility: CLINIC | Age: 40
End: 2020-11-16

## 2020-11-17 ENCOUNTER — PATIENT MESSAGE (OUTPATIENT)
Dept: NEUROLOGY | Facility: CLINIC | Age: 40
End: 2020-11-17

## 2020-12-09 ENCOUNTER — PATIENT MESSAGE (OUTPATIENT)
Dept: NEUROLOGY | Facility: CLINIC | Age: 40
End: 2020-12-09

## 2020-12-18 ENCOUNTER — PATIENT MESSAGE (OUTPATIENT)
Dept: NEUROLOGY | Facility: CLINIC | Age: 40
End: 2020-12-18

## 2020-12-18 ENCOUNTER — PATIENT MESSAGE (OUTPATIENT)
Dept: PSYCHIATRY | Facility: CLINIC | Age: 40
End: 2020-12-18

## 2020-12-18 NOTE — TELEPHONE ENCOUNTER
Faxed disability form to Tomasz [employer] at [phone] and uploaded copy into chart.  Notified pt via [employer] message.

## 2021-01-21 ENCOUNTER — PATIENT MESSAGE (OUTPATIENT)
Dept: NEUROLOGY | Facility: CLINIC | Age: 41
End: 2021-01-21

## 2021-01-25 ENCOUNTER — HOSPITAL ENCOUNTER (OUTPATIENT)
Dept: RADIOLOGY | Facility: HOSPITAL | Age: 41
Discharge: HOME | End: 2021-01-25
Attending: PSYCHIATRY & NEUROLOGY
Payer: COMMERCIAL

## 2021-01-25 DIAGNOSIS — G35 MS (MULTIPLE SCLEROSIS): ICD-10-CM

## 2021-01-25 PROCEDURE — 70551 MRI BRAIN DEMYELINATING WITHOUT CONTRAST: ICD-10-PCS | Mod: 26,,, | Performed by: RADIOLOGY

## 2021-01-25 PROCEDURE — 70551 MRI BRAIN STEM W/O DYE: CPT | Mod: 26,,, | Performed by: RADIOLOGY

## 2021-01-25 PROCEDURE — 70551 MRI BRAIN STEM W/O DYE: CPT | Mod: TC

## 2021-02-05 ENCOUNTER — PATIENT MESSAGE (OUTPATIENT)
Dept: NEUROLOGY | Facility: CLINIC | Age: 41
End: 2021-02-05

## 2021-02-26 ENCOUNTER — PATIENT MESSAGE (OUTPATIENT)
Dept: NEUROLOGY | Facility: CLINIC | Age: 41
End: 2021-02-26

## 2021-03-04 ENCOUNTER — PATIENT MESSAGE (OUTPATIENT)
Dept: NEUROLOGY | Facility: CLINIC | Age: 41
End: 2021-03-04

## 2021-03-08 ENCOUNTER — PATIENT MESSAGE (OUTPATIENT)
Dept: NEUROLOGY | Facility: CLINIC | Age: 41
End: 2021-03-08

## 2021-03-09 ENCOUNTER — PATIENT MESSAGE (OUTPATIENT)
Dept: NEUROLOGY | Facility: CLINIC | Age: 41
End: 2021-03-09

## 2021-03-24 ENCOUNTER — TELEPHONE (OUTPATIENT)
Dept: NEUROLOGY | Facility: CLINIC | Age: 41
End: 2021-03-24

## 2021-03-24 RX ORDER — (CALCIUM, MAGNESIUM, POTASSIUM, AND SODIUM OXYBATES) .5; .5; .5; .5 G/ML; G/ML; G/ML; G/ML
4.5 SOLUTION ORAL NIGHTLY
COMMUNITY

## 2021-03-26 ENCOUNTER — LAB VISIT (OUTPATIENT)
Dept: LAB | Facility: HOSPITAL | Age: 41
End: 2021-03-26
Payer: COMMERCIAL

## 2021-03-26 ENCOUNTER — OFFICE VISIT (OUTPATIENT)
Dept: NEUROLOGY | Facility: CLINIC | Age: 41
End: 2021-03-26
Payer: COMMERCIAL

## 2021-03-26 VITALS
SYSTOLIC BLOOD PRESSURE: 142 MMHG | DIASTOLIC BLOOD PRESSURE: 90 MMHG | BODY MASS INDEX: 23.19 KG/M2 | HEART RATE: 79 BPM | HEIGHT: 66 IN | WEIGHT: 144.31 LBS

## 2021-03-26 DIAGNOSIS — I10 ESSENTIAL HYPERTENSION: ICD-10-CM

## 2021-03-26 DIAGNOSIS — D84.821 IMMUNOSUPPRESSION DUE TO DRUG THERAPY: ICD-10-CM

## 2021-03-26 DIAGNOSIS — Z71.89 COUNSELING REGARDING GOALS OF CARE: ICD-10-CM

## 2021-03-26 DIAGNOSIS — Z79.899 IMMUNOSUPPRESSION DUE TO DRUG THERAPY: ICD-10-CM

## 2021-03-26 DIAGNOSIS — G35 MS (MULTIPLE SCLEROSIS): Primary | ICD-10-CM

## 2021-03-26 DIAGNOSIS — G35 MS (MULTIPLE SCLEROSIS): ICD-10-CM

## 2021-03-26 DIAGNOSIS — E55.9 VITAMIN D INSUFFICIENCY: ICD-10-CM

## 2021-03-26 LAB
ALBUMIN SERPL BCP-MCNC: 3.6 G/DL (ref 3.5–5.2)
ALP SERPL-CCNC: 66 U/L (ref 55–135)
ALT SERPL W/O P-5'-P-CCNC: 25 U/L (ref 10–44)
ANION GAP SERPL CALC-SCNC: 9 MMOL/L (ref 8–16)
AST SERPL-CCNC: 29 U/L (ref 10–40)
BASOPHILS # BLD AUTO: 0.01 K/UL (ref 0–0.2)
BASOPHILS NFR BLD: 0.2 % (ref 0–1.9)
BILIRUB SERPL-MCNC: 0.4 MG/DL (ref 0.1–1)
BUN SERPL-MCNC: 11 MG/DL (ref 6–20)
CALCIUM SERPL-MCNC: 9.3 MG/DL (ref 8.7–10.5)
CHLORIDE SERPL-SCNC: 104 MMOL/L (ref 95–110)
CO2 SERPL-SCNC: 26 MMOL/L (ref 23–29)
CREAT SERPL-MCNC: 0.7 MG/DL (ref 0.5–1.4)
DIFFERENTIAL METHOD: ABNORMAL
EOSINOPHIL # BLD AUTO: 0 K/UL (ref 0–0.5)
EOSINOPHIL NFR BLD: 0.7 % (ref 0–8)
ERYTHROCYTE [DISTWIDTH] IN BLOOD BY AUTOMATED COUNT: 12.7 % (ref 11.5–14.5)
EST. GFR  (AFRICAN AMERICAN): >60 ML/MIN/1.73 M^2
EST. GFR  (NON AFRICAN AMERICAN): >60 ML/MIN/1.73 M^2
GLUCOSE SERPL-MCNC: 84 MG/DL (ref 70–110)
HCT VFR BLD AUTO: 41.8 % (ref 37–48.5)
HGB BLD-MCNC: 13.5 G/DL (ref 12–16)
IMM GRANULOCYTES # BLD AUTO: 0.02 K/UL (ref 0–0.04)
IMM GRANULOCYTES NFR BLD AUTO: 0.5 % (ref 0–0.5)
LYMPHOCYTES # BLD AUTO: 0.7 K/UL (ref 1–4.8)
LYMPHOCYTES NFR BLD: 16.4 % (ref 18–48)
MCH RBC QN AUTO: 31.5 PG (ref 27–31)
MCHC RBC AUTO-ENTMCNC: 32.3 G/DL (ref 32–36)
MCV RBC AUTO: 97 FL (ref 82–98)
MONOCYTES # BLD AUTO: 0.7 K/UL (ref 0.3–1)
MONOCYTES NFR BLD: 16.6 % (ref 4–15)
NEUTROPHILS # BLD AUTO: 2.6 K/UL (ref 1.8–7.7)
NEUTROPHILS NFR BLD: 65.6 % (ref 38–73)
NRBC BLD-RTO: 0 /100 WBC
PLATELET # BLD AUTO: 315 K/UL (ref 150–350)
PMV BLD AUTO: 10.3 FL (ref 9.2–12.9)
POTASSIUM SERPL-SCNC: 4.7 MMOL/L (ref 3.5–5.1)
PROT SERPL-MCNC: 6.8 G/DL (ref 6–8.4)
RBC # BLD AUTO: 4.29 M/UL (ref 4–5.4)
SODIUM SERPL-SCNC: 139 MMOL/L (ref 136–145)
WBC # BLD AUTO: 4.03 K/UL (ref 3.9–12.7)

## 2021-03-26 PROCEDURE — 99999 PR PBB SHADOW E&M-EST. PATIENT-LVL III: CPT | Mod: PBBFAC,,, | Performed by: PHYSICIAN ASSISTANT

## 2021-03-26 PROCEDURE — 3008F PR BODY MASS INDEX (BMI) DOCUMENTED: ICD-10-PCS | Mod: CPTII,S$GLB,, | Performed by: PHYSICIAN ASSISTANT

## 2021-03-26 PROCEDURE — 3080F PR MOST RECENT DIASTOLIC BLOOD PRESSURE >= 90 MM HG: ICD-10-PCS | Mod: CPTII,S$GLB,, | Performed by: PHYSICIAN ASSISTANT

## 2021-03-26 PROCEDURE — 80053 COMPREHEN METABOLIC PANEL: CPT | Performed by: PHYSICIAN ASSISTANT

## 2021-03-26 PROCEDURE — 99215 PR OFFICE/OUTPT VISIT, EST, LEVL V, 40-54 MIN: ICD-10-PCS | Mod: S$GLB,,, | Performed by: PHYSICIAN ASSISTANT

## 2021-03-26 PROCEDURE — 3080F DIAST BP >= 90 MM HG: CPT | Mod: CPTII,S$GLB,, | Performed by: PHYSICIAN ASSISTANT

## 2021-03-26 PROCEDURE — 36415 COLL VENOUS BLD VENIPUNCTURE: CPT | Performed by: PHYSICIAN ASSISTANT

## 2021-03-26 PROCEDURE — 3077F SYST BP >= 140 MM HG: CPT | Mod: CPTII,S$GLB,, | Performed by: PHYSICIAN ASSISTANT

## 2021-03-26 PROCEDURE — 3008F BODY MASS INDEX DOCD: CPT | Mod: CPTII,S$GLB,, | Performed by: PHYSICIAN ASSISTANT

## 2021-03-26 PROCEDURE — 1126F PR PAIN SEVERITY QUANTIFIED, NO PAIN PRESENT: ICD-10-PCS | Mod: S$GLB,,, | Performed by: PHYSICIAN ASSISTANT

## 2021-03-26 PROCEDURE — 99215 OFFICE O/P EST HI 40 MIN: CPT | Mod: S$GLB,,, | Performed by: PHYSICIAN ASSISTANT

## 2021-03-26 PROCEDURE — 3077F PR MOST RECENT SYSTOLIC BLOOD PRESSURE >= 140 MM HG: ICD-10-PCS | Mod: CPTII,S$GLB,, | Performed by: PHYSICIAN ASSISTANT

## 2021-03-26 PROCEDURE — 99999 PR PBB SHADOW E&M-EST. PATIENT-LVL III: ICD-10-PCS | Mod: PBBFAC,,, | Performed by: PHYSICIAN ASSISTANT

## 2021-03-26 PROCEDURE — 1126F AMNT PAIN NOTED NONE PRSNT: CPT | Mod: S$GLB,,, | Performed by: PHYSICIAN ASSISTANT

## 2021-03-26 PROCEDURE — 85025 COMPLETE CBC W/AUTO DIFF WBC: CPT | Performed by: PHYSICIAN ASSISTANT

## 2021-03-26 RX ORDER — AZELASTINE 1 MG/ML
2 SPRAY, METERED NASAL 2 TIMES DAILY
COMMUNITY
Start: 2021-03-16

## 2021-03-28 PROBLEM — Z79.899 IMMUNOSUPPRESSION DUE TO DRUG THERAPY: Status: ACTIVE | Noted: 2020-09-28

## 2021-03-28 PROBLEM — D84.821 IMMUNOSUPPRESSION DUE TO DRUG THERAPY: Status: ACTIVE | Noted: 2020-09-28

## 2021-04-22 ENCOUNTER — PATIENT MESSAGE (OUTPATIENT)
Dept: NEUROLOGY | Facility: CLINIC | Age: 41
End: 2021-04-22

## 2021-04-27 ENCOUNTER — PATIENT MESSAGE (OUTPATIENT)
Dept: NEUROLOGY | Facility: CLINIC | Age: 41
End: 2021-04-27

## 2021-05-13 ENCOUNTER — PATIENT MESSAGE (OUTPATIENT)
Dept: NEUROLOGY | Facility: CLINIC | Age: 41
End: 2021-05-13

## 2021-05-31 ENCOUNTER — PATIENT MESSAGE (OUTPATIENT)
Dept: PSYCHIATRY | Facility: CLINIC | Age: 41
End: 2021-05-31

## 2021-06-21 ENCOUNTER — PATIENT MESSAGE (OUTPATIENT)
Dept: NEUROLOGY | Facility: CLINIC | Age: 41
End: 2021-06-21

## 2021-06-29 ENCOUNTER — PATIENT MESSAGE (OUTPATIENT)
Dept: NEUROLOGY | Facility: CLINIC | Age: 41
End: 2021-06-29

## 2021-08-17 ENCOUNTER — PATIENT MESSAGE (OUTPATIENT)
Dept: NEUROLOGY | Facility: CLINIC | Age: 41
End: 2021-08-17

## 2021-09-01 ENCOUNTER — PATIENT MESSAGE (OUTPATIENT)
Dept: PSYCHIATRY | Facility: CLINIC | Age: 41
End: 2021-09-01

## 2021-09-02 ENCOUNTER — PATIENT MESSAGE (OUTPATIENT)
Dept: PSYCHIATRY | Facility: CLINIC | Age: 41
End: 2021-09-02

## 2021-09-05 ENCOUNTER — PATIENT MESSAGE (OUTPATIENT)
Dept: NEUROLOGY | Facility: CLINIC | Age: 41
End: 2021-09-05

## 2021-09-05 DIAGNOSIS — G35 MS (MULTIPLE SCLEROSIS): Primary | ICD-10-CM

## 2021-09-07 ENCOUNTER — PATIENT MESSAGE (OUTPATIENT)
Dept: NEUROLOGY | Facility: CLINIC | Age: 41
End: 2021-09-07

## 2021-09-07 DIAGNOSIS — G35 MULTIPLE SCLEROSIS: ICD-10-CM

## 2021-09-09 ENCOUNTER — PATIENT MESSAGE (OUTPATIENT)
Dept: NEUROLOGY | Facility: CLINIC | Age: 41
End: 2021-09-09

## 2021-09-15 RX ORDER — FINGOLIMOD HCL 0.5 MG/1
CAPSULE ORAL
Qty: 22 CAPSULE | Refills: 2 | Status: SHIPPED | OUTPATIENT
Start: 2021-09-15 | End: 2021-12-09 | Stop reason: SDUPTHER

## 2021-09-20 ENCOUNTER — PATIENT MESSAGE (OUTPATIENT)
Dept: NEUROLOGY | Facility: CLINIC | Age: 41
End: 2021-09-20

## 2021-09-27 ENCOUNTER — OFFICE VISIT (OUTPATIENT)
Dept: NEUROLOGY | Facility: CLINIC | Age: 41
End: 2021-09-27
Payer: COMMERCIAL

## 2021-09-27 DIAGNOSIS — G35 MULTIPLE SCLEROSIS: Primary | ICD-10-CM

## 2021-09-27 DIAGNOSIS — Z29.89 PROPHYLACTIC IMMUNOTHERAPY: ICD-10-CM

## 2021-09-27 DIAGNOSIS — Z79.899 HIGH RISK MEDICATION USE: ICD-10-CM

## 2021-09-27 DIAGNOSIS — Z71.89 COUNSELING REGARDING GOALS OF CARE: ICD-10-CM

## 2021-09-27 PROCEDURE — 99215 PR OFFICE/OUTPT VISIT, EST, LEVL V, 40-54 MIN: ICD-10-PCS | Mod: 95,,, | Performed by: CLINICAL NURSE SPECIALIST

## 2021-09-27 PROCEDURE — 4010F ACE/ARB THERAPY RXD/TAKEN: CPT | Mod: CPTII,95,, | Performed by: CLINICAL NURSE SPECIALIST

## 2021-09-27 PROCEDURE — 1159F PR MEDICATION LIST DOCUMENTED IN MEDICAL RECORD: ICD-10-PCS | Mod: CPTII,95,, | Performed by: CLINICAL NURSE SPECIALIST

## 2021-09-27 PROCEDURE — 4010F PR ACE/ARB THEARPY RXD/TAKEN: ICD-10-PCS | Mod: CPTII,95,, | Performed by: CLINICAL NURSE SPECIALIST

## 2021-09-27 PROCEDURE — 99215 OFFICE O/P EST HI 40 MIN: CPT | Mod: 95,,, | Performed by: CLINICAL NURSE SPECIALIST

## 2021-09-27 PROCEDURE — 1159F MED LIST DOCD IN RCRD: CPT | Mod: CPTII,95,, | Performed by: CLINICAL NURSE SPECIALIST

## 2021-10-08 ENCOUNTER — TELEPHONE (OUTPATIENT)
Dept: NEUROLOGY | Facility: CLINIC | Age: 41
End: 2021-10-08

## 2021-10-08 ENCOUNTER — PATIENT MESSAGE (OUTPATIENT)
Dept: NEUROLOGY | Facility: CLINIC | Age: 41
End: 2021-10-08

## 2021-10-19 ENCOUNTER — PATIENT MESSAGE (OUTPATIENT)
Dept: NEUROLOGY | Facility: CLINIC | Age: 41
End: 2021-10-19
Payer: COMMERCIAL

## 2021-10-21 ENCOUNTER — OFFICE VISIT (OUTPATIENT)
Dept: OPHTHALMOLOGY | Facility: CLINIC | Age: 41
End: 2021-10-21
Payer: COMMERCIAL

## 2021-10-21 DIAGNOSIS — G35 MULTIPLE SCLEROSIS: Primary | ICD-10-CM

## 2021-10-21 DIAGNOSIS — Z79.899 LONG-TERM CURRENT USE OF HIGH RISK MEDICATION OTHER THAN ANTICOAGULANT: ICD-10-CM

## 2021-10-21 DIAGNOSIS — H47.293 PARTIAL OPTIC ATROPHY OF BOTH EYES: ICD-10-CM

## 2021-10-21 DIAGNOSIS — G35 MS (MULTIPLE SCLEROSIS): Primary | ICD-10-CM

## 2021-10-21 PROCEDURE — 4010F ACE/ARB THERAPY RXD/TAKEN: CPT | Mod: CPTII,S$GLB,, | Performed by: OPHTHALMOLOGY

## 2021-10-21 PROCEDURE — 1159F MED LIST DOCD IN RCRD: CPT | Mod: CPTII,S$GLB,, | Performed by: OPHTHALMOLOGY

## 2021-10-21 PROCEDURE — 1160F RVW MEDS BY RX/DR IN RCRD: CPT | Mod: CPTII,S$GLB,, | Performed by: OPHTHALMOLOGY

## 2021-10-21 PROCEDURE — 99213 PR OFFICE/OUTPT VISIT, EST, LEVL III, 20-29 MIN: ICD-10-PCS | Mod: S$GLB,,, | Performed by: OPHTHALMOLOGY

## 2021-10-21 PROCEDURE — 4010F PR ACE/ARB THEARPY RXD/TAKEN: ICD-10-PCS | Mod: CPTII,S$GLB,, | Performed by: OPHTHALMOLOGY

## 2021-10-21 PROCEDURE — 99999 PR PBB SHADOW E&M-EST. PATIENT-LVL III: ICD-10-PCS | Mod: PBBFAC,,, | Performed by: OPHTHALMOLOGY

## 2021-10-21 PROCEDURE — 99999 PR PBB SHADOW E&M-EST. PATIENT-LVL III: CPT | Mod: PBBFAC,,, | Performed by: OPHTHALMOLOGY

## 2021-10-21 PROCEDURE — 99213 OFFICE O/P EST LOW 20 MIN: CPT | Mod: S$GLB,,, | Performed by: OPHTHALMOLOGY

## 2021-10-21 PROCEDURE — 92134 POSTERIOR SEGMENT OCT RETINA (OCULAR COHERENCE TOMOGRAPHY)-BOTH EYES: ICD-10-PCS | Mod: S$GLB,,, | Performed by: OPHTHALMOLOGY

## 2021-10-21 PROCEDURE — 1160F PR REVIEW ALL MEDS BY PRESCRIBER/CLIN PHARMACIST DOCUMENTED: ICD-10-PCS | Mod: CPTII,S$GLB,, | Performed by: OPHTHALMOLOGY

## 2021-10-21 PROCEDURE — 92134 CPTRZ OPH DX IMG PST SGM RTA: CPT | Mod: S$GLB,,, | Performed by: OPHTHALMOLOGY

## 2021-10-21 PROCEDURE — 1159F PR MEDICATION LIST DOCUMENTED IN MEDICAL RECORD: ICD-10-PCS | Mod: CPTII,S$GLB,, | Performed by: OPHTHALMOLOGY

## 2021-12-09 DIAGNOSIS — G35 MULTIPLE SCLEROSIS: ICD-10-CM

## 2021-12-09 RX ORDER — FINGOLIMOD HCL 0.5 MG/1
CAPSULE ORAL
Qty: 22 CAPSULE | Refills: 2 | Status: SHIPPED | OUTPATIENT
Start: 2021-12-09 | End: 2022-02-21

## 2021-12-16 ENCOUNTER — PATIENT MESSAGE (OUTPATIENT)
Dept: NEUROLOGY | Facility: CLINIC | Age: 41
End: 2021-12-16
Payer: COMMERCIAL

## 2021-12-21 ENCOUNTER — PATIENT MESSAGE (OUTPATIENT)
Dept: NEUROLOGY | Facility: CLINIC | Age: 41
End: 2021-12-21
Payer: COMMERCIAL

## 2022-01-06 ENCOUNTER — PATIENT MESSAGE (OUTPATIENT)
Dept: NEUROLOGY | Facility: CLINIC | Age: 42
End: 2022-01-06
Payer: COMMERCIAL

## 2022-01-07 ENCOUNTER — PATIENT MESSAGE (OUTPATIENT)
Dept: NEUROLOGY | Facility: CLINIC | Age: 42
End: 2022-01-07
Payer: COMMERCIAL

## 2022-01-19 ENCOUNTER — PATIENT MESSAGE (OUTPATIENT)
Dept: NEUROLOGY | Facility: CLINIC | Age: 42
End: 2022-01-19
Payer: COMMERCIAL

## 2022-01-19 DIAGNOSIS — D84.9 IMMUNOSUPPRESSION: Primary | ICD-10-CM

## 2022-01-27 ENCOUNTER — PATIENT MESSAGE (OUTPATIENT)
Dept: NEUROLOGY | Facility: CLINIC | Age: 42
End: 2022-01-27
Payer: COMMERCIAL

## 2022-02-20 DIAGNOSIS — G35 MULTIPLE SCLEROSIS: ICD-10-CM

## 2022-02-21 RX ORDER — FINGOLIMOD HCL 0.5 MG/1
CAPSULE ORAL
Qty: 22 CAPSULE | Refills: 1 | Status: SHIPPED | OUTPATIENT
Start: 2022-02-21 | End: 2022-05-09

## 2022-02-24 ENCOUNTER — PATIENT MESSAGE (OUTPATIENT)
Dept: NEUROLOGY | Facility: CLINIC | Age: 42
End: 2022-02-24
Payer: COMMERCIAL

## 2022-02-24 ENCOUNTER — HOSPITAL ENCOUNTER (OUTPATIENT)
Dept: RADIOLOGY | Facility: HOSPITAL | Age: 42
Discharge: HOME | End: 2022-02-24
Attending: CLINICAL NURSE SPECIALIST
Payer: COMMERCIAL

## 2022-02-24 DIAGNOSIS — G35 MULTIPLE SCLEROSIS: ICD-10-CM

## 2022-02-24 PROCEDURE — 70551 MRI BRAIN STEM W/O DYE: CPT | Mod: 26,,, | Performed by: RADIOLOGY

## 2022-02-24 PROCEDURE — 70551 MRI BRAIN STEM W/O DYE: CPT | Mod: TC

## 2022-02-24 PROCEDURE — 70551 MRI BRAIN DEMYELINATING WITHOUT CONTRAST: ICD-10-PCS | Mod: 26,,, | Performed by: RADIOLOGY

## 2022-02-28 ENCOUNTER — PATIENT MESSAGE (OUTPATIENT)
Dept: PSYCHIATRY | Facility: CLINIC | Age: 42
End: 2022-02-28
Payer: COMMERCIAL

## 2022-03-02 ENCOUNTER — LAB VISIT (OUTPATIENT)
Dept: LAB | Facility: HOSPITAL | Age: 42
End: 2022-03-02
Attending: CLINICAL NURSE SPECIALIST
Payer: COMMERCIAL

## 2022-03-02 DIAGNOSIS — G35 MULTIPLE SCLEROSIS: ICD-10-CM

## 2022-03-02 DIAGNOSIS — D84.9 IMMUNOSUPPRESSION: ICD-10-CM

## 2022-03-02 LAB
25(OH)D3+25(OH)D2 SERPL-MCNC: 61 NG/ML (ref 30–96)
ALBUMIN SERPL BCP-MCNC: 3.2 G/DL (ref 3.5–5.2)
ALP SERPL-CCNC: 54 U/L (ref 55–135)
ALT SERPL W/O P-5'-P-CCNC: 18 U/L (ref 10–44)
AST SERPL-CCNC: 17 U/L (ref 10–40)
BASOPHILS # BLD AUTO: 0.02 K/UL (ref 0–0.2)
BASOPHILS NFR BLD: 0.4 % (ref 0–1.9)
BILIRUB DIRECT SERPL-MCNC: 0.1 MG/DL (ref 0.1–0.3)
BILIRUB SERPL-MCNC: 0.3 MG/DL (ref 0.1–1)
DIFFERENTIAL METHOD: ABNORMAL
EOSINOPHIL # BLD AUTO: 0 K/UL (ref 0–0.5)
EOSINOPHIL NFR BLD: 0.5 % (ref 0–8)
ERYTHROCYTE [DISTWIDTH] IN BLOOD BY AUTOMATED COUNT: 12.9 % (ref 11.5–14.5)
HCT VFR BLD AUTO: 39.5 % (ref 37–48.5)
HGB BLD-MCNC: 12.5 G/DL (ref 12–16)
IMM GRANULOCYTES # BLD AUTO: 0.05 K/UL (ref 0–0.04)
IMM GRANULOCYTES NFR BLD AUTO: 0.9 % (ref 0–0.5)
LYMPHOCYTES # BLD AUTO: 0.5 K/UL (ref 1–4.8)
LYMPHOCYTES NFR BLD: 8.6 % (ref 18–48)
MCH RBC QN AUTO: 30.9 PG (ref 27–31)
MCHC RBC AUTO-ENTMCNC: 31.6 G/DL (ref 32–36)
MCV RBC AUTO: 98 FL (ref 82–98)
MONOCYTES # BLD AUTO: 0.9 K/UL (ref 0.3–1)
MONOCYTES NFR BLD: 15.5 % (ref 4–15)
NEUTROPHILS # BLD AUTO: 4.1 K/UL (ref 1.8–7.7)
NEUTROPHILS NFR BLD: 74.1 % (ref 38–73)
NRBC BLD-RTO: 0 /100 WBC
PLATELET # BLD AUTO: 321 K/UL (ref 150–450)
PMV BLD AUTO: 10.2 FL (ref 9.2–12.9)
PROT SERPL-MCNC: 6.4 G/DL (ref 6–8.4)
RBC # BLD AUTO: 4.04 M/UL (ref 4–5.4)
WBC # BLD AUTO: 5.47 K/UL (ref 3.9–12.7)

## 2022-03-02 PROCEDURE — 80076 HEPATIC FUNCTION PANEL: CPT | Performed by: CLINICAL NURSE SPECIALIST

## 2022-03-02 PROCEDURE — 86769 SARS-COV-2 COVID-19 ANTIBODY: CPT | Performed by: CLINICAL NURSE SPECIALIST

## 2022-03-02 PROCEDURE — 36415 COLL VENOUS BLD VENIPUNCTURE: CPT | Performed by: CLINICAL NURSE SPECIALIST

## 2022-03-02 PROCEDURE — 85025 COMPLETE CBC W/AUTO DIFF WBC: CPT | Performed by: CLINICAL NURSE SPECIALIST

## 2022-03-02 PROCEDURE — 82306 VITAMIN D 25 HYDROXY: CPT | Performed by: CLINICAL NURSE SPECIALIST

## 2022-03-02 PROCEDURE — 30000890 MAYO MISCELLANEOUS TEST (REFLEX): Performed by: CLINICAL NURSE SPECIALIST

## 2022-03-04 ENCOUNTER — PATIENT MESSAGE (OUTPATIENT)
Dept: NEUROLOGY | Facility: CLINIC | Age: 42
End: 2022-03-04
Payer: COMMERCIAL

## 2022-03-04 LAB — MAYO MISCELLANEOUS RESULT (REF): NORMAL

## 2022-03-07 ENCOUNTER — PATIENT MESSAGE (OUTPATIENT)
Dept: NEUROLOGY | Facility: CLINIC | Age: 42
End: 2022-03-07
Payer: COMMERCIAL

## 2022-03-29 ENCOUNTER — OFFICE VISIT (OUTPATIENT)
Dept: NEUROLOGY | Facility: CLINIC | Age: 42
End: 2022-03-29
Payer: COMMERCIAL

## 2022-03-29 DIAGNOSIS — D84.9 IMMUNOCOMPROMISED: Primary | ICD-10-CM

## 2022-03-29 DIAGNOSIS — G35 MULTIPLE SCLEROSIS: ICD-10-CM

## 2022-03-29 DIAGNOSIS — Z71.89 COUNSELING REGARDING GOALS OF CARE: ICD-10-CM

## 2022-03-29 DIAGNOSIS — D84.9 IMMUNOSUPPRESSION: ICD-10-CM

## 2022-03-29 PROCEDURE — 1160F RVW MEDS BY RX/DR IN RCRD: CPT | Mod: CPTII,95,, | Performed by: PSYCHIATRY & NEUROLOGY

## 2022-03-29 PROCEDURE — 1160F PR REVIEW ALL MEDS BY PRESCRIBER/CLIN PHARMACIST DOCUMENTED: ICD-10-PCS | Mod: CPTII,95,, | Performed by: PSYCHIATRY & NEUROLOGY

## 2022-03-29 PROCEDURE — 99215 PR OFFICE/OUTPT VISIT, EST, LEVL V, 40-54 MIN: ICD-10-PCS | Mod: 95,,, | Performed by: PSYCHIATRY & NEUROLOGY

## 2022-03-29 PROCEDURE — 1159F MED LIST DOCD IN RCRD: CPT | Mod: CPTII,95,, | Performed by: PSYCHIATRY & NEUROLOGY

## 2022-03-29 PROCEDURE — 99215 OFFICE O/P EST HI 40 MIN: CPT | Mod: 95,,, | Performed by: PSYCHIATRY & NEUROLOGY

## 2022-03-29 PROCEDURE — 4010F ACE/ARB THERAPY RXD/TAKEN: CPT | Mod: CPTII,95,, | Performed by: PSYCHIATRY & NEUROLOGY

## 2022-03-29 PROCEDURE — 1159F PR MEDICATION LIST DOCUMENTED IN MEDICAL RECORD: ICD-10-PCS | Mod: CPTII,95,, | Performed by: PSYCHIATRY & NEUROLOGY

## 2022-03-29 PROCEDURE — 4010F PR ACE/ARB THEARPY RXD/TAKEN: ICD-10-PCS | Mod: CPTII,95,, | Performed by: PSYCHIATRY & NEUROLOGY

## 2022-03-29 NOTE — PROGRESS NOTES
Subjective:   The patient location is: home  The chief complaint leading to consultation is: MS    Visit type: audiovisual    Face to Face time with patient: 30  40 minutes of total time spent on the encounter, which includes face to face time and non-face to face time preparing to see the patient (eg, review of tests), Obtaining and/or reviewing separately obtained history, Documenting clinical information in the electronic or other health record, Independently interpreting results (not separately reported) and communicating results to the patient/family/caregiver, or Care coordination (not separately reported).     Each patient to whom he or she provides medical services by telemedicine is:  (1) informed of the relationship between the physician and patient and the respective role of any other health care provider with respect to management of the patient; and (2) notified that he or she may decline to receive medical services by telemedicine and may withdraw from such care at any time.        Patient ID: Camila Dawn is a 42 y.o. female who presents today for a fit in clinic visit for MS.      MS HPI:  · DMT: fingolimod-- 5 days / week   · Side effects from DMT?   · Taking vitamin D3 as recommended?    · Visit today scheduled to discuss a COVID care plan  · [facility].  She wears a mask  · vaxxed x 4 -- 4th dose was January.   · Open to Evusheld   · Recent COVID ab test showed she does have Ab   · Continues to be very cautious about socializing; has only eaten out once in past 2 years;  Ate outside.  · She is feeling stable from an MS perspective    SOCIAL HISTORY  Social History     Tobacco Use    Smoking status: Never Smoker    Smokeless tobacco: Never Used   Substance Use Topics    Alcohol use: Yes     Comment: rarely    Drug use: No       Living arrangements - the patient lives with their family.      REVIEW OF SYMPTOMS 3/24/2021   Do you feel abnormally tired on most days? Yes   Do  you feel you generally sleep well? Yes   Do you have difficulty controlling your bladder?  No   Do you have difficulty controlling your bowels?  No   Do you have frequent muscle cramps, tightness or spasms in your limbs?  No   Do you have new visual symptoms?  No   Do you have worsening difficulty with your memory or thinking? Yes   Do you have worsening symptoms of anxiety or depression?  Yes   For patients who walk, Do you have more difficulty walking?  No   Have you fallen since your last visit?  No   For patients who use wheelchairs: Do you have any skin wounds or breakdown? Not Applicable   Do you have difficulty using your hands?  No   Do you have shooting or burning pain? No   Do you have difficulty with sexual function?  No   If you are sexually active, are you using birth control? Y/N  N/A Yes   Do you often choke when swallowing liquids or solid food?  No   Do you experience worsening symptoms when overheated? Yes   Do you need any new equipment such as a wheelchair, walker or shower chair? No   Do you receive co-pay financial assistance for your principal MS medicine? Yes   Would you be interested in participating in an MS research trial in the future? No   For patients on Gilenya, Tecfidera, Aubagio, Rituxan, Ocrevus, Tysabri, Lemtrada or Methotrexate, are you aware that you should NOT receive live virus vaccines?  Yes   Do you feel you have adequate family/friend support?  Yes   Do you have health insurance?   Yes   Are you currently employed? No   Do you receive SSDI/SSI?  No   Do you use marijuana or cannabis products? No   How often? -   Have you been diagnosed with a urinary tract infection since your last visit here? No   Have you been diagnosed with a respiratory tract infection since your last visit here? No   Have you been to the emergency room since your last visit here? No   Have you been hospitalized since your last visit here?  No          Imaging:     Results for orders placed during the  hospital encounter of 02/24/22    MRI Brain Demyelinating Without Contrast    Impression  Brain appears stable from prior exam.  No new discrete lesions to indicate ongoing demyelination.    Electronically signed by resident: Ash Goff  Date:    02/24/2022  Time:    11:52    Electronically signed by: Joe Kemp MD  Date:    02/24/2022  Time:    14:41    No results found for this or any previous visit.    No results found for this or any previous visit.    Results for orders placed during the hospital encounter of 12/10/18    MRI Brain Demyelinating W W/O Contrast    Impression  There has been no significant change from the prior examination of 09/07/2018 with continued scattered foci of T2/FLAIR hyperintensity within the supratentorial demyelinating plaque burden.  Please note there is continued diffusion hyperintensity associated with several of the lesions as well as some T1 hypointense lesions.  No definite new lesion or enhancement to suggest active demyelination.      Electronically signed by: Laura Johansen MD  Date:    12/10/2018  Time:    12:23    No results found for this or any previous visit.    No results found for this or any previous visit.        Labs:     Lab Results   Component Value Date    VEHRYTCQ55PS 61 03/02/2022    NSDMTEAC06SD 88 10/19/2020    FVRVBIAG81YV 111 (H) 09/17/2020     Lab Results   Component Value Date    JCVINDEX 0.20 (A) 09/17/2019    JCVANTIBODY Indeterminate (A) 09/17/2019     No results found for: TA5VKTUR, ABSOLUTECD3, ZY8HTEHA, ABSOLUTECD8, CA4BYPQS, ABSOLUTECD4, LABCD48  Lab Results   Component Value Date    WBC 5.47 03/02/2022    RBC 4.04 03/02/2022    HGB 12.5 03/02/2022    HCT 39.5 03/02/2022    MCV 98 03/02/2022    MCH 30.9 03/02/2022    MCHC 31.6 (L) 03/02/2022    RDW 12.9 03/02/2022     03/02/2022    MPV 10.2 03/02/2022    GRAN 4.1 03/02/2022    GRAN 74.1 (H) 03/02/2022    LYMPH 0.5 (L) 03/02/2022    LYMPH 8.6 (L) 03/02/2022    MONO 0.9 03/02/2022    MONO  15.5 (H) 03/02/2022    EOS 0.0 03/02/2022    BASO 0.02 03/02/2022    EOSINOPHIL 0.5 03/02/2022    BASOPHIL 0.4 03/02/2022     Sodium   Date Value Ref Range Status   03/26/2021 139 136 - 145 mmol/L Final     Potassium   Date Value Ref Range Status   03/26/2021 4.7 3.5 - 5.1 mmol/L Final     Chloride   Date Value Ref Range Status   03/26/2021 104 95 - 110 mmol/L Final     CO2   Date Value Ref Range Status   03/26/2021 26 23 - 29 mmol/L Final     Glucose   Date Value Ref Range Status   03/26/2021 84 70 - 110 mg/dL Final     BUN   Date Value Ref Range Status   03/26/2021 11 6 - 20 mg/dL Final     Creatinine   Date Value Ref Range Status   03/26/2021 0.7 0.5 - 1.4 mg/dL Final     Calcium   Date Value Ref Range Status   03/26/2021 9.3 8.7 - 10.5 mg/dL Final     Total Protein   Date Value Ref Range Status   03/02/2022 6.4 6.0 - 8.4 g/dL Final     Albumin   Date Value Ref Range Status   03/02/2022 3.2 (L) 3.5 - 5.2 g/dL Final     Total Bilirubin   Date Value Ref Range Status   03/02/2022 0.3 0.1 - 1.0 mg/dL Final     Comment:     For infants and newborns, interpretation of results should be based  on gestational age, weight and in agreement with clinical  observations.    Premature Infant recommended reference ranges:  Up to 24 hours.............<8.0 mg/dL  Up to 48 hours............<12.0 mg/dL  3-5 days..................<15.0 mg/dL  6-29 days.................<15.0 mg/dL       Alkaline Phosphatase   Date Value Ref Range Status   03/02/2022 54 (L) 55 - 135 U/L Final     AST   Date Value Ref Range Status   03/02/2022 17 10 - 40 U/L Final     ALT   Date Value Ref Range Status   03/02/2022 18 10 - 44 U/L Final     Anion Gap   Date Value Ref Range Status   03/26/2021 9 8 - 16 mmol/L Final     eGFR if    Date Value Ref Range Status   03/26/2021 >60.0 >60 mL/min/1.73 m^2 Final     eGFR if non    Date Value Ref Range Status   03/26/2021 >60.0 >60 mL/min/1.73 m^2 Final     Comment:     Calculation  used to obtain the estimated glomerular filtration  rate (eGFR) is the CKD-EPI equation.        No results found for: HEPBSAG, HEPBSAB, HEPBCAB        MS Impression and Plan:     NEURO MULTIPLE SCLEROSIS IMPRESSION:   MS Status:     Number of relapses in the past year?:  0    Clinical Progression:  Clinically Stable    MRI Progression:  Stable  Plan:     DMT:  No change in management    Symptom Management:  No change in symptom management     Discussed COVID at length;   Recommend Edel; order placed; she is vaxxed x 4;   Given these intervention, plus availability of home testing, outpatient oral treatment and mAb IV therapy, we discussed the fact that COVID is much more manageable than it was before.  I did encourage her to socialize more, and to use common sense.   Will see her in person in May           Problem List Items Addressed This Visit    None     Visit Diagnoses     Immunocompromised    -  Primary    Relevant Orders    Ambulatory referral/consult Order for Edel    Multiple sclerosis        Immunosuppression        Counseling regarding goals of care              Jazmine Lundy MD

## 2022-03-31 ENCOUNTER — INFUSION (OUTPATIENT)
Dept: INFUSION THERAPY | Facility: HOSPITAL | Age: 42
End: 2022-03-31
Payer: COMMERCIAL

## 2022-03-31 VITALS
SYSTOLIC BLOOD PRESSURE: 114 MMHG | RESPIRATION RATE: 16 BRPM | DIASTOLIC BLOOD PRESSURE: 65 MMHG | TEMPERATURE: 99 F | OXYGEN SATURATION: 99 % | HEART RATE: 73 BPM

## 2022-03-31 DIAGNOSIS — D84.821 IMMUNOSUPPRESSION DUE TO DRUG THERAPY: ICD-10-CM

## 2022-03-31 DIAGNOSIS — D84.9 IMMUNOCOMPROMISED: Primary | ICD-10-CM

## 2022-03-31 DIAGNOSIS — Z79.899 IMMUNOSUPPRESSION DUE TO DRUG THERAPY: ICD-10-CM

## 2022-03-31 PROCEDURE — 63600175 PHARM REV CODE 636 W HCPCS: Performed by: INTERNAL MEDICINE

## 2022-03-31 PROCEDURE — M0220 HC INJECTION ADMIN, TIXAGEVIMAB-CILGAVIMAB, INCL POST ADMIN MONIT: HCPCS | Performed by: INTERNAL MEDICINE

## 2022-03-31 RX ORDER — PREDNISONE 20 MG/1
40 TABLET ORAL ONCE AS NEEDED
Status: CANCELLED | OUTPATIENT
Start: 2022-03-31

## 2022-03-31 RX ORDER — ALBUTEROL SULFATE 90 UG/1
2 AEROSOL, METERED RESPIRATORY (INHALATION) ONCE AS NEEDED
Status: DISCONTINUED | OUTPATIENT
Start: 2022-03-31 | End: 2022-03-31 | Stop reason: HOSPADM

## 2022-03-31 RX ORDER — EPINEPHRINE 0.3 MG/.3ML
0.3 INJECTION SUBCUTANEOUS
Status: DISCONTINUED | OUTPATIENT
Start: 2022-03-31 | End: 2022-03-31 | Stop reason: HOSPADM

## 2022-03-31 RX ORDER — DIPHENHYDRAMINE HCL 25 MG
25 CAPSULE ORAL ONCE AS NEEDED
Status: DISCONTINUED | OUTPATIENT
Start: 2022-03-31 | End: 2022-03-31 | Stop reason: HOSPADM

## 2022-03-31 RX ORDER — ACETAMINOPHEN 325 MG/1
650 TABLET ORAL ONCE AS NEEDED
Status: CANCELLED | OUTPATIENT
Start: 2022-03-31

## 2022-03-31 RX ORDER — DIPHENHYDRAMINE HCL 25 MG
25 CAPSULE ORAL ONCE AS NEEDED
Status: CANCELLED | OUTPATIENT
Start: 2022-03-31

## 2022-03-31 RX ORDER — ALBUTEROL SULFATE 90 UG/1
2 AEROSOL, METERED RESPIRATORY (INHALATION) ONCE AS NEEDED
Status: CANCELLED | OUTPATIENT
Start: 2022-03-31

## 2022-03-31 RX ORDER — ONDANSETRON 4 MG/1
4 TABLET, ORALLY DISINTEGRATING ORAL ONCE AS NEEDED
Status: CANCELLED | OUTPATIENT
Start: 2022-03-31

## 2022-03-31 RX ORDER — EPINEPHRINE 0.3 MG/.3ML
0.3 INJECTION SUBCUTANEOUS
Status: CANCELLED | OUTPATIENT
Start: 2022-03-31

## 2022-03-31 RX ORDER — PREDNISONE 20 MG/1
40 TABLET ORAL ONCE AS NEEDED
Status: DISCONTINUED | OUTPATIENT
Start: 2022-03-31 | End: 2022-03-31 | Stop reason: HOSPADM

## 2022-03-31 RX ORDER — ACETAMINOPHEN 325 MG/1
650 TABLET ORAL ONCE AS NEEDED
Status: DISCONTINUED | OUTPATIENT
Start: 2022-03-31 | End: 2022-03-31 | Stop reason: HOSPADM

## 2022-03-31 RX ORDER — ONDANSETRON 4 MG/1
4 TABLET, ORALLY DISINTEGRATING ORAL ONCE AS NEEDED
Status: DISCONTINUED | OUTPATIENT
Start: 2022-03-31 | End: 2022-03-31 | Stop reason: HOSPADM

## 2022-03-31 RX ADMIN — Medication 300 MG: at 02:03

## 2022-04-01 ENCOUNTER — TELEPHONE (OUTPATIENT)
Dept: NEUROLOGY | Facility: CLINIC | Age: 42
End: 2022-04-01

## 2022-04-01 NOTE — TELEPHONE ENCOUNTER
----- Message from Jazmine Lundy MD sent at 3/29/2022 10:18 AM CDT -----  Please send her the fact sheet for [employer]

## 2022-05-18 ENCOUNTER — PATIENT MESSAGE (OUTPATIENT)
Dept: NEUROLOGY | Facility: CLINIC | Age: 42
End: 2022-05-18
Payer: COMMERCIAL

## 2022-05-20 ENCOUNTER — TELEPHONE (OUTPATIENT)
Dept: NEUROLOGY | Facility: CLINIC | Age: 42
End: 2022-05-20
Payer: COMMERCIAL

## 2022-05-23 ENCOUNTER — TELEPHONE (OUTPATIENT)
Dept: PSYCHIATRY | Facility: CLINIC | Age: 42
End: 2022-05-23
Payer: COMMERCIAL

## 2022-05-25 ENCOUNTER — OFFICE VISIT (OUTPATIENT)
Dept: NEUROLOGY | Facility: CLINIC | Age: 42
End: 2022-05-25
Payer: COMMERCIAL

## 2022-05-25 VITALS
SYSTOLIC BLOOD PRESSURE: 123 MMHG | DIASTOLIC BLOOD PRESSURE: 80 MMHG | WEIGHT: 164.38 LBS | BODY MASS INDEX: 26.42 KG/M2 | HEART RATE: 87 BPM | HEIGHT: 66 IN

## 2022-05-25 DIAGNOSIS — Z79.899 ENCOUNTER FOR LONG-TERM (CURRENT) USE OF HIGH-RISK MEDICATION: ICD-10-CM

## 2022-05-25 DIAGNOSIS — D84.9 IMMUNOCOMPROMISED: ICD-10-CM

## 2022-05-25 DIAGNOSIS — Z71.89 COUNSELING REGARDING GOALS OF CARE: ICD-10-CM

## 2022-05-25 DIAGNOSIS — D84.9 IMMUNOSUPPRESSION: ICD-10-CM

## 2022-05-25 DIAGNOSIS — G35 MULTIPLE SCLEROSIS: Primary | ICD-10-CM

## 2022-05-25 PROCEDURE — 3008F PR BODY MASS INDEX (BMI) DOCUMENTED: ICD-10-PCS | Mod: CPTII,S$GLB,, | Performed by: PSYCHIATRY & NEUROLOGY

## 2022-05-25 PROCEDURE — 99215 PR OFFICE/OUTPT VISIT, EST, LEVL V, 40-54 MIN: ICD-10-PCS | Mod: S$GLB,,, | Performed by: PSYCHIATRY & NEUROLOGY

## 2022-05-25 PROCEDURE — 1159F PR MEDICATION LIST DOCUMENTED IN MEDICAL RECORD: ICD-10-PCS | Mod: CPTII,S$GLB,, | Performed by: PSYCHIATRY & NEUROLOGY

## 2022-05-25 PROCEDURE — 99999 PR PBB SHADOW E&M-EST. PATIENT-LVL IV: ICD-10-PCS | Mod: PBBFAC,,, | Performed by: PSYCHIATRY & NEUROLOGY

## 2022-05-25 PROCEDURE — 1160F PR REVIEW ALL MEDS BY PRESCRIBER/CLIN PHARMACIST DOCUMENTED: ICD-10-PCS | Mod: CPTII,S$GLB,, | Performed by: PSYCHIATRY & NEUROLOGY

## 2022-05-25 PROCEDURE — 1159F MED LIST DOCD IN RCRD: CPT | Mod: CPTII,S$GLB,, | Performed by: PSYCHIATRY & NEUROLOGY

## 2022-05-25 PROCEDURE — 3074F PR MOST RECENT SYSTOLIC BLOOD PRESSURE < 130 MM HG: ICD-10-PCS | Mod: CPTII,S$GLB,, | Performed by: PSYCHIATRY & NEUROLOGY

## 2022-05-25 PROCEDURE — 3008F BODY MASS INDEX DOCD: CPT | Mod: CPTII,S$GLB,, | Performed by: PSYCHIATRY & NEUROLOGY

## 2022-05-25 PROCEDURE — 3074F SYST BP LT 130 MM HG: CPT | Mod: CPTII,S$GLB,, | Performed by: PSYCHIATRY & NEUROLOGY

## 2022-05-25 PROCEDURE — 4010F ACE/ARB THERAPY RXD/TAKEN: CPT | Mod: CPTII,S$GLB,, | Performed by: PSYCHIATRY & NEUROLOGY

## 2022-05-25 PROCEDURE — 4010F PR ACE/ARB THEARPY RXD/TAKEN: ICD-10-PCS | Mod: CPTII,S$GLB,, | Performed by: PSYCHIATRY & NEUROLOGY

## 2022-05-25 PROCEDURE — 99999 PR PBB SHADOW E&M-EST. PATIENT-LVL IV: CPT | Mod: PBBFAC,,, | Performed by: PSYCHIATRY & NEUROLOGY

## 2022-05-25 PROCEDURE — 99215 OFFICE O/P EST HI 40 MIN: CPT | Mod: S$GLB,,, | Performed by: PSYCHIATRY & NEUROLOGY

## 2022-05-25 PROCEDURE — 3079F PR MOST RECENT DIASTOLIC BLOOD PRESSURE 80-89 MM HG: ICD-10-PCS | Mod: CPTII,S$GLB,, | Performed by: PSYCHIATRY & NEUROLOGY

## 2022-05-25 PROCEDURE — 1160F RVW MEDS BY RX/DR IN RCRD: CPT | Mod: CPTII,S$GLB,, | Performed by: PSYCHIATRY & NEUROLOGY

## 2022-05-25 PROCEDURE — 3079F DIAST BP 80-89 MM HG: CPT | Mod: CPTII,S$GLB,, | Performed by: PSYCHIATRY & NEUROLOGY

## 2022-05-25 NOTE — PROGRESS NOTES
"Subjective:          Patient ID: Camila Dawn is a 42 y.o. female who presents today for a routine clinic visit for MS.      MS HPI:  · DMT: fingolimod  5 days / week   · Side effects from DMT? No -   · Taking vitamin D3 as recommended?  5,000 IU / day  · Recently returned from a road trip to [facility]--went to 3 [employer] [facility].  · Feeling stable from MS perspective  · S/p Evusheld   · Xywav her narcollepsy drug has been "a life changer"    Medications:  Current Outpatient Medications   Medication Sig    alpha lipoic acid 100 mg Cap Take by mouth.    ascorbic acid, vitamin C, (VITAMIN C) 500 MG tablet Take by mouth.    azelastine (ASTELIN) 137 mcg (0.1 %) nasal spray 2 sprays 2 (two) times daily.    b complex vitamins tablet Take 1 tablet by mouth once daily.    cinnamon bark (CINNAMON ORAL) Take by mouth.    fexofenadine (ALLEGRA) 180 MG tablet Take 180 mg by mouth once daily.    FLAXSEED ORAL Take by mouth.    fluticasone (FLONASE) 50 mcg/actuation nasal spray 1 spray by Each Nare route once daily.    GILENYA 0.5 mg Cap TAKE 1 CAPSULE BY MOUTH  ONCE DAILY MONDAY THROUGH  FRIDAY    lisinopril (PRINIVIL,ZESTRIL) 40 MG tablet Take 40 mg by mouth once daily.     loratadine (CLARITIN) 10 mg tablet loratadine 10 mg tablet   Take 1 tablet every day by oral route as needed.    metformin (GLUCOPHAGE) 1000 MG tablet TK 1 T PO  BID    modafiniL (PROVIGIL) 200 MG Tab Take half tablet (100mg) in AM and half tablet (100mg) at noon.    multivitamin (ONE DAILY MULTIVITAMIN) per tablet Take 1 tablet by mouth once daily.    norgestimate-ethinyl estradiol (ORTHO TRI-CYCLEN,TRI-SPRINTEC) 0.18/0.215/0.25 mg-35 mcg (28) tablet Take 1 tablet by mouth once daily.    sodium,calcium,mag,pot oxybate (XYWAV) 0.5 gram/mL Soln Take 4.5 g by mouth every evening. Takes 4.5 g PO twice nightly.    triamcinolone acetonide 0.1% (KENALOG) 0.1 % cream Apply topically continuous prn.     vitamin D 1000 units Tab Take " 4,000 Units by mouth once daily.      SOCIAL HISTORY  Social History     Tobacco Use    Smoking status: Never Smoker    Smokeless tobacco: Never Used   Substance Use Topics    Alcohol use: Yes     Comment: rarely    Drug use: No       Living arrangements - the patient lives with their spouse.      REVIEW OF SYMPTOMS 5/23/2022   Do you feel abnormally tired on most days? No   Do you feel you generally sleep well? Yes   Do you have difficulty controlling your bladder?  No   Do you have difficulty controlling your bowels?  No   Do you have frequent muscle cramps, tightness or spasms in your limbs?  No   Do you have new visual symptoms?  No   Do you have worsening difficulty with your memory or thinking? No   Do you have worsening symptoms of anxiety or depression?  No   For patients who walk, Do you have more difficulty walking?  No   Have you fallen since your last visit?  No   For patients who use wheelchairs: Do you have any skin wounds or breakdown? Not Applicable   Do you have difficulty using your hands?  No   Do you have shooting or burning pain? No   Do you have difficulty with sexual function?  No   If you are sexually active, are you using birth control? Y/N  N/A Yes   Do you often choke when swallowing liquids or solid food?  No   Do you experience worsening symptoms when overheated? Yes   Do you need any new equipment such as a wheelchair, walker or shower chair? No   Do you receive co-pay financial assistance for your principal MS medicine? Yes   Would you be interested in participating in an MS research trial in the future? No   For patients on Gilenya, Tecfidera, Aubagio, Rituxan, Ocrevus, Tysabri, Lemtrada or Methotrexate, are you aware that you should NOT receive live virus vaccines?  Yes   Do you feel you have adequate family/friend support?  Yes   Do you have health insurance?   Yes   Are you currently employed? No   Do you receive SSDI/SSI?  No   Do you use marijuana or cannabis products? No    How often? -   Have you been diagnosed with a urinary tract infection since your last visit here? No   Have you been diagnosed with a respiratory tract infection since your last visit here? No   Have you been to the emergency room since your last visit here? No   Have you been hospitalized since your last visit here?  No            Objective:        Timed 25 Foot Walk: 3/26/2021 5/25/2022   Did patient wear an AFO? No No   Was assistive device used? No No   Time for 25 Foot Walk (seconds) 3.8 3.4   Time for 25 Foot Walk (seconds) 4 -     Neurologic Exam  MENTAL STATUS: grossly intact  CRANIAL NERVE EXAM: There is no internuclear ophthalmoplegia. Extraocular   muscles are intact.  No facial   asymmetry.There is no dysarthria.   MOTOR EXAM: Normal bulk and tone throughout UE and LE bilaterally. Rapid sequential movements are normal. Strength is 5/5 in all groups   in the lower extremities and upper extremities.   REFLEXES: Symmetric and 2+ throughout in all 4 extremities.   SENSORY EXAM: Normal to vibration t/o  COORDINATION: Normal finger-to-nose exam.   GAIT: Narrow based and stable.      Imaging:     Results for orders placed during the hospital encounter of 02/24/22    MRI Brain Demyelinating Without Contrast    Impression  Brain appears stable from prior exam.  No new discrete lesions to indicate ongoing demyelination.    Electronically signed by resident: Ash Goff  Date:    02/24/2022  Time:    11:52    Electronically signed by: Joe Kemp MD  Date:    02/24/2022  Time:    14:41      Results for orders placed during the hospital encounter of 12/10/18    MRI Brain Demyelinating W W/O Contrast    Impression  There has been no significant change from the prior examination of 09/07/2018 with continued scattered foci of T2/FLAIR hyperintensity within the supratentorial demyelinating plaque burden.  Please note there is continued diffusion hyperintensity associated with several of the lesions as well as some  T1 hypointense lesions.  No definite new lesion or enhancement to suggest active demyelination.      Electronically signed by: Laura Johansen MD  Date:    12/10/2018  Time:    12:23    No results found for this or any previous visit.    No results found for this or any previous visit.        Labs:     Lab Results   Component Value Date    MKUTREYQ78ZP 61 03/02/2022    OPLQCUGT68EO 88 10/19/2020    ZDSAMNWE70BT 111 (H) 09/17/2020     Lab Results   Component Value Date    JCVINDEX 0.20 (A) 09/17/2019    JCVANTIBODY Indeterminate (A) 09/17/2019     No results found for: HU8TTSOK, ABSOLUTECD3, VC3GOALA, ABSOLUTECD8, BV0JHSAE, ABSOLUTECD4, LABCD48  Lab Results   Component Value Date    WBC 5.47 03/02/2022    RBC 4.04 03/02/2022    HGB 12.5 03/02/2022    HCT 39.5 03/02/2022    MCV 98 03/02/2022    MCH 30.9 03/02/2022    MCHC 31.6 (L) 03/02/2022    RDW 12.9 03/02/2022     03/02/2022    MPV 10.2 03/02/2022    GRAN 4.1 03/02/2022    GRAN 74.1 (H) 03/02/2022    LYMPH 0.5 (L) 03/02/2022    LYMPH 8.6 (L) 03/02/2022    MONO 0.9 03/02/2022    MONO 15.5 (H) 03/02/2022    EOS 0.0 03/02/2022    BASO 0.02 03/02/2022    EOSINOPHIL 0.5 03/02/2022    BASOPHIL 0.4 03/02/2022     Sodium   Date Value Ref Range Status   03/26/2021 139 136 - 145 mmol/L Final     Potassium   Date Value Ref Range Status   03/26/2021 4.7 3.5 - 5.1 mmol/L Final     Chloride   Date Value Ref Range Status   03/26/2021 104 95 - 110 mmol/L Final     CO2   Date Value Ref Range Status   03/26/2021 26 23 - 29 mmol/L Final     Glucose   Date Value Ref Range Status   03/26/2021 84 70 - 110 mg/dL Final     BUN   Date Value Ref Range Status   03/26/2021 11 6 - 20 mg/dL Final     Creatinine   Date Value Ref Range Status   03/26/2021 0.7 0.5 - 1.4 mg/dL Final     Calcium   Date Value Ref Range Status   03/26/2021 9.3 8.7 - 10.5 mg/dL Final     Total Protein   Date Value Ref Range Status   03/02/2022 6.4 6.0 - 8.4 g/dL Final     Albumin   Date Value Ref Range Status    03/02/2022 3.2 (L) 3.5 - 5.2 g/dL Final     Total Bilirubin   Date Value Ref Range Status   03/02/2022 0.3 0.1 - 1.0 mg/dL Final     Comment:     For infants and newborns, interpretation of results should be based  on gestational age, weight and in agreement with clinical  observations.    Premature Infant recommended reference ranges:  Up to 24 hours.............<8.0 mg/dL  Up to 48 hours............<12.0 mg/dL  3-5 days..................<15.0 mg/dL  6-29 days.................<15.0 mg/dL       Alkaline Phosphatase   Date Value Ref Range Status   03/02/2022 54 (L) 55 - 135 U/L Final     AST   Date Value Ref Range Status   03/02/2022 17 10 - 40 U/L Final     ALT   Date Value Ref Range Status   03/02/2022 18 10 - 44 U/L Final     Anion Gap   Date Value Ref Range Status   03/26/2021 9 8 - 16 mmol/L Final     eGFR if    Date Value Ref Range Status   03/26/2021 >60.0 >60 mL/min/1.73 m^2 Final     eGFR if non    Date Value Ref Range Status   03/26/2021 >60.0 >60 mL/min/1.73 m^2 Final     Comment:     Calculation used to obtain the estimated glomerular filtration  rate (eGFR) is the CKD-EPI equation.        No results found for: HEPBSAG, HEPBSAB, HEPBCAB        MS Impression and Plan:     NEURO MULTIPLE SCLEROSIS IMPRESSION:   MS Status:     Number of relapses in the past year?:  0    Clinical Progression:  Clinically Stable    MRI Progression:  Stable  Plan:     DMT:  No change in management     Next Imaging Due: 2/25/2023     Next Labs Due: 9/25/2022     KARINA  F/u 6 mo Sue Arellano [facility]               Problem List Items Addressed This Visit    None     Visit Diagnoses     Multiple sclerosis    -  Primary    Relevant Orders    CBC Auto Differential    Hepatic Function Panel    Counseling regarding goals of care        Immunosuppression        Immunocompromised        Encounter for long-term (current) use of high-risk medication              Jazmine Lundy MD    I spent a total of 40  minutes on the day of the visit.This includes face to face time and non-face to face time preparing to see the patient (eg, review of tests), obtaining and/or reviewing separately obtained history, documenting clinical information in the electronic or other health record, independently interpreting results and communicating results to the patient/family/caregiver, or care coordinator.

## 2022-06-21 ENCOUNTER — PATIENT MESSAGE (OUTPATIENT)
Dept: NEUROLOGY | Facility: CLINIC | Age: 42
End: 2022-06-21
Payer: COMMERCIAL

## 2022-06-24 ENCOUNTER — PATIENT MESSAGE (OUTPATIENT)
Dept: PSYCHIATRY | Facility: CLINIC | Age: 42
End: 2022-06-24
Payer: COMMERCIAL

## 2022-07-18 ENCOUNTER — PATIENT MESSAGE (OUTPATIENT)
Dept: NEUROLOGY | Facility: CLINIC | Age: 42
End: 2022-07-18
Payer: COMMERCIAL

## 2022-08-02 ENCOUNTER — OFFICE VISIT (OUTPATIENT)
Dept: NEUROLOGY | Facility: CLINIC | Age: 42
End: 2022-08-02
Payer: COMMERCIAL

## 2022-08-02 ENCOUNTER — PATIENT MESSAGE (OUTPATIENT)
Dept: NEUROLOGY | Facility: CLINIC | Age: 42
End: 2022-08-02

## 2022-08-02 DIAGNOSIS — G35 MULTIPLE SCLEROSIS: Primary | ICD-10-CM

## 2022-08-02 DIAGNOSIS — Z71.89 COUNSELING REGARDING GOALS OF CARE: ICD-10-CM

## 2022-08-02 DIAGNOSIS — Z79.899 HIGH RISK MEDICATION USE: ICD-10-CM

## 2022-08-02 DIAGNOSIS — Z29.89 PROPHYLACTIC IMMUNOTHERAPY: ICD-10-CM

## 2022-08-02 PROCEDURE — 99214 OFFICE O/P EST MOD 30 MIN: CPT | Mod: 95,,, | Performed by: CLINICAL NURSE SPECIALIST

## 2022-08-02 PROCEDURE — 4010F PR ACE/ARB THEARPY RXD/TAKEN: ICD-10-PCS | Mod: CPTII,95,, | Performed by: CLINICAL NURSE SPECIALIST

## 2022-08-02 PROCEDURE — 4010F ACE/ARB THERAPY RXD/TAKEN: CPT | Mod: CPTII,95,, | Performed by: CLINICAL NURSE SPECIALIST

## 2022-08-02 PROCEDURE — 99214 PR OFFICE/OUTPT VISIT, EST, LEVL IV, 30-39 MIN: ICD-10-PCS | Mod: 95,,, | Performed by: CLINICAL NURSE SPECIALIST

## 2022-08-02 NOTE — Clinical Note
FYI, she does not want to switch from Gilenya at the moment. She is not currently in a plantar wart flare. She has A LOT of anxiety about Ocrevus in the setting of COVID. She prefers to just watch and wait for now.

## 2022-08-02 NOTE — PROGRESS NOTES
Subjective:          Patient ID: Camila Dawn is a 42 y.o. female who presents today for a fit-in virtual visit for MS.  She was last seen in May by Dr. Lundy. The history has been provided by the patient.     The patient location is: her home   The chief complaint leading to consultation is: MS     Visit type: audiovisual    Face to Face time with patient: 30 minutes   35 minutes of total time spent on the encounter, which includes face to face time and non-face to face time preparing to see the patient (eg, review of tests), Obtaining and/or reviewing separately obtained history, Documenting clinical information in the electronic or other health record, Independently interpreting results (not separately reported) and communicating results to the patient/family/caregiver, or Care coordination (not separately reported).       Each patient to whom he or she provides medical services by telemedicine is:  (1) informed of the relationship between the physician and patient and the respective role of any other health care provider with respect to management of the patient; and (2) notified that he or she may decline to receive medical services by telemedicine and may withdraw from such care at any time.      MS HPI:  · DMT: fingolimod  · Side effects from DMT? No  · Taking vitamin D3 as recommended? Yes -  Dose: 4000 units daily  · She continues to experience plantar warts on left foot and left hand. There is some concern that Gilenya may be contributing to these.  She has been following up with podiatry and dermatology. Her dermatologist is not sure if switching medications from Gilenya will make any difference. She has had these warts for the past 5-6 years (started on Gilenya nearly 7 years ago), and they have occurred generally every 2-3 months, but sometimes with up to 6 months in between flares.     Medications:  Current Outpatient Medications   Medication Sig    alpha lipoic acid 100 mg Cap Take by  mouth.    ascorbic acid, vitamin C, (VITAMIN C) 500 MG tablet Take by mouth.    azelastine (ASTELIN) 137 mcg (0.1 %) nasal spray 2 sprays 2 (two) times daily.    b complex vitamins tablet Take 1 tablet by mouth once daily.    cinnamon bark (CINNAMON ORAL) Take by mouth.    fexofenadine (ALLEGRA) 180 MG tablet Take 180 mg by mouth once daily.    FLAXSEED ORAL Take by mouth.    fluticasone (FLONASE) 50 mcg/actuation nasal spray 1 spray by Each Nare route once daily.    GILENYA 0.5 mg Cap TAKE 1 CAPSULE BY MOUTH  ONCE DAILY MONDAY THROUGH  FRIDAY    lisinopril (PRINIVIL,ZESTRIL) 40 MG tablet Take 40 mg by mouth once daily.     loratadine (CLARITIN) 10 mg tablet loratadine 10 mg tablet   Take 1 tablet every day by oral route as needed.    metformin (GLUCOPHAGE) 1000 MG tablet TK 1 T PO  BID    modafiniL (PROVIGIL) 200 MG Tab Take half tablet (100mg) in AM and half tablet (100mg) at noon.    multivitamin (ONE DAILY MULTIVITAMIN) per tablet Take 1 tablet by mouth once daily.    norgestimate-ethinyl estradiol (ORTHO TRI-CYCLEN,TRI-SPRINTEC) 0.18/0.215/0.25 mg-35 mcg (28) tablet Take 1 tablet by mouth once daily.    sodium,calcium,mag,pot oxybate (XYWAV) 0.5 gram/mL Soln Take 4.5 g by mouth every evening. Takes 4.5 g PO twice nightly.    triamcinolone acetonide 0.1% (KENALOG) 0.1 % cream Apply topically continuous prn.     vitamin D 1000 units Tab Take 4,000 Units by mouth once daily.        SOCIAL HISTORY  Social History     Tobacco Use    Smoking status: Never Smoker    Smokeless tobacco: Never Used   Substance Use Topics    Alcohol use: Yes     Comment: rarely    Drug use: No       Living arrangements - the patient lives with their spouse.           Objective:        1. 25 foot timed walk:  Timed 25 Foot Walk: 3/26/2021 5/25/2022   Did patient wear an AFO? No No   Was assistive device used? No No   Time for 25 Foot Walk (seconds) 3.8 3.4   Time for 25 Foot Walk (seconds) 4 -       Neurologic  Exam    Deferred   Imaging:     Results for orders placed during the hospital encounter of 02/24/22    MRI Brain Demyelinating Without Contrast    Impression  Brain appears stable from prior exam.  No new discrete lesions to indicate ongoing demyelination.    Electronically signed by resident: Ash Goff  Date:    02/24/2022  Time:    11:52    Electronically signed by: Joe Kemp MD  Date:    02/24/2022  Time:    14:41      Results for orders placed during the hospital encounter of 12/10/18    MRI Brain Demyelinating W W/O Contrast    Impression  There has been no significant change from the prior examination of 09/07/2018 with continued scattered foci of T2/FLAIR hyperintensity within the supratentorial demyelinating plaque burden.  Please note there is continued diffusion hyperintensity associated with several of the lesions as well as some T1 hypointense lesions.  No definite new lesion or enhancement to suggest active demyelination.      Electronically signed by: Laura Johansen MD  Date:    12/10/2018  Time:    12:23        Labs:     Lab Results   Component Value Date    [id number] 61 03/02/2022    [id number] 88 10/19/2020    [id number] 111 (H) 09/17/2020     Lab Results   Component Value Date    JCVINDEX 0.20 (A) 09/17/2019    JCVANTIBODY Indeterminate (A) 09/17/2019     Lab Results   Component Value Date    WBC 5.47 03/02/2022    RBC 4.04 03/02/2022    HGB 12.5 03/02/2022    HCT 39.5 03/02/2022    MCV 98 03/02/2022    MCH 30.9 03/02/2022    MCHC 31.6 (L) 03/02/2022    RDW 12.9 03/02/2022     03/02/2022    MPV 10.2 03/02/2022    GRAN 4.1 03/02/2022    GRAN 74.1 (H) 03/02/2022    LYMPH 0.5 (L) 03/02/2022    LYMPH 8.6 (L) 03/02/2022    MONO 0.9 03/02/2022    MONO 15.5 (H) 03/02/2022    EOS 0.0 03/02/2022    BASO 0.02 03/02/2022    EOSINOPHIL 0.5 03/02/2022    BASOPHIL 0.4 03/02/2022     Sodium   Date Value Ref Range Status   03/26/2021 139 136 - 145 mmol/L Final     Potassium   Date Value Ref  Range Status   03/26/2021 4.7 3.5 - 5.1 mmol/L Final     Chloride   Date Value Ref Range Status   03/26/2021 104 95 - 110 mmol/L Final     CO2   Date Value Ref Range Status   03/26/2021 26 23 - 29 mmol/L Final     Glucose   Date Value Ref Range Status   03/26/2021 84 70 - 110 mg/dL Final     BUN   Date Value Ref Range Status   03/26/2021 11 6 - 20 mg/dL Final     Creatinine   Date Value Ref Range Status   03/26/2021 0.7 0.5 - 1.4 mg/dL Final     Calcium   Date Value Ref Range Status   03/26/2021 9.3 8.7 - 10.5 mg/dL Final     Total Protein   Date Value Ref Range Status   03/02/2022 6.4 6.0 - 8.4 g/dL Final     Albumin   Date Value Ref Range Status   03/02/2022 3.2 (L) 3.5 - 5.2 g/dL Final     Total Bilirubin   Date Value Ref Range Status   03/02/2022 0.3 0.1 - 1.0 mg/dL Final     Comment:     For infants and newborns, interpretation of results should be based  on gestational age, weight and in agreement with clinical  observations.    Premature Infant recommended reference ranges:  Up to 24 hours.............<8.0 mg/dL  Up to 48 hours............<12.0 mg/dL  3-5 days..................<15.0 mg/dL  6-29 days.................<15.0 mg/dL       Alkaline Phosphatase   Date Value Ref Range Status   03/02/2022 54 (L) 55 - 135 U/L Final     AST   Date Value Ref Range Status   03/02/2022 17 10 - 40 U/L Final     ALT   Date Value Ref Range Status   03/02/2022 18 10 - 44 U/L Final     Anion Gap   Date Value Ref Range Status   03/26/2021 9 8 - 16 mmol/L Final     eGFR if    Date Value Ref Range Status   03/26/2021 >60.0 >60 mL/min/1.73 m^2 Final     eGFR if non    Date Value Ref Range Status   03/26/2021 >60.0 >60 mL/min/1.73 m^2 Final     Comment:     Calculation used to obtain the estimated glomerular filtration  rate (eGFR) is the CKD-EPI equation.            MS Impression and Plan:     NEURO MULTIPLE SCLEROSIS IMPRESSION:   Plan:      We discussed alternatives to Gilenya, focusing mainly on  Ocrevus. However, Camila has concerns about immunosuppression with Ocrevus in the setting of COVID. I advised that the rationale to switch to Ocrevus for now would be mainly for lessening chance or rebound relapse after discontinuing Gilenya, but that it could be reasonable to consider something less immunosuppressive, such as Aubagio, in the future. For now, she does not want to switch medications. She would like to wait and see how long this period in between plantar wart flares lasts.     I will see her back in November as scheduled or sooner if needed.       Sue Arellano, BRANDON, CNS

## 2022-08-08 ENCOUNTER — TELEPHONE (OUTPATIENT)
Dept: NEUROLOGY | Facility: CLINIC | Age: 42
End: 2022-08-08
Payer: COMMERCIAL

## 2022-08-08 DIAGNOSIS — G35 MULTIPLE SCLEROSIS: Primary | ICD-10-CM

## 2022-08-08 NOTE — TELEPHONE ENCOUNTER
----- Message from Heidy [employer]ting sent at 8/8/2022  8:45 AM CDT -----  Pt called in about getting a Evusheld injection. Please put order in and contact pt to schedule.              Pt can be reached at [phone]            TY

## 2022-09-01 ENCOUNTER — PATIENT MESSAGE (OUTPATIENT)
Dept: NEUROLOGY | Facility: CLINIC | Age: 42
End: 2022-09-01
Payer: COMMERCIAL

## 2022-09-01 ENCOUNTER — LAB VISIT (OUTPATIENT)
Dept: LAB | Facility: HOSPITAL | Age: 42
End: 2022-09-01
Attending: PSYCHIATRY & NEUROLOGY
Payer: COMMERCIAL

## 2022-09-01 DIAGNOSIS — G35 MULTIPLE SCLEROSIS: ICD-10-CM

## 2022-09-01 LAB
ALBUMIN SERPL BCP-MCNC: 3.6 G/DL (ref 3.5–5.2)
ALP SERPL-CCNC: 66 U/L (ref 55–135)
ALT SERPL W/O P-5'-P-CCNC: 35 U/L (ref 10–44)
AST SERPL-CCNC: 31 U/L (ref 10–40)
BASOPHILS # BLD AUTO: 0.02 K/UL (ref 0–0.2)
BASOPHILS NFR BLD: 0.6 % (ref 0–1.9)
BILIRUB DIRECT SERPL-MCNC: <0.1 MG/DL (ref 0.1–0.3)
BILIRUB SERPL-MCNC: 0.3 MG/DL (ref 0.1–1)
DIFFERENTIAL METHOD: ABNORMAL
EOSINOPHIL # BLD AUTO: 0 K/UL (ref 0–0.5)
EOSINOPHIL NFR BLD: 1.2 % (ref 0–8)
ERYTHROCYTE [DISTWIDTH] IN BLOOD BY AUTOMATED COUNT: 12.7 % (ref 11.5–14.5)
HCT VFR BLD AUTO: 42.1 % (ref 37–48.5)
HGB BLD-MCNC: 13.9 G/DL (ref 12–16)
IMM GRANULOCYTES # BLD AUTO: 0.02 K/UL (ref 0–0.04)
IMM GRANULOCYTES NFR BLD AUTO: 0.6 % (ref 0–0.5)
LYMPHOCYTES # BLD AUTO: 0.7 K/UL (ref 1–4.8)
LYMPHOCYTES NFR BLD: 19.9 % (ref 18–48)
MCH RBC QN AUTO: 31.5 PG (ref 27–31)
MCHC RBC AUTO-ENTMCNC: 33 G/DL (ref 32–36)
MCV RBC AUTO: 96 FL (ref 82–98)
MONOCYTES # BLD AUTO: 0.7 K/UL (ref 0.3–1)
MONOCYTES NFR BLD: 20.2 % (ref 4–15)
NEUTROPHILS # BLD AUTO: 1.9 K/UL (ref 1.8–7.7)
NEUTROPHILS NFR BLD: 57.5 % (ref 38–73)
NRBC BLD-RTO: 0 /100 WBC
PLATELET # BLD AUTO: 299 K/UL (ref 150–450)
PMV BLD AUTO: 10.7 FL (ref 9.2–12.9)
PROT SERPL-MCNC: 6.7 G/DL (ref 6–8.4)
RBC # BLD AUTO: 4.41 M/UL (ref 4–5.4)
WBC # BLD AUTO: 3.36 K/UL (ref 3.9–12.7)

## 2022-09-01 PROCEDURE — 36415 COLL VENOUS BLD VENIPUNCTURE: CPT | Performed by: PSYCHIATRY & NEUROLOGY

## 2022-09-01 PROCEDURE — 80076 HEPATIC FUNCTION PANEL: CPT | Performed by: PSYCHIATRY & NEUROLOGY

## 2022-09-01 PROCEDURE — 85025 COMPLETE CBC W/AUTO DIFF WBC: CPT | Performed by: PSYCHIATRY & NEUROLOGY

## 2022-09-07 ENCOUNTER — PATIENT MESSAGE (OUTPATIENT)
Dept: ADMINISTRATIVE | Facility: OTHER | Age: 42
End: 2022-09-07
Payer: COMMERCIAL

## 2022-09-08 ENCOUNTER — PATIENT MESSAGE (OUTPATIENT)
Dept: NEUROLOGY | Facility: CLINIC | Age: 42
End: 2022-09-08
Payer: COMMERCIAL

## 2022-09-29 ENCOUNTER — INFUSION (OUTPATIENT)
Dept: INFUSION THERAPY | Facility: HOSPITAL | Age: 42
End: 2022-09-29
Attending: CLINICAL NURSE SPECIALIST
Payer: COMMERCIAL

## 2022-09-29 VITALS
RESPIRATION RATE: 18 BRPM | SYSTOLIC BLOOD PRESSURE: 109 MMHG | OXYGEN SATURATION: 100 % | DIASTOLIC BLOOD PRESSURE: 59 MMHG | TEMPERATURE: 98 F | HEART RATE: 73 BPM

## 2022-09-29 DIAGNOSIS — G35 MULTIPLE SCLEROSIS: Primary | ICD-10-CM

## 2022-09-29 DIAGNOSIS — D84.821 IMMUNOSUPPRESSION DUE TO DRUG THERAPY: ICD-10-CM

## 2022-09-29 DIAGNOSIS — Z79.899 IMMUNOSUPPRESSION DUE TO DRUG THERAPY: ICD-10-CM

## 2022-09-29 PROCEDURE — M0220 HC INJECTION ADMIN, TIXAGEVIMAB-CILGAVIMAB, INCL POST ADMIN MONIT: HCPCS | Performed by: INTERNAL MEDICINE

## 2022-09-29 PROCEDURE — 63600175 PHARM REV CODE 636 W HCPCS: Performed by: INTERNAL MEDICINE

## 2022-09-29 RX ORDER — ONDANSETRON 4 MG/1
4 TABLET, ORALLY DISINTEGRATING ORAL ONCE AS NEEDED
Status: DISCONTINUED | OUTPATIENT
Start: 2022-09-29 | End: 2022-09-29 | Stop reason: HOSPADM

## 2022-09-29 RX ORDER — PREDNISONE 20 MG/1
40 TABLET ORAL ONCE AS NEEDED
Status: CANCELLED | OUTPATIENT
Start: 2022-09-29

## 2022-09-29 RX ORDER — PREDNISONE 20 MG/1
40 TABLET ORAL ONCE AS NEEDED
Status: DISCONTINUED | OUTPATIENT
Start: 2022-09-29 | End: 2022-09-29 | Stop reason: HOSPADM

## 2022-09-29 RX ORDER — ALBUTEROL SULFATE 90 UG/1
2 AEROSOL, METERED RESPIRATORY (INHALATION) ONCE AS NEEDED
Status: CANCELLED | OUTPATIENT
Start: 2022-09-29

## 2022-09-29 RX ORDER — DIPHENHYDRAMINE HCL 25 MG
25 CAPSULE ORAL ONCE AS NEEDED
Status: DISCONTINUED | OUTPATIENT
Start: 2022-09-29 | End: 2022-09-29 | Stop reason: HOSPADM

## 2022-09-29 RX ORDER — ACETAMINOPHEN 325 MG/1
650 TABLET ORAL ONCE AS NEEDED
Status: CANCELLED | OUTPATIENT
Start: 2022-09-29

## 2022-09-29 RX ORDER — ALBUTEROL SULFATE 90 UG/1
2 AEROSOL, METERED RESPIRATORY (INHALATION) ONCE AS NEEDED
Status: DISCONTINUED | OUTPATIENT
Start: 2022-09-29 | End: 2022-09-29 | Stop reason: HOSPADM

## 2022-09-29 RX ORDER — ACETAMINOPHEN 325 MG/1
650 TABLET ORAL ONCE AS NEEDED
Status: DISCONTINUED | OUTPATIENT
Start: 2022-09-29 | End: 2022-09-29 | Stop reason: HOSPADM

## 2022-09-29 RX ORDER — EPINEPHRINE 0.3 MG/.3ML
0.3 INJECTION SUBCUTANEOUS ONCE AS NEEDED
Status: CANCELLED | OUTPATIENT
Start: 2022-09-29

## 2022-09-29 RX ORDER — ONDANSETRON 4 MG/1
4 TABLET, ORALLY DISINTEGRATING ORAL ONCE AS NEEDED
Status: CANCELLED | OUTPATIENT
Start: 2022-09-29

## 2022-09-29 RX ORDER — DIPHENHYDRAMINE HCL 25 MG
25 CAPSULE ORAL ONCE AS NEEDED
Status: CANCELLED | OUTPATIENT
Start: 2022-09-29

## 2022-09-29 RX ORDER — EPINEPHRINE 0.3 MG/.3ML
0.3 INJECTION SUBCUTANEOUS ONCE AS NEEDED
Status: DISCONTINUED | OUTPATIENT
Start: 2022-09-29 | End: 2022-09-29 | Stop reason: HOSPADM

## 2022-09-29 RX ADMIN — Medication 300 MG: at 03:09

## 2022-11-04 ENCOUNTER — PATIENT MESSAGE (OUTPATIENT)
Dept: NEUROLOGY | Facility: CLINIC | Age: 42
End: 2022-11-04
Payer: COMMERCIAL

## 2022-11-11 ENCOUNTER — TELEPHONE (OUTPATIENT)
Dept: NEUROLOGY | Facility: CLINIC | Age: 42
End: 2022-11-11
Payer: COMMERCIAL

## 2022-11-11 NOTE — TELEPHONE ENCOUNTER
----- Message from Tremontana Chevalier sent at 11/11/2022  3:36 PM CST -----  Regarding: refill   Adelita from [facility] calling to advise unable to contact pt re refill, verified same number on file.  Contacted pt's spouse who says that pt will renew med until appt. @ [phone].

## 2022-11-22 ENCOUNTER — OFFICE VISIT (OUTPATIENT)
Dept: OPHTHALMOLOGY | Facility: CLINIC | Age: 42
End: 2022-11-22
Payer: COMMERCIAL

## 2022-11-22 DIAGNOSIS — Z79.899 LONG-TERM CURRENT USE OF HIGH RISK MEDICATION OTHER THAN ANTICOAGULANT: ICD-10-CM

## 2022-11-22 DIAGNOSIS — G35 MS (MULTIPLE SCLEROSIS): Primary | ICD-10-CM

## 2022-11-22 PROCEDURE — 4010F ACE/ARB THERAPY RXD/TAKEN: CPT | Mod: CPTII,S$GLB,, | Performed by: OPHTHALMOLOGY

## 2022-11-22 PROCEDURE — 99213 PR OFFICE/OUTPT VISIT, EST, LEVL III, 20-29 MIN: ICD-10-PCS | Mod: S$GLB,,, | Performed by: OPHTHALMOLOGY

## 2022-11-22 PROCEDURE — 99999 PR PBB SHADOW E&M-EST. PATIENT-LVL III: CPT | Mod: PBBFAC,,, | Performed by: OPHTHALMOLOGY

## 2022-11-22 PROCEDURE — 99999 PR PBB SHADOW E&M-EST. PATIENT-LVL III: ICD-10-PCS | Mod: PBBFAC,,, | Performed by: OPHTHALMOLOGY

## 2022-11-22 PROCEDURE — 1160F PR REVIEW ALL MEDS BY PRESCRIBER/CLIN PHARMACIST DOCUMENTED: ICD-10-PCS | Mod: CPTII,S$GLB,, | Performed by: OPHTHALMOLOGY

## 2022-11-22 PROCEDURE — 1159F MED LIST DOCD IN RCRD: CPT | Mod: CPTII,S$GLB,, | Performed by: OPHTHALMOLOGY

## 2022-11-22 PROCEDURE — 1159F PR MEDICATION LIST DOCUMENTED IN MEDICAL RECORD: ICD-10-PCS | Mod: CPTII,S$GLB,, | Performed by: OPHTHALMOLOGY

## 2022-11-22 PROCEDURE — 92134 OCT, RETINA - OU - BOTH EYES: ICD-10-PCS | Mod: S$GLB,,, | Performed by: OPHTHALMOLOGY

## 2022-11-22 PROCEDURE — 1160F RVW MEDS BY RX/DR IN RCRD: CPT | Mod: CPTII,S$GLB,, | Performed by: OPHTHALMOLOGY

## 2022-11-22 PROCEDURE — 4010F PR ACE/ARB THEARPY RXD/TAKEN: ICD-10-PCS | Mod: CPTII,S$GLB,, | Performed by: OPHTHALMOLOGY

## 2022-11-22 PROCEDURE — 92134 CPTRZ OPH DX IMG PST SGM RTA: CPT | Mod: S$GLB,,, | Performed by: OPHTHALMOLOGY

## 2022-11-22 PROCEDURE — 99213 OFFICE O/P EST LOW 20 MIN: CPT | Mod: S$GLB,,, | Performed by: OPHTHALMOLOGY

## 2022-11-22 NOTE — PROGRESS NOTES
HPI    DLS:10/21/2021 Mohan  Patient here for annual check up MS-Gilenya.  Pt states OU vision stable w/correction.  Notice a twitching of MIKAYLA past 2 weeks.    Review OCT    I have personally interviewed the patient, reviewed the history and   examined the patient and agree with the technician's exam.   Last edited by Devan Preston MD on 11/22/2022  3:36 PM.            Assessment /Plan     For exam results, see Encounter Report.    MS (multiple sclerosis)  -     Cancel: [facility] - OU - Extended - Both Eyes; Future  -     OCT, [facility] - OU - Both Eyes; Future    Long-term current use of high risk medication other than anticoagulant  -     Cancel: [facility] - OU - Extended - Both Eyes; Future  -     OCT, [facility] - OU - Both Eyes; Future      No  evidence of toxicity from Gilenya.. No new changes in the CNS related to her MS. Return one year related to Gilenya.

## 2022-11-28 ENCOUNTER — OFFICE VISIT (OUTPATIENT)
Dept: NEUROLOGY | Facility: CLINIC | Age: 42
End: 2022-11-28
Payer: COMMERCIAL

## 2022-11-28 VITALS
DIASTOLIC BLOOD PRESSURE: 82 MMHG | BODY MASS INDEX: 23.29 KG/M2 | HEART RATE: 77 BPM | WEIGHT: 144.94 LBS | SYSTOLIC BLOOD PRESSURE: 114 MMHG | HEIGHT: 66 IN

## 2022-11-28 DIAGNOSIS — Z79.899 HIGH RISK MEDICATION USE: ICD-10-CM

## 2022-11-28 DIAGNOSIS — Z29.89 PROPHYLACTIC IMMUNOTHERAPY: ICD-10-CM

## 2022-11-28 DIAGNOSIS — G35 MULTIPLE SCLEROSIS: Primary | ICD-10-CM

## 2022-11-28 DIAGNOSIS — Z71.89 COUNSELING REGARDING GOALS OF CARE: ICD-10-CM

## 2022-11-28 PROCEDURE — 3079F DIAST BP 80-89 MM HG: CPT | Mod: CPTII,S$GLB,, | Performed by: CLINICAL NURSE SPECIALIST

## 2022-11-28 PROCEDURE — 1159F PR MEDICATION LIST DOCUMENTED IN MEDICAL RECORD: ICD-10-PCS | Mod: CPTII,S$GLB,, | Performed by: CLINICAL NURSE SPECIALIST

## 2022-11-28 PROCEDURE — 3074F PR MOST RECENT SYSTOLIC BLOOD PRESSURE < 130 MM HG: ICD-10-PCS | Mod: CPTII,S$GLB,, | Performed by: CLINICAL NURSE SPECIALIST

## 2022-11-28 PROCEDURE — 99214 PR OFFICE/OUTPT VISIT, EST, LEVL IV, 30-39 MIN: ICD-10-PCS | Mod: S$GLB,,, | Performed by: CLINICAL NURSE SPECIALIST

## 2022-11-28 PROCEDURE — 99999 PR PBB SHADOW E&M-EST. PATIENT-LVL IV: CPT | Mod: PBBFAC,,, | Performed by: CLINICAL NURSE SPECIALIST

## 2022-11-28 PROCEDURE — 3074F SYST BP LT 130 MM HG: CPT | Mod: CPTII,S$GLB,, | Performed by: CLINICAL NURSE SPECIALIST

## 2022-11-28 PROCEDURE — 3008F PR BODY MASS INDEX (BMI) DOCUMENTED: ICD-10-PCS | Mod: CPTII,S$GLB,, | Performed by: CLINICAL NURSE SPECIALIST

## 2022-11-28 PROCEDURE — 99999 PR PBB SHADOW E&M-EST. PATIENT-LVL IV: ICD-10-PCS | Mod: PBBFAC,,, | Performed by: CLINICAL NURSE SPECIALIST

## 2022-11-28 PROCEDURE — 4010F ACE/ARB THERAPY RXD/TAKEN: CPT | Mod: CPTII,S$GLB,, | Performed by: CLINICAL NURSE SPECIALIST

## 2022-11-28 PROCEDURE — 1159F MED LIST DOCD IN RCRD: CPT | Mod: CPTII,S$GLB,, | Performed by: CLINICAL NURSE SPECIALIST

## 2022-11-28 PROCEDURE — 99214 OFFICE O/P EST MOD 30 MIN: CPT | Mod: S$GLB,,, | Performed by: CLINICAL NURSE SPECIALIST

## 2022-11-28 PROCEDURE — 4010F PR ACE/ARB THEARPY RXD/TAKEN: ICD-10-PCS | Mod: CPTII,S$GLB,, | Performed by: CLINICAL NURSE SPECIALIST

## 2022-11-28 PROCEDURE — 3079F PR MOST RECENT DIASTOLIC BLOOD PRESSURE 80-89 MM HG: ICD-10-PCS | Mod: CPTII,S$GLB,, | Performed by: CLINICAL NURSE SPECIALIST

## 2022-11-28 PROCEDURE — 3008F BODY MASS INDEX DOCD: CPT | Mod: CPTII,S$GLB,, | Performed by: CLINICAL NURSE SPECIALIST

## 2022-11-28 NOTE — PROGRESS NOTES
Subjective:          Patient ID: Camila Dawn is a right-handed 42 y.o. female who presents today for a routine clinic visit for MS.  She was last seen virtually in September 2022. The history has been provided by the patient. She is accompanied by her .     MS HPI:  DMT: fingolimod; switching to generic soon   Side effects from DMT? No  Taking vitamin D3 as recommended? Yes -  Dose: 4500 to 5000 units daily   Plantar warts seem to have resolved. She has changed her diet. She has lost 20lbs since July. She has also been exercising more regularly.   She is vaccinated for COVID x5. She had her latest booster in September. She has also received her flu shot on 10/12.   She denies any new neurological symptoms, but she feels like her memory is getting worse--mostly short-term. She recently left the stove turned on, and she left the front door unlocked all night.     Medications:  Current Outpatient Medications   Medication Sig    alpha lipoic acid 100 mg Cap Take by mouth.    ascorbic acid, vitamin C, (VITAMIN C) 500 MG tablet Take by mouth.    azelastine (ASTELIN) 137 mcg (0.1 %) nasal spray 2 sprays 2 (two) times daily.    b complex vitamins tablet Take 1 tablet by mouth once daily.    cinnamon bark (CINNAMON ORAL) Take by mouth.    fexofenadine (ALLEGRA) 180 MG tablet Take 180 mg by mouth once daily.    FLAXSEED ORAL Take by mouth.    fluticasone (FLONASE) 50 mcg/actuation nasal spray 1 spray by Each Nare route once daily.    GILENYA 0.5 mg Cap TAKE 1 CAPSULE BY MOUTH  ONCE DAILY MONDAY THROUGH  FRIDAY    lisinopril (PRINIVIL,ZESTRIL) 40 MG tablet Take 40 mg by mouth once daily.     loratadine (CLARITIN) 10 mg tablet loratadine 10 mg tablet   Take 1 tablet every day by oral route as needed.    metformin (GLUCOPHAGE) 1000 MG tablet TK 1 T PO  BID    modafiniL (PROVIGIL) 200 MG Tab Take half tablet (100mg) in AM and half tablet (100mg) at noon.    multivitamin (THERAGRAN) per tablet Take 1 tablet by  "mouth once daily.    norgestimate-ethinyl estradiol (ORTHO TRI-CYCLEN,TRI-SPRINTEC) 0.18/0.215/0.25 mg-35 mcg (28) tablet Take 1 tablet by mouth once daily.    sodium,calcium,mag,pot oxybate (XYWAV) 0.5 gram/mL Soln Take 4.5 g by mouth every evening. Takes 4.5 g PO twice nightly.    triamcinolone acetonide 0.1% (KENALOG) 0.1 % cream Apply topically continuous prn.     vitamin D 1000 units Tab Take 4,000 Units by mouth once daily.        SOCIAL HISTORY  Social History     Tobacco Use    Smoking status: Never    Smokeless tobacco: Never   Substance Use Topics    Alcohol use: Yes     Comment: rarely    Drug use: No       Living arrangements - the patient lives with their family.    ROS:  REVIEW OF SYMPTOMS 11/27/2022   Do you feel abnormally tired on most days? No--depends on the day; her anxiety contributes to feeling more fatigued.    Do you feel you generally sleep well? Yes--usually sleeps well   Do you have difficulty controlling your bladder?  No   Do you have difficulty controlling your bowels?  No   Do you have frequent muscle cramps, tightness or spasms in your limbs?  No--she has had some cramping in her right calf and in her feet; didn't last long. She has also had some tightness/cramping to the left side/torso.    Do you have new visual symptoms?  No; just saw Dr. Preston last week    Do you have worsening difficulty with your memory or thinking? Yes--as above    Do you have worsening symptoms of anxiety or depression?  No--she sees a therapist weekly; she has "bad emotional days" that are unpredictable.    For patients who walk, Do you have more difficulty walking?  No   Have you fallen since your last visit?  No   For patients who use wheelchairs: Do you have any skin wounds or breakdown? Not Applicable   Do you have difficulty using your hands?  No--drops things at times; dropped a glass of water at a restaurant recently.    Do you have shooting or burning pain? No   Do you have difficulty with sexual " function?  No   If you are sexually active, are you using birth control? Y/N  N/A Yes   Do you often choke when swallowing liquids or solid food?  No   Do you experience worsening symptoms when overheated? Yes--she is heat sensitive--makes her feel exhausted. She has to limit her gardening activities during the summer. She has a cooling vest.    Do you need any new equipment such as a wheelchair, walker or shower chair? No   Do you receive co-pay financial assistance for your principal MS medicine? She will know in January if she has to pay out of pocket for generic.    Would you be interested in participating in an MS research trial in the future? Yes   For patients on Gilenya, Tecfidera, Aubagio, Rituxan, Ocrevus, Tysabri, Lemtrada or Methotrexate, are you aware that you should NOT receive live virus vaccines?  Yes   Do you feel you have adequate family/friend support?  Yes   Do you have health insurance?   Yes   Are you currently employed? No   Do you receive SSDI/SSI?  No--she was denied for SSDI   Do you use marijuana or cannabis products? No   How often? -   Have you been diagnosed with a urinary tract infection since your last visit here? No   Have you been diagnosed with a respiratory tract infection since your last visit here? No   Have you been to the emergency room since your last visit here? No   Have you been hospitalized since your last visit here?  No            Objective:        1. 25 foot timed walk:  Timed 25 Foot Walk: 5/25/2022 11/28/2022   Did patient wear an AFO? No No   Was assistive device used? No No   Time for 25 Foot Walk (seconds) 3.4 3.4   Time for 25 Foot Walk (seconds) - 3.5       Neurologic Exam     Mental Status   Oriented to person, place, and time.   Attention: normal.   Speech: speech is normal   Level of consciousness: alert  Normal comprehension.     Cranial Nerves     CN II   Visual acuity: normal with correction    CN III, IV, VI   Extraocular motions are normal.     CN V    Facial sensation intact.     CN VII   Facial expression full, symmetric.     CN VIII   Hearing: intact (to finger rub)    CN IX, X   Palate: symmetric    CN XI   CN XI normal.     CN XII   Tongue deviation: none    Motor Exam   Muscle bulk: normal  Overall muscle tone: normal    Strength   Right neck flexion: 5/5  Left neck flexion: 5/5  Right neck extension: 5/5  Left neck extension: 5/5  Right deltoid: 5/5  Left deltoid: 5/5  Right biceps: 5/5  Left biceps: 5/5  Right triceps: 5/5  Left triceps: 5/5  Right wrist flexion: 5/5  Left wrist flexion: 5/5  Right wrist extension: 5/5  Left wrist extension: 5/5  Right interossei: 5/5  Left interossei: 5/5  Right iliopsoas: 5/5  Left iliopsoas: 5/5  Right quadriceps: 5/5  Left quadriceps: 5/5  Right hamstrin/5  Left hamstrin/5  Right anterior tibial: 5/5  Left anterior tibial: 5/5  Right gastroc: 5/5  Left gastroc: 5/5    Sensory Exam   Light touch normal.   Right arm vibration: normal  Left arm vibration: normal  Right leg vibration: decreased from toes  Left leg vibration: decreased from toes    Gait, Coordination, and Reflexes     Gait  Gait: normal    Coordination   Romberg: negative  Finger to nose coordination: normal  Heel to shin coordination: normal  Tandem walking coordination: normal    Tremor   Resting tremor: absent    Reflexes   Right brachioradialis: 2+  Left brachioradialis: 2+  Right biceps: 2+  Left biceps: 2+  Right triceps: 2+  Left triceps: 2+  Right patellar: 2+  Left patellar: 2+  Right achilles: 2+  Left achilles: 2+  Patient able to perform heel/toe walk normally-  Patient able to hop on each foot ten times.   Normal RSM in UE and LE        Imaging:     Results for orders placed during the hospital encounter of 22    MRI Brain Demyelinating Without Contrast    Impression  Brain appears stable from prior exam.  No new discrete lesions to indicate ongoing demyelination.    Electronically signed by resident: Ash  Denver  Date:    02/24/2022  Time:    11:52    Electronically signed by: Joe Kemp MD  Date:    02/24/2022  Time:    14:41        Labs:     Lab Results   Component Value Date    KOXDZFHK08FB 61 03/02/2022    [id number] 88 10/19/2020    [id number] 111 (H) 09/17/2020     Lab Results   Component Value Date    JCVINDEX 0.20 (A) 09/17/2019    JCVANTIBODY Indeterminate (A) 09/17/2019       Lab Results   Component Value Date    WBC 3.36 (L) 09/01/2022    RBC 4.41 09/01/2022    HGB 13.9 09/01/2022    HCT 42.1 09/01/2022    MCV 96 09/01/2022    MCH 31.5 (H) 09/01/2022    MCHC 33.0 09/01/2022    RDW 12.7 09/01/2022     09/01/2022    MPV 10.7 09/01/2022    GRAN 1.9 09/01/2022    GRAN 57.5 09/01/2022    LYMPH 0.7 (L) 09/01/2022    LYMPH 19.9 09/01/2022    MONO 0.7 09/01/2022    MONO 20.2 (H) 09/01/2022    EOS 0.0 09/01/2022    BASO 0.02 09/01/2022    EOSINOPHIL 1.2 09/01/2022    BASOPHIL 0.6 09/01/2022     Sodium   Date Value Ref Range Status   03/26/2021 139 136 - 145 mmol/L Final     Potassium   Date Value Ref Range Status   03/26/2021 4.7 3.5 - 5.1 mmol/L Final     Chloride   Date Value Ref Range Status   03/26/2021 104 95 - 110 mmol/L Final     CO2   Date Value Ref Range Status   03/26/2021 26 23 - 29 mmol/L Final     Glucose   Date Value Ref Range Status   03/26/2021 84 70 - 110 mg/dL Final     BUN   Date Value Ref Range Status   03/26/2021 11 6 - 20 mg/dL Final     Creatinine   Date Value Ref Range Status   03/26/2021 0.7 0.5 - 1.4 mg/dL Final     Calcium   Date Value Ref Range Status   03/26/2021 9.3 8.7 - 10.5 mg/dL Final     Total Protein   Date Value Ref Range Status   09/01/2022 6.7 6.0 - 8.4 g/dL Final     Albumin   Date Value Ref Range Status   09/01/2022 3.6 3.5 - 5.2 g/dL Final     Total Bilirubin   Date Value Ref Range Status   09/01/2022 0.3 0.1 - 1.0 mg/dL Final     Comment:     For infants and newborns, interpretation of results should be based  on gestational age, weight and in agreement with  clinical  observations.    Premature Infant recommended reference ranges:  Up to 24 hours.............<8.0 mg/dL  Up to 48 hours............<12.0 mg/dL  3-5 days..................<15.0 mg/dL  6-29 days.................<15.0 mg/dL       Alkaline Phosphatase   Date Value Ref Range Status   09/01/2022 66 55 - 135 U/L Final     AST   Date Value Ref Range Status   09/01/2022 31 10 - 40 U/L Final     ALT   Date Value Ref Range Status   09/01/2022 35 10 - 44 U/L Final     Anion Gap   Date Value Ref Range Status   03/26/2021 9 8 - 16 mmol/L Final     eGFR if    Date Value Ref Range Status   03/26/2021 >60.0 >60 mL/min/1.73 m^2 Final     eGFR if non    Date Value Ref Range Status   03/26/2021 >60.0 >60 mL/min/1.73 m^2 Final     Comment:     Calculation used to obtain the estimated glomerular filtration  rate (eGFR) is the CKD-EPI equation.          MS Impression and Plan:     NEURO MULTIPLE SCLEROSIS IMPRESSION:   MS Status:     Number of relapses in the past year?:  0    Clinical Progression:  Clinically Stable  Plan:     DMT:  No change in management    DMT comment:  Continue fingolimod and Vitamin D. Safety labs are planned for February. She is aware of the risks associated with immunosuppressant therapy, including increased risk of infection.         Symptom Management:  Implement change in symptom management    Implement Change in Symptom Management:  Cognitive (Will monitor cognitive symptoms and consider NP testing in the future.)       MRI brain planned for February.   She will follow up with Dr. Lundy in April.       Total time spent with patient: 39 minutes   Total time spent on encounter: 55 minutes         BRANDON Olivas, CNS    Problem List Items Addressed This Visit    None  Visit Diagnoses       Multiple sclerosis    -  Primary    Relevant Orders    CBC auto differential    MRI Brain Demyelinating Without Contrast    Comprehensive Metabolic Panel

## 2022-12-01 ENCOUNTER — PATIENT MESSAGE (OUTPATIENT)
Dept: NEUROLOGY | Facility: CLINIC | Age: 42
End: 2022-12-01
Payer: COMMERCIAL

## 2022-12-01 ENCOUNTER — PATIENT MESSAGE (OUTPATIENT)
Dept: PSYCHIATRY | Facility: CLINIC | Age: 42
End: 2022-12-01
Payer: COMMERCIAL

## 2023-01-30 ENCOUNTER — PATIENT MESSAGE (OUTPATIENT)
Dept: NEUROLOGY | Facility: CLINIC | Age: 43
End: 2023-01-30
Payer: COMMERCIAL

## 2023-01-30 DIAGNOSIS — G35 MS (MULTIPLE SCLEROSIS): Primary | ICD-10-CM

## 2023-02-02 ENCOUNTER — PATIENT MESSAGE (OUTPATIENT)
Dept: NEUROLOGY | Facility: CLINIC | Age: 43
End: 2023-02-02
Payer: COMMERCIAL

## 2023-02-02 RX ORDER — FINGOLIMOD HYDROCHLORIDE 0.5 MG/1
CAPSULE ORAL
Qty: 22 CAPSULE | Refills: 0 | Status: SHIPPED | OUTPATIENT
Start: 2023-02-02 | End: 2023-02-05 | Stop reason: SDUPTHER

## 2023-02-05 DIAGNOSIS — G35 MS (MULTIPLE SCLEROSIS): ICD-10-CM

## 2023-02-10 ENCOUNTER — HOSPITAL ENCOUNTER (OUTPATIENT)
Dept: RADIOLOGY | Facility: HOSPITAL | Age: 43
Discharge: HOME | End: 2023-02-10
Attending: CLINICAL NURSE SPECIALIST
Payer: COMMERCIAL

## 2023-02-10 DIAGNOSIS — G35 MULTIPLE SCLEROSIS: ICD-10-CM

## 2023-02-10 PROCEDURE — 70551 MRI BRAIN STEM W/O DYE: CPT | Mod: TC

## 2023-02-10 PROCEDURE — 70551 MRI BRAIN DEMYELINATING WITHOUT CONTRAST: ICD-10-PCS | Mod: 26,,, | Performed by: RADIOLOGY

## 2023-02-10 PROCEDURE — 70551 MRI BRAIN STEM W/O DYE: CPT | Mod: 26,,, | Performed by: RADIOLOGY

## 2023-02-11 ENCOUNTER — PATIENT MESSAGE (OUTPATIENT)
Dept: NEUROLOGY | Facility: CLINIC | Age: 43
End: 2023-02-11
Payer: COMMERCIAL

## 2023-02-14 ENCOUNTER — PATIENT MESSAGE (OUTPATIENT)
Dept: NEUROLOGY | Facility: CLINIC | Age: 43
End: 2023-02-14
Payer: COMMERCIAL

## 2023-02-15 ENCOUNTER — DOCUMENTATION ONLY (OUTPATIENT)
Dept: NEUROLOGY | Facility: CLINIC | Age: 43
End: 2023-02-15
Payer: COMMERCIAL

## 2023-02-15 NOTE — PROGRESS NOTES
Faxed pt's MRI brain demyelinating w/o contrast to Dr. Pan Danielle at [facility] at [phone].

## 2023-02-20 ENCOUNTER — PATIENT MESSAGE (OUTPATIENT)
Dept: PSYCHIATRY | Facility: CLINIC | Age: 43
End: 2023-02-20
Payer: COMMERCIAL

## 2023-02-23 RX ORDER — FINGOLIMOD HYDROCHLORIDE 0.5 MG/1
CAPSULE ORAL
Qty: 22 CAPSULE | Refills: 5 | Status: SHIPPED | OUTPATIENT
Start: 2023-02-23 | End: 2023-08-15

## 2023-03-02 ENCOUNTER — PATIENT MESSAGE (OUTPATIENT)
Dept: NEUROLOGY | Facility: CLINIC | Age: 43
End: 2023-03-02
Payer: COMMERCIAL

## 2023-05-08 ENCOUNTER — OFFICE VISIT (OUTPATIENT)
Dept: NEUROLOGY | Facility: CLINIC | Age: 43
End: 2023-05-08
Payer: COMMERCIAL

## 2023-05-08 VITALS
WEIGHT: 154 LBS | DIASTOLIC BLOOD PRESSURE: 79 MMHG | SYSTOLIC BLOOD PRESSURE: 116 MMHG | HEART RATE: 81 BPM | BODY MASS INDEX: 24.75 KG/M2 | HEIGHT: 66 IN

## 2023-05-08 DIAGNOSIS — Z71.89 COUNSELING REGARDING GOALS OF CARE: ICD-10-CM

## 2023-05-08 DIAGNOSIS — E55.9 VITAMIN D DEFICIENCY: ICD-10-CM

## 2023-05-08 DIAGNOSIS — G35 MS (MULTIPLE SCLEROSIS): Primary | ICD-10-CM

## 2023-05-08 DIAGNOSIS — D84.9 IMMUNOCOMPROMISED: ICD-10-CM

## 2023-05-08 PROCEDURE — 1160F PR REVIEW ALL MEDS BY PRESCRIBER/CLIN PHARMACIST DOCUMENTED: ICD-10-PCS | Mod: CPTII,S$GLB,, | Performed by: PSYCHIATRY & NEUROLOGY

## 2023-05-08 PROCEDURE — 99214 OFFICE O/P EST MOD 30 MIN: CPT | Mod: S$GLB,,, | Performed by: PSYCHIATRY & NEUROLOGY

## 2023-05-08 PROCEDURE — 3008F PR BODY MASS INDEX (BMI) DOCUMENTED: ICD-10-PCS | Mod: CPTII,S$GLB,, | Performed by: PSYCHIATRY & NEUROLOGY

## 2023-05-08 PROCEDURE — 1160F RVW MEDS BY RX/DR IN RCRD: CPT | Mod: CPTII,S$GLB,, | Performed by: PSYCHIATRY & NEUROLOGY

## 2023-05-08 PROCEDURE — 3074F PR MOST RECENT SYSTOLIC BLOOD PRESSURE < 130 MM HG: ICD-10-PCS | Mod: CPTII,S$GLB,, | Performed by: PSYCHIATRY & NEUROLOGY

## 2023-05-08 PROCEDURE — 4010F PR ACE/ARB THEARPY RXD/TAKEN: ICD-10-PCS | Mod: CPTII,S$GLB,, | Performed by: PSYCHIATRY & NEUROLOGY

## 2023-05-08 PROCEDURE — 4010F ACE/ARB THERAPY RXD/TAKEN: CPT | Mod: CPTII,S$GLB,, | Performed by: PSYCHIATRY & NEUROLOGY

## 2023-05-08 PROCEDURE — 3078F DIAST BP <80 MM HG: CPT | Mod: CPTII,S$GLB,, | Performed by: PSYCHIATRY & NEUROLOGY

## 2023-05-08 PROCEDURE — 3078F PR MOST RECENT DIASTOLIC BLOOD PRESSURE < 80 MM HG: ICD-10-PCS | Mod: CPTII,S$GLB,, | Performed by: PSYCHIATRY & NEUROLOGY

## 2023-05-08 PROCEDURE — 99214 PR OFFICE/OUTPT VISIT, EST, LEVL IV, 30-39 MIN: ICD-10-PCS | Mod: S$GLB,,, | Performed by: PSYCHIATRY & NEUROLOGY

## 2023-05-08 PROCEDURE — 99999 PR PBB SHADOW E&M-EST. PATIENT-LVL IV: ICD-10-PCS | Mod: PBBFAC,,, | Performed by: PSYCHIATRY & NEUROLOGY

## 2023-05-08 PROCEDURE — 3008F BODY MASS INDEX DOCD: CPT | Mod: CPTII,S$GLB,, | Performed by: PSYCHIATRY & NEUROLOGY

## 2023-05-08 PROCEDURE — 99999 PR PBB SHADOW E&M-EST. PATIENT-LVL IV: CPT | Mod: PBBFAC,,, | Performed by: PSYCHIATRY & NEUROLOGY

## 2023-05-08 PROCEDURE — 1159F MED LIST DOCD IN RCRD: CPT | Mod: CPTII,S$GLB,, | Performed by: PSYCHIATRY & NEUROLOGY

## 2023-05-08 PROCEDURE — 1159F PR MEDICATION LIST DOCUMENTED IN MEDICAL RECORD: ICD-10-PCS | Mod: CPTII,S$GLB,, | Performed by: PSYCHIATRY & NEUROLOGY

## 2023-05-08 PROCEDURE — 3074F SYST BP LT 130 MM HG: CPT | Mod: CPTII,S$GLB,, | Performed by: PSYCHIATRY & NEUROLOGY

## 2023-07-07 ENCOUNTER — PATIENT MESSAGE (OUTPATIENT)
Dept: NEUROLOGY | Facility: CLINIC | Age: 43
End: 2023-07-07
Payer: COMMERCIAL

## 2023-07-21 ENCOUNTER — PATIENT MESSAGE (OUTPATIENT)
Dept: PSYCHIATRY | Facility: CLINIC | Age: 43
End: 2023-07-21
Payer: COMMERCIAL

## 2023-08-04 DIAGNOSIS — G35 MS (MULTIPLE SCLEROSIS): ICD-10-CM

## 2023-08-10 ENCOUNTER — LAB VISIT (OUTPATIENT)
Dept: LAB | Facility: HOSPITAL | Age: 43
End: 2023-08-10
Attending: PSYCHIATRY & NEUROLOGY
Payer: COMMERCIAL

## 2023-08-10 ENCOUNTER — TELEPHONE (OUTPATIENT)
Dept: NEUROLOGY | Facility: CLINIC | Age: 43
End: 2023-08-10
Payer: COMMERCIAL

## 2023-08-10 DIAGNOSIS — E55.9 VITAMIN D DEFICIENCY: ICD-10-CM

## 2023-08-10 DIAGNOSIS — G35 MS (MULTIPLE SCLEROSIS): ICD-10-CM

## 2023-08-10 LAB
25(OH)D3+25(OH)D2 SERPL-MCNC: 65 NG/ML (ref 30–96)
ALBUMIN SERPL BCP-MCNC: 3.4 G/DL (ref 3.5–5.2)
ALP SERPL-CCNC: 52 U/L (ref 55–135)
ALT SERPL W/O P-5'-P-CCNC: 10 U/L (ref 10–44)
AST SERPL-CCNC: 14 U/L (ref 10–40)
BASOPHILS # BLD AUTO: 0.03 K/UL (ref 0–0.2)
BASOPHILS NFR BLD: 0.8 % (ref 0–1.9)
BILIRUB DIRECT SERPL-MCNC: 0.2 MG/DL (ref 0.1–0.3)
BILIRUB SERPL-MCNC: 0.3 MG/DL (ref 0.1–1)
DIFFERENTIAL METHOD: ABNORMAL
EOSINOPHIL # BLD AUTO: 0 K/UL (ref 0–0.5)
EOSINOPHIL NFR BLD: 0.8 % (ref 0–8)
ERYTHROCYTE [DISTWIDTH] IN BLOOD BY AUTOMATED COUNT: 12.9 % (ref 11.5–14.5)
HCT VFR BLD AUTO: 40.3 % (ref 37–48.5)
HGB BLD-MCNC: 13 G/DL (ref 12–16)
IMM GRANULOCYTES # BLD AUTO: 0.01 K/UL (ref 0–0.04)
IMM GRANULOCYTES NFR BLD AUTO: 0.3 % (ref 0–0.5)
LYMPHOCYTES # BLD AUTO: 0.7 K/UL (ref 1–4.8)
LYMPHOCYTES NFR BLD: 17.3 % (ref 18–48)
MCH RBC QN AUTO: 30.2 PG (ref 27–31)
MCHC RBC AUTO-ENTMCNC: 32.3 G/DL (ref 32–36)
MCV RBC AUTO: 94 FL (ref 82–98)
MONOCYTES # BLD AUTO: 0.8 K/UL (ref 0.3–1)
MONOCYTES NFR BLD: 21.2 % (ref 4–15)
NEUTROPHILS # BLD AUTO: 2.3 K/UL (ref 1.8–7.7)
NEUTROPHILS NFR BLD: 59.6 % (ref 38–73)
NRBC BLD-RTO: 0 /100 WBC
PLATELET # BLD AUTO: 324 K/UL (ref 150–450)
PMV BLD AUTO: 10.2 FL (ref 9.2–12.9)
PROT SERPL-MCNC: 6.5 G/DL (ref 6–8.4)
RBC # BLD AUTO: 4.31 M/UL (ref 4–5.4)
WBC # BLD AUTO: 3.82 K/UL (ref 3.9–12.7)

## 2023-08-10 PROCEDURE — 80076 HEPATIC FUNCTION PANEL: CPT | Performed by: PSYCHIATRY & NEUROLOGY

## 2023-08-10 PROCEDURE — 85025 COMPLETE CBC W/AUTO DIFF WBC: CPT | Performed by: PSYCHIATRY & NEUROLOGY

## 2023-08-10 PROCEDURE — 82306 VITAMIN D 25 HYDROXY: CPT | Performed by: PSYCHIATRY & NEUROLOGY

## 2023-08-10 PROCEDURE — 36415 COLL VENOUS BLD VENIPUNCTURE: CPT | Performed by: PSYCHIATRY & NEUROLOGY

## 2023-08-10 NOTE — TELEPHONE ENCOUNTER
----- Message from Hodan Larkin RN sent at 8/10/2023  2:58 PM CDT -----  Regarding: FW: Prescription  Contact: Emmanuelle [phone]    ----- Message -----  From: Leonora Russell  Sent: 8/10/2023   9:20 AM CDT  To: Adan ALVARADO Staff  Subject: Prescription                                     Calling to request new prescription or refill on medication Rx fingolimod (GILENYA) 0.5 mg Cap to be sent to pharmacy. Please call patient to confirm prescription has been sent to [facility].    \[facility]\"" Specialty [facility] - [address] [place] [address]  Phone: [phone] Fax: [phone]

## 2023-08-15 RX ORDER — FINGOLIMOD HYDROCHLORIDE 0.5 MG/1
CAPSULE ORAL
Qty: 22 CAPSULE | Refills: 5 | Status: SHIPPED | OUTPATIENT
Start: 2023-08-15 | End: 2024-02-08

## 2023-08-23 ENCOUNTER — PATIENT MESSAGE (OUTPATIENT)
Dept: NEUROLOGY | Facility: CLINIC | Age: 43
End: 2023-08-23
Payer: COMMERCIAL

## 2023-10-04 NOTE — PROGRESS NOTES
.ENDOSCOPIEScolonoscopy- 12/2022- 2.5 cm pedunculated polyp , TVA with HGD, no stalk involvement, margins free.  3 polyps removed were TAEGD- reflux esophagitis on biopsies , ow  negativeLABSIMAGES Subjective:          Patient ID: Camila Dawn is a 43 y.o. female who presents today for a routine clinic visit for MS.  She comes in with her .    MS HPI:  DMT: fingolimod 5 days / week   Side effects from DMT? No  Taking vitamin D3 as recommended? Yes -   Here for routine f/u. Accompanied by her .   Now on generic fingolimod;  has a high deductible plan and is on xywav and this medication hits deductible every year   She's had a little stiffness in her lower legs at the calf;  Feels different; not from over exercise; walking is stable;  exercise tolerance is stable;     Medications:  Current Outpatient Medications   Medication Sig    alpha lipoic acid 100 mg Cap Take by mouth.    ascorbic acid, vitamin C, (VITAMIN C) 500 MG tablet Take by mouth.    azelastine (ASTELIN) 137 mcg (0.1 %) nasal spray 2 sprays 2 (two) times daily.    b complex vitamins tablet Take 1 tablet by mouth once daily.    cinnamon bark (CINNAMON ORAL) Take by mouth.    fexofenadine (ALLEGRA) 180 MG tablet Take 180 mg by mouth once daily.    fingolimod (GILENYA) 0.5 mg Cap TAKE 1 CAPSULE BY MOUTH  ONCE DAILY MONDAY THROUGH  FRIDAY    FLAXSEED ORAL Take by mouth.    fluticasone (FLONASE) 50 mcg/actuation nasal spray 1 spray by Each Nare route once daily.    lisinopril (PRINIVIL,ZESTRIL) 40 MG tablet Take 40 mg by mouth once daily.     loratadine (CLARITIN) 10 mg tablet loratadine 10 mg tablet   Take 1 tablet every day by oral route as needed.    metformin (GLUCOPHAGE) 1000 MG tablet TK 1 T PO  BID    modafiniL (PROVIGIL) 200 MG Tab Take half tablet (100mg) in AM and half tablet (100mg) at noon.    multivitamin (THERAGRAN) per tablet Take 1 tablet by mouth once daily.    norgestimate-ethinyl estradiol (ORTHO TRI-CYCLEN,TRI-SPRINTEC) 0.18/0.215/0.25 mg-35 mcg (28) tablet Take 1 tablet by mouth once daily.    sodium,calcium,mag,pot oxybate (XYWAV) 0.5 gram/mL Soln Take 4.5 g by mouth every evening. Takes 4.5 g PO twice  nightly.    triamcinolone acetonide 0.1% (KENALOG) 0.1 % cream Apply topically continuous prn.     vitamin D 1000 units Tab Take 4,000 Units by mouth once daily.      SOCIAL HISTORY  Social History     Tobacco Use    Smoking status: Never    Smokeless tobacco: Never   Substance Use Topics    Alcohol use: Yes     Comment: rarely    Drug use: No       Living arrangements - the patient lives with their spouse.    REVIEW OF SYMPTOMS 5/5/2023   Do you feel abnormally tired on most days? No   Do you feel you generally sleep well? Yes   Do you have difficulty controlling your bladder?  No   Do you have difficulty controlling your bowels?  No   Do you have frequent muscle cramps, tightness or spasms in your limbs?  No   Do you have new visual symptoms?  No   Do you have worsening difficulty with your memory or thinking? No   Do you have worsening symptoms of anxiety or depression?  No   For patients who walk, Do you have more difficulty walking?  No   Have you fallen since your last visit?  No   For patients who use wheelchairs: Do you have any skin wounds or breakdown? Not Applicable   Do you have difficulty using your hands?  No   Do you have shooting or burning pain? No   Do you have difficulty with sexual function?  No   If you are sexually active, are you using birth control? Y/N  N/A Yes   Do you often choke when swallowing liquids or solid food?  No   Do you experience worsening symptoms when overheated? Yes   Do you need any new equipment such as a wheelchair, walker or shower chair? No   Do you receive co-pay financial assistance for your principal MS medicine? No   Would you be interested in participating in an MS research trial in the future? No   For patients on Gilenya, Tecfidera, Aubagio, Rituxan, Ocrevus, Tysabri, Lemtrada or Methotrexate, are you aware that you should NOT receive live virus vaccines?  Yes   Do you feel you have adequate family/friend support?  Yes   Do you have health insurance?   Yes   Are  you currently employed? No   Do you receive SSDI/SSI?  No   Do you use marijuana or cannabis products? No   How often? -   Have you been diagnosed with a urinary tract infection since your last visit here? No   Have you been diagnosed with a respiratory tract infection since your last visit here? No   Have you been to the emergency room since your last visit here? No   Have you been hospitalized since your last visit here?  No              Objective:          Timed 25 Foot Walk: 11/28/2022 5/8/2023   Did patient wear an AFO? No No   Was assistive device used? No No   Time for 25 Foot Walk (seconds) 3.4 3.7   Time for 25 Foot Walk (seconds) 3.5 3.7       9-Hole Peg Test: 11/14/2016   Dominant Hand Right   Time (seconds) - [facility] Trial 1 18   Time (seconds) - [facility] Trial 2 17   Time (seconds) - N[facility] Trial 1 20   Time (seconds) - N[facility] Trial 2 19     Neurologic Exam  MENTAL STATUS: grossly intact  CRANIAL NERVE EXAM: There is no internuclear ophthalmoplegia. Extraocular   muscles are intact.  No facial   asymmetry.There is no dysarthria.   MOTOR EXAM: Normal bulk and tone throughout UE and LE bilaterally. Rapid sequential movements are normal. Strength is 5/5 in all groups   in the lower extremities and upper extremities.   REFLEXES: Symmetric and 2+ throughout in all 4 extremities.   SENSORY EXAM: Normal to vibration t/o  COORDINATION: Normal finger-to-nose exam.   GAIT: Narrow based and stable.    Imaging:     Results for orders placed during the hospital encounter of 02/10/23    MRI Brain Demyelinating Without Contrast    Impression  Brain appears stable from prior exam, again demonstrating findings compatible with the reported history of multiple sclerosis.  No new discrete lesions to indicate ongoing demyelination.    Diminutive left maxillary sinus, favoring atelectasis (silent sinus syndrome) over developmental hypoplasia.      Electronically signed by: Devan Rodas MD  Date:    02/10/2023  Time:    10:33    No results  found for this or any previous visit.    No results found for this or any previous visit.    Results for orders placed during the hospital encounter of 12/10/18    MRI Brain Demyelinating W W/O Contrast    Impression  There has been no significant change from the prior examination of 09/07/2018 with continued scattered foci of T2/FLAIR hyperintensity within the supratentorial demyelinating plaque burden.  Please note there is continued diffusion hyperintensity associated with several of the lesions as well as some T1 hypointense lesions.  No definite new lesion or enhancement to suggest active demyelination.      Electronically signed by: Laura Johansen MD  Date:    12/10/2018  Time:    12:23    No results found for this or any previous visit.    No results found for this or any previous visit.        Labs:     Lab Results   Component Value Date    [id number] 61 03/02/2022    [id number] 88 10/19/2020    [id number] 111 (H) 09/17/2020     Lab Results   Component Value Date    JCVINDEX 0.20 (A) 09/17/2019    JCVANTIBODY Indeterminate (A) 09/17/2019     No results found for: SO9ZLQOL, ABSOLUTECD3, VL6YIHXC, ABSOLUTECD8, CC4FAXAC, ABSOLUTECD4, LABCD48  Lab Results   Component Value Date    WBC 5.22 02/10/2023    RBC 4.30 02/10/2023    HGB 13.5 02/10/2023    HCT 41.2 02/10/2023    MCV 96 02/10/2023    MCH 31.4 (H) 02/10/2023    MCHC 32.8 02/10/2023    RDW 12.9 02/10/2023     02/10/2023    MPV 10.2 02/10/2023    GRAN 3.6 02/10/2023    GRAN 68.9 02/10/2023    LYMPH 0.8 (L) 02/10/2023    LYMPH 14.4 (L) 02/10/2023    MONO 0.8 02/10/2023    MONO 14.4 02/10/2023    EOS 0.1 02/10/2023    BASO 0.03 02/10/2023    EOSINOPHIL 1.1 02/10/2023    BASOPHIL 0.6 02/10/2023     Sodium   Date Value Ref Range Status   02/10/2023 135 (L) 136 - 145 mmol/L Final     Potassium   Date Value Ref Range Status   02/10/2023 4.1 3.5 - 5.1 mmol/L Final     Chloride   Date Value Ref Range Status   02/10/2023 101 95 - 110 mmol/L Final      CO2   Date Value Ref Range Status   02/10/2023 23 23 - 29 mmol/L Final     Glucose   Date Value Ref Range Status   02/10/2023 81 70 - 110 mg/dL Final     BUN   Date Value Ref Range Status   02/10/2023 11 6 - 20 mg/dL Final     Creatinine   Date Value Ref Range Status   02/10/2023 0.6 0.5 - 1.4 mg/dL Final     Calcium   Date Value Ref Range Status   02/10/2023 9.5 8.7 - 10.5 mg/dL Final     Total Protein   Date Value Ref Range Status   02/10/2023 6.5 6.0 - 8.4 g/dL Final     Albumin   Date Value Ref Range Status   02/10/2023 3.3 (L) 3.5 - 5.2 g/dL Final     Total Bilirubin   Date Value Ref Range Status   02/10/2023 0.3 0.1 - 1.0 mg/dL Final     Comment:     For infants and newborns, interpretation of results should be based  on gestational age, weight and in agreement with clinical  observations.    Premature Infant recommended reference ranges:  Up to 24 hours.............<8.0 mg/dL  Up to 48 hours............<12.0 mg/dL  3-5 days..................<15.0 mg/dL  6-29 days.................<15.0 mg/dL       Alkaline Phosphatase   Date Value Ref Range Status   02/10/2023 57 55 - 135 U/L Final     AST   Date Value Ref Range Status   02/10/2023 15 10 - 40 U/L Final     ALT   Date Value Ref Range Status   02/10/2023 12 10 - 44 U/L Final     Anion Gap   Date Value Ref Range Status   02/10/2023 11 8 - 16 mmol/L Final     eGFR if    Date Value Ref Range Status   03/26/2021 >60.0 >60 mL/min/1.73 m^2 Final     eGFR if non    Date Value Ref Range Status   03/26/2021 >60.0 >60 mL/min/1.73 m^2 Final     Comment:     Calculation used to obtain the estimated glomerular filtration  rate (eGFR) is the CKD-EPI equation.        No results found for: HEPBSAG, HEPBSAB, HEPBCAB        MS Impression and Plan:     NEURO MULTIPLE SCLEROSIS IMPRESSION:   MS Status:     Number of relapses in the past year?:  0    Clinical Progression:  Clinically Stable    Clinical Progression comment:  Time walk just slightly  slower; she reports worsening tightness in LE; will monitor closely clinically; will add spine MRIs to scans next Feb    MRI Progression:  Stable  Plan:     DMT:  No change in management    Symptom Management:  No change in symptom management     Labs in August F/u 6 mo Sue Cortes CNS         Problem List Items Addressed This Visit          1 - High    MS (multiple sclerosis) - Primary    Relevant Orders    CBC Auto Differential    Hepatic Function Panel    Vitamin D     Other Visit Diagnoses       Immunocompromised        Counseling regarding goals of care        Vitamin D deficiency        Relevant Orders    Vitamin D            Jazmine Lundy MD    I spent a total of 30 minutes on the day of the visit.This includes face to face time and non-face to face time preparing to see the patient (eg, review of tests), obtaining and/or reviewing separately obtained history, documenting clinical information in the electronic or other health record, independently interpreting results and communicating results to the patient/family/caregiver, or care coordinator.

## 2023-11-10 NOTE — PROGRESS NOTES
Subjective:          Patient ID: Camila Dawn is a 43 y.o. female who presents today for a routine clinic visit for MS.  She was last seen by Dr. Lundy in May 2023. The history has been provided by the patient. She is accompanied by her .     MS HPI:  DMT: Gilenya   Side effects from DMT? No  Taking vitamin D3 as recommended? Yes   She saw her dermatologist for skin check on 8/9. She did have a mole removal. Over the weekend, she noticed something on her left shoulder and will follow up with dermatologist on Wednesday to have this checked out.   She sees Dr. Preston for annual eye exam on 11/30.   She had her PAP smear on 8/7.  She also had her mammogram, as well as a colonoscopy. There were no polyps on colonoscopy.   She had her updated COVID booster on September 9th.   She has started Co-Q-10, but does not see a benefit from this yet.  She is endorses some short-term memory issues. She uses a lot of shortcuts and reminders. Stress is a trigger for her.   She has had some random muscle pain/cramps to her left side. Heating pad, CBD helps; naproxen doesn't help. It can last from minutes to hours. She cannot identify any triggers.     Medications:  Current Outpatient Medications   Medication Sig    alpha lipoic acid 100 mg Cap Take by mouth.    ascorbic acid, vitamin C, (VITAMIN C) 500 MG tablet Take by mouth.    azelastine (ASTELIN) 137 mcg (0.1 %) nasal spray 2 sprays 2 (two) times daily.    b complex vitamins tablet Take 1 tablet by mouth once daily.    cinnamon bark (CINNAMON ORAL) Take by mouth.    fexofenadine (ALLEGRA) 180 MG tablet Take 180 mg by mouth once daily.    fingolimod (GILENYA) 0.5 mg Cap TAKE 1 CAPSULE BY MOUTH ONCE  DAILY MONDAY THROUGH FRIDAY    FLAXSEED ORAL Take by mouth.    fluticasone (FLONASE) 50 mcg/actuation nasal spray 1 spray by Each Nare route once daily.    lisinopril (PRINIVIL,ZESTRIL) 40 MG tablet Take 40 mg by mouth once daily.     loratadine (CLARITIN)  10 mg tablet loratadine 10 mg tablet   Take 1 tablet every day by oral route as needed.    metformin (GLUCOPHAGE) 1000 MG tablet TK 1 T PO  BID    modafiniL (PROVIGIL) 200 MG Tab Take half tablet (100mg) in AM and half tablet (100mg) at noon.    multivitamin (THERAGRAN) per tablet Take 1 tablet by mouth once daily.    norgestimate-ethinyl estradiol (ORTHO TRI-CYCLEN,TRI-SPRINTEC) 0.18/0.215/0.25 mg-35 mcg (28) tablet Take 1 tablet by mouth once daily.    sodium,calcium,mag,pot oxybate (XYWAV) 0.5 gram/mL Soln Take 4.5 g by mouth every evening. Takes 4.5 g PO twice nightly.    triamcinolone acetonide 0.1% (KENALOG) 0.1 % cream Apply topically continuous prn.     vitamin D 1000 units Tab Take 4,000 Units by mouth once daily.      SOCIAL HISTORY  Social History     Tobacco Use    Smoking status: Never    Smokeless tobacco: Never   Substance Use Topics    Alcohol use: Yes     Comment: rarely    Drug use: No       Living arrangements - the patient lives with her            11/12/2023     2:24 PM   REVIEW OF SYMPTOMS   Do you feel abnormally tired on most days? No--she is always tired; has narcolepsy   Do you feel you generally sleep well? Yes   Do you have difficulty controlling your bladder?  No--no UTIs    Do you have difficulty controlling your bowels?  No--she has been more constipated lately, but she has not been as active lately. She is staying hydrated.    Do you have frequent muscle cramps, tightness or spasms in your limbs?  No--side cramps; has some lower back pain    Do you have new visual symptoms?  No--wears glasses    Do you have worsening difficulty with your memory or thinking? No--as above    Do you have worsening symptoms of anxiety or depression?  No--health-related anxiety   For patients who walk, Do you have more difficulty walking?  No   Have you fallen since your last visit?  No   For patients who use wheelchairs: Do you have any skin wounds or breakdown? Not Applicable   Do you  have difficulty using your hands?  No   Do you have shooting or burning pain? No   Do you have difficulty with sexual function?  No   If you are sexually active, are you using birth control? Y/N  N/A Yes   Do you often choke when swallowing liquids or solid food?  No   Do you experience worsening symptoms when overheated? Yes--sensitive to heat    Do you need any new equipment such as a wheelchair, walker or shower chair? No   Do you receive co-pay financial assistance for your principal MS medicine? Has a high-deductible plan; meets OOP max    Would you be interested in participating in an MS research trial in the future? No   For patients on Gilenya, Tecfidera, Aubagio, Rituxan, Ocrevus, Tysabri, Lemtrada or Methotrexate, are you aware that you should NOT receive live virus vaccines?  Yes   Do you feel you have adequate family/friend support?  Yes   Do you have health insurance?   Yes   Are you currently employed? No   Do you receive SSDI/SSI?  No   Do you use marijuana or cannabis products? No   Have you been diagnosed with a urinary tract infection since your last visit here? No   Have you been diagnosed with a respiratory tract infection since your last visit here? No   Have you been to the emergency room since your last visit here? No   Have you been hospitalized since your last visit here?  No            Objective:        1. 25 foot timed walk:      5/8/2023    12:01 AM 11/13/2023    12:01 AM   Timed 25 Foot Walk:   Did patient wear an AFO? No No   Was assistive device used? No No   Time for 25 Foot Walk (seconds) 3.7 3.6   Time for 25 Foot Walk (seconds) 3.7 3.7     Neurologic Exam     Mental Status   Oriented to person, place, and time.   Attention: normal. Concentration: normal.   Speech: speech is normal   Level of consciousness: alert  Knowledge: good.   Normal comprehension.     Cranial Nerves     CN II   Visual acuity: normal with correction    CN III, IV, VI   Extraocular motions are normal.     CN V    Facial sensation intact.     CN VII   Facial expression full, symmetric.     CN VIII   Hearing: intact (to finger rub)    CN IX, X   Palate: symmetric    CN XI   CN XI normal.     CN XII   Tongue deviation: none    Motor Exam   Muscle bulk: normal  Overall muscle tone: normal    Strength   Right neck flexion: 5/5  Left neck flexion: 5/5  Right neck extension: 5/5  Left neck extension: 5/5  Right deltoid: 5/5  Left deltoid: 5/5  Right biceps: 5/5  Left biceps: 5/5  Right triceps: 5/5  Left triceps: 5/5  Right wrist flexion: 5/5  Left wrist flexion: 5/5  Right wrist extension: 5/5  Left wrist extension: 5/5  Right interossei: 5/5  Left interossei: 5/5  Right iliopsoas: 5/5  Left iliopsoas: 5/5  Right quadriceps: 5/5  Left quadriceps: 5/5  Right hamstrin/5  Left hamstrin/5  Right anterior tibial: 5/5  Left anterior tibial: 5/5  Right gastroc: 5/5  Left gastroc: 5/5    Sensory Exam   Right arm vibration: normal  Left arm vibration: normal  Right leg vibration: normal  Left leg vibration: normal    Gait, Coordination, and Reflexes     Gait  Gait: normal    Coordination   Romberg: negative  Finger to nose coordination: normal  Heel to shin coordination: normal  Tandem walking coordination: normal    Tremor   Resting tremor: absent    Reflexes   Right brachioradialis: 2+  Left brachioradialis: 2+  Right biceps: 2+  Left biceps: 2+  Right triceps: 2+  Left triceps: 2+  Right patellar: 2+  Left patellar: 2+  Right achilles: 2+  Left achilles: 2+  Right plantar: equivocal  Left plantar: equivocal  She can walk on toes and heels.   Patient able to hop on each foot ten times.   Normal RSM in UE and LE          Imaging:     Results for orders placed during the hospital encounter of 02/10/23    MRI Brain Demyelinating Without Contrast    Impression  Brain appears stable from prior exam, again demonstrating findings compatible with the reported history of multiple sclerosis.  No new discrete lesions to indicate ongoing  demyelination.    Diminutive left maxillary sinus, favoring atelectasis (silent sinus syndrome) over developmental hypoplasia.      Electronically signed by: Devan Rodas MD  Date:    02/10/2023  Time:    10:33      Labs:     Lab Results   Component Value Date    [id number] 65 08/10/2023    CXMZVIAH89GD 61 03/02/2022    JNLBFXNA82JG 88 10/19/2020     Lab Results   Component Value Date    WBC 3.82 (L) 08/10/2023    RBC 4.31 08/10/2023    HGB 13.0 08/10/2023    HCT 40.3 08/10/2023    MCV 94 08/10/2023    MCH 30.2 08/10/2023    MCHC 32.3 08/10/2023    RDW 12.9 08/10/2023     08/10/2023    MPV 10.2 08/10/2023    GRAN 2.3 08/10/2023    GRAN 59.6 08/10/2023    LYMPH 0.7 (L) 08/10/2023    LYMPH 17.3 (L) 08/10/2023    MONO 0.8 08/10/2023    MONO 21.2 (H) 08/10/2023    EOS 0.0 08/10/2023    BASO 0.03 08/10/2023    EOSINOPHIL 0.8 08/10/2023    BASOPHIL 0.8 08/10/2023     Sodium   Date Value Ref Range Status   02/10/2023 135 (L) 136 - 145 mmol/L Final     Potassium   Date Value Ref Range Status   02/10/2023 4.1 3.5 - 5.1 mmol/L Final     Chloride   Date Value Ref Range Status   02/10/2023 101 95 - 110 mmol/L Final     CO2   Date Value Ref Range Status   02/10/2023 23 23 - 29 mmol/L Final     Glucose   Date Value Ref Range Status   02/10/2023 81 70 - 110 mg/dL Final     BUN   Date Value Ref Range Status   02/10/2023 11 6 - 20 mg/dL Final     Creatinine   Date Value Ref Range Status   02/10/2023 0.6 0.5 - 1.4 mg/dL Final     Calcium   Date Value Ref Range Status   02/10/2023 9.5 8.7 - 10.5 mg/dL Final     Total Protein   Date Value Ref Range Status   08/10/2023 6.5 6.0 - 8.4 g/dL Final     Albumin   Date Value Ref Range Status   08/10/2023 3.4 (L) 3.5 - 5.2 g/dL Final     Total Bilirubin   Date Value Ref Range Status   08/10/2023 0.3 0.1 - 1.0 mg/dL Final     Comment:     For infants and newborns, interpretation of results should be based  on gestational age, weight and in agreement with  clinical  observations.    Premature Infant recommended reference ranges:  Up to 24 hours.............<8.0 mg/dL  Up to 48 hours............<12.0 mg/dL  3-5 days..................<15.0 mg/dL  6-29 days.................<15.0 mg/dL       Alkaline Phosphatase   Date Value Ref Range Status   08/10/2023 52 (L) 55 - 135 U/L Final     AST   Date Value Ref Range Status   08/10/2023 14 10 - 40 U/L Final     ALT   Date Value Ref Range Status   08/10/2023 10 10 - 44 U/L Final     Anion Gap   Date Value Ref Range Status   02/10/2023 11 8 - 16 mmol/L Final     eGFR if    Date Value Ref Range Status   03/26/2021 >60.0 >60 mL/min/1.73 m^2 Final     eGFR if non    Date Value Ref Range Status   03/26/2021 >60.0 >60 mL/min/1.73 m^2 Final     Comment:     Calculation used to obtain the estimated glomerular filtration  rate (eGFR) is the CKD-EPI equation.            MS Impression and Plan:     NEURO MULTIPLE SCLEROSIS IMPRESSION:   MS Status:     Number of relapses in the past year?:  0    Clinical Progression:  Clinically Stable  Plan:     DMT:  No change in management    DMT comment:  Continue Gilenya 5 days a week. Safety labs are due in February. She is aware of the risks associated with immunosuppressant therapy, including increased risk of infection.       Symptom Management:  Implement change in symptom management    Implement Change in Symptom Management:  Spasticity (She defers muscle relaxer for likely MS hug. She may try magnesium if she would like.)    MRI brain and spine planned for February 2024  She will follow up with Dr. Lundy in 6 months.     Total time spent with patient: 37 minutes   Total time spent on encounter: 49 minutes     Problem List Items Addressed This Visit    None  Visit Diagnoses       Multiple sclerosis    -  Primary    Relevant Orders    MRI Brain Demyelinating Without Contrast    MRI Cervical Spine Demyelinating Without Contrast    MRI Thoracic Spine Demyelinating  Without Contrast    CBC auto differential    Comprehensive Metabolic Panel              Sue Arellano, APRN, CNS

## 2023-11-13 ENCOUNTER — OFFICE VISIT (OUTPATIENT)
Dept: NEUROLOGY | Facility: CLINIC | Age: 43
End: 2023-11-13
Payer: COMMERCIAL

## 2023-11-13 VITALS
BODY MASS INDEX: 26.4 KG/M2 | SYSTOLIC BLOOD PRESSURE: 121 MMHG | HEIGHT: 66 IN | HEART RATE: 76 BPM | WEIGHT: 164.25 LBS | DIASTOLIC BLOOD PRESSURE: 84 MMHG

## 2023-11-13 DIAGNOSIS — Z29.89 PROPHYLACTIC IMMUNOTHERAPY: ICD-10-CM

## 2023-11-13 DIAGNOSIS — Z79.899 HIGH RISK MEDICATION USE: ICD-10-CM

## 2023-11-13 DIAGNOSIS — G35 MULTIPLE SCLEROSIS: Primary | ICD-10-CM

## 2023-11-13 DIAGNOSIS — Z71.89 COUNSELING REGARDING GOALS OF CARE: ICD-10-CM

## 2023-11-13 PROCEDURE — 3008F PR BODY MASS INDEX (BMI) DOCUMENTED: ICD-10-PCS | Mod: CPTII,S$GLB,, | Performed by: CLINICAL NURSE SPECIALIST

## 2023-11-13 PROCEDURE — 99999 PR PBB SHADOW E&M-EST. PATIENT-LVL IV: ICD-10-PCS | Mod: PBBFAC,,, | Performed by: CLINICAL NURSE SPECIALIST

## 2023-11-13 PROCEDURE — 3079F PR MOST RECENT DIASTOLIC BLOOD PRESSURE 80-89 MM HG: ICD-10-PCS | Mod: CPTII,S$GLB,, | Performed by: CLINICAL NURSE SPECIALIST

## 2023-11-13 PROCEDURE — 99215 PR OFFICE/OUTPT VISIT, EST, LEVL V, 40-54 MIN: ICD-10-PCS | Mod: S$GLB,,, | Performed by: CLINICAL NURSE SPECIALIST

## 2023-11-13 PROCEDURE — 3079F DIAST BP 80-89 MM HG: CPT | Mod: CPTII,S$GLB,, | Performed by: CLINICAL NURSE SPECIALIST

## 2023-11-13 PROCEDURE — 3074F PR MOST RECENT SYSTOLIC BLOOD PRESSURE < 130 MM HG: ICD-10-PCS | Mod: CPTII,S$GLB,, | Performed by: CLINICAL NURSE SPECIALIST

## 2023-11-13 PROCEDURE — 99215 OFFICE O/P EST HI 40 MIN: CPT | Mod: S$GLB,,, | Performed by: CLINICAL NURSE SPECIALIST

## 2023-11-13 PROCEDURE — 4010F ACE/ARB THERAPY RXD/TAKEN: CPT | Mod: CPTII,S$GLB,, | Performed by: CLINICAL NURSE SPECIALIST

## 2023-11-13 PROCEDURE — 4010F PR ACE/ARB THEARPY RXD/TAKEN: ICD-10-PCS | Mod: CPTII,S$GLB,, | Performed by: CLINICAL NURSE SPECIALIST

## 2023-11-13 PROCEDURE — 99999 PR PBB SHADOW E&M-EST. PATIENT-LVL IV: CPT | Mod: PBBFAC,,, | Performed by: CLINICAL NURSE SPECIALIST

## 2023-11-13 PROCEDURE — 1159F PR MEDICATION LIST DOCUMENTED IN MEDICAL RECORD: ICD-10-PCS | Mod: CPTII,S$GLB,, | Performed by: CLINICAL NURSE SPECIALIST

## 2023-11-13 PROCEDURE — 3008F BODY MASS INDEX DOCD: CPT | Mod: CPTII,S$GLB,, | Performed by: CLINICAL NURSE SPECIALIST

## 2023-11-13 PROCEDURE — 1159F MED LIST DOCD IN RCRD: CPT | Mod: CPTII,S$GLB,, | Performed by: CLINICAL NURSE SPECIALIST

## 2023-11-13 PROCEDURE — 3074F SYST BP LT 130 MM HG: CPT | Mod: CPTII,S$GLB,, | Performed by: CLINICAL NURSE SPECIALIST

## 2023-11-24 ENCOUNTER — PATIENT MESSAGE (OUTPATIENT)
Dept: NEUROLOGY | Facility: CLINIC | Age: 43
End: 2023-11-24
Payer: COMMERCIAL

## 2023-11-30 ENCOUNTER — OFFICE VISIT (OUTPATIENT)
Dept: OPHTHALMOLOGY | Facility: CLINIC | Age: 43
End: 2023-11-30
Payer: COMMERCIAL

## 2023-11-30 DIAGNOSIS — Z79.899 ON LONG TERM DRUG THERAPY: ICD-10-CM

## 2023-11-30 DIAGNOSIS — H47.293 PARTIAL OPTIC ATROPHY OF BOTH EYES: ICD-10-CM

## 2023-11-30 DIAGNOSIS — G35 MS (MULTIPLE SCLEROSIS): Primary | ICD-10-CM

## 2023-11-30 PROCEDURE — 92134 CPTRZ OPH DX IMG PST SGM RTA: CPT | Mod: S$GLB,,, | Performed by: OPHTHALMOLOGY

## 2023-11-30 PROCEDURE — 99213 PR OFFICE/OUTPT VISIT, EST, LEVL III, 20-29 MIN: ICD-10-PCS | Mod: S$GLB,,, | Performed by: OPHTHALMOLOGY

## 2023-11-30 PROCEDURE — 99999 PR PBB SHADOW E&M-EST. PATIENT-LVL III: CPT | Mod: PBBFAC,,, | Performed by: OPHTHALMOLOGY

## 2023-11-30 PROCEDURE — 4010F PR ACE/ARB THEARPY RXD/TAKEN: ICD-10-PCS | Mod: CPTII,S$GLB,, | Performed by: OPHTHALMOLOGY

## 2023-11-30 PROCEDURE — 1160F PR REVIEW ALL MEDS BY PRESCRIBER/CLIN PHARMACIST DOCUMENTED: ICD-10-PCS | Mod: CPTII,S$GLB,, | Performed by: OPHTHALMOLOGY

## 2023-11-30 PROCEDURE — 1159F PR MEDICATION LIST DOCUMENTED IN MEDICAL RECORD: ICD-10-PCS | Mod: CPTII,S$GLB,, | Performed by: OPHTHALMOLOGY

## 2023-11-30 PROCEDURE — 1160F RVW MEDS BY RX/DR IN RCRD: CPT | Mod: CPTII,S$GLB,, | Performed by: OPHTHALMOLOGY

## 2023-11-30 PROCEDURE — 99213 OFFICE O/P EST LOW 20 MIN: CPT | Mod: S$GLB,,, | Performed by: OPHTHALMOLOGY

## 2023-11-30 PROCEDURE — 99999 PR PBB SHADOW E&M-EST. PATIENT-LVL III: ICD-10-PCS | Mod: PBBFAC,,, | Performed by: OPHTHALMOLOGY

## 2023-11-30 PROCEDURE — 1159F MED LIST DOCD IN RCRD: CPT | Mod: CPTII,S$GLB,, | Performed by: OPHTHALMOLOGY

## 2023-11-30 PROCEDURE — 92134 OCT, RETINA - OU - BOTH EYES: ICD-10-PCS | Mod: S$GLB,,, | Performed by: OPHTHALMOLOGY

## 2023-11-30 PROCEDURE — 4010F ACE/ARB THERAPY RXD/TAKEN: CPT | Mod: CPTII,S$GLB,, | Performed by: OPHTHALMOLOGY

## 2023-11-30 RX ORDER — CLOTRIMAZOLE AND BETAMETHASONE DIPROPIONATE 10; .64 MG/G; MG/G
CREAM TOPICAL 2 TIMES DAILY
COMMUNITY
Start: 2023-11-24

## 2023-11-30 NOTE — PROGRESS NOTES
HPI    DLS:11/22/2022 Mohan  Patient here for annual check up MS --related to Gilenya. Review OCT  Vision stable w/correction.  No eye pain.    I have personally interviewed the patient, reviewed the history and   examined the patient and agree with the technician's exam.  Last edited by Devan Preston MD on 11/30/2023 11:45 AM.            Assessment /Plan     For exam results, see Encounter Report.    MS (multiple sclerosis)  -     OCT, Retina - OU - Both Eyes; Future    Partial optic atrophy of both eyes  -     OCT, Retina - OU - Both Eyes; Future    On long term drug therapy  -     OCT, Retina - OU - Both Eyes; Future      No signs of Gilenya  retinal toxicity. Return  one year.                    normal...

## 2023-12-23 ENCOUNTER — PATIENT MESSAGE (OUTPATIENT)
Dept: NEUROLOGY | Facility: CLINIC | Age: 43
End: 2023-12-23
Payer: COMMERCIAL

## 2023-12-23 DIAGNOSIS — G35 MULTIPLE SCLEROSIS: Primary | ICD-10-CM

## 2023-12-23 DIAGNOSIS — D84.9 IMMUNOCOMPROMISED: ICD-10-CM

## 2023-12-26 RX ORDER — NIRMATRELVIR AND RITONAVIR 300-100 MG
KIT ORAL
Qty: 30 TABLET | Refills: 0 | Status: SHIPPED | OUTPATIENT
Start: 2023-12-26 | End: 2023-12-31

## 2023-12-26 NOTE — TELEPHONE ENCOUNTER
----- Message from Lucius Hamilton sent at 12/26/2023  8:06 AM CST -----  .Type:  Needs Medical Advice    Who Called: pt    Would the patient rather a call back or a response via [facility]? Call back  Best Call Back Number: [phone]  Additional Information:     Pt stated he has Covid and would like a call back to see what medication she can get called in please

## 2023-12-26 NOTE — TELEPHONE ENCOUNTER
Med Rec completed    No significant Drug drug interactions exist besides:  Trisprintec  and Paxlovid - Use additional nonhormonal forms of contraception when estrogen-containing hormonal contraceptives are combined with nirmatrelvir and ritonavir. Continue back-up contraception until 1 menstrual cycle (approximately 28 days) after discontinuing nirmatrelvir and ritonavir to ensure contraceptive reliability.      Patient educated on above interaction. Paxlovid pended for BB signatrue

## 2024-01-05 ENCOUNTER — PATIENT MESSAGE (OUTPATIENT)
Dept: NEUROLOGY | Facility: CLINIC | Age: 44
End: 2024-01-05
Payer: COMMERCIAL

## 2024-01-22 ENCOUNTER — PATIENT MESSAGE (OUTPATIENT)
Dept: PSYCHIATRY | Facility: CLINIC | Age: 44
End: 2024-01-22
Payer: COMMERCIAL

## 2024-02-02 DIAGNOSIS — G35 MS (MULTIPLE SCLEROSIS): ICD-10-CM

## 2024-02-06 ENCOUNTER — LAB VISIT (OUTPATIENT)
Dept: LAB | Facility: HOSPITAL | Age: 44
End: 2024-02-06
Attending: CLINICAL NURSE SPECIALIST
Payer: COMMERCIAL

## 2024-02-06 DIAGNOSIS — G35 MULTIPLE SCLEROSIS: ICD-10-CM

## 2024-02-06 LAB
ALBUMIN SERPL BCP-MCNC: 3.2 G/DL (ref 3.5–5.2)
ALP SERPL-CCNC: 56 U/L (ref 55–135)
ALT SERPL W/O P-5'-P-CCNC: 19 U/L (ref 10–44)
ANION GAP SERPL CALC-SCNC: 7 MMOL/L (ref 8–16)
AST SERPL-CCNC: 23 U/L (ref 10–40)
BASOPHILS # BLD AUTO: 0.04 K/UL (ref 0–0.2)
BASOPHILS NFR BLD: 0.7 % (ref 0–1.9)
BILIRUB SERPL-MCNC: 0.3 MG/DL (ref 0.1–1)
BUN SERPL-MCNC: 12 MG/DL (ref 6–20)
CALCIUM SERPL-MCNC: 9.2 MG/DL (ref 8.7–10.5)
CHLORIDE SERPL-SCNC: 105 MMOL/L (ref 95–110)
CO2 SERPL-SCNC: 26 MMOL/L (ref 23–29)
CREAT SERPL-MCNC: 0.7 MG/DL (ref 0.5–1.4)
DIFFERENTIAL METHOD BLD: ABNORMAL
EOSINOPHIL # BLD AUTO: 0.1 K/UL (ref 0–0.5)
EOSINOPHIL NFR BLD: 0.9 % (ref 0–8)
ERYTHROCYTE [DISTWIDTH] IN BLOOD BY AUTOMATED COUNT: 13.6 % (ref 11.5–14.5)
EST. GFR  (NO RACE VARIABLE): >60 ML/MIN/1.73 M^2
GLUCOSE SERPL-MCNC: 89 MG/DL (ref 70–110)
HCT VFR BLD AUTO: 41.5 % (ref 37–48.5)
HGB BLD-MCNC: 12.9 G/DL (ref 12–16)
IMM GRANULOCYTES # BLD AUTO: 0.04 K/UL (ref 0–0.04)
IMM GRANULOCYTES NFR BLD AUTO: 0.7 % (ref 0–0.5)
LYMPHOCYTES # BLD AUTO: 0.7 K/UL (ref 1–4.8)
LYMPHOCYTES NFR BLD: 12 % (ref 18–48)
MCH RBC QN AUTO: 30.9 PG (ref 27–31)
MCHC RBC AUTO-ENTMCNC: 31.1 G/DL (ref 32–36)
MCV RBC AUTO: 100 FL (ref 82–98)
MONOCYTES # BLD AUTO: 0.9 K/UL (ref 0.3–1)
MONOCYTES NFR BLD: 15.5 % (ref 4–15)
NEUTROPHILS # BLD AUTO: 3.9 K/UL (ref 1.8–7.7)
NEUTROPHILS NFR BLD: 70.2 % (ref 38–73)
NRBC BLD-RTO: 0 /100 WBC
PLATELET # BLD AUTO: 362 K/UL (ref 150–450)
PMV BLD AUTO: 10.2 FL (ref 9.2–12.9)
POTASSIUM SERPL-SCNC: 4.2 MMOL/L (ref 3.5–5.1)
PROT SERPL-MCNC: 6.2 G/DL (ref 6–8.4)
RBC # BLD AUTO: 4.17 M/UL (ref 4–5.4)
SODIUM SERPL-SCNC: 138 MMOL/L (ref 136–145)
WBC # BLD AUTO: 5.5 K/UL (ref 3.9–12.7)

## 2024-02-06 PROCEDURE — 36415 COLL VENOUS BLD VENIPUNCTURE: CPT | Performed by: CLINICAL NURSE SPECIALIST

## 2024-02-06 PROCEDURE — 85025 COMPLETE CBC W/AUTO DIFF WBC: CPT | Performed by: CLINICAL NURSE SPECIALIST

## 2024-02-06 PROCEDURE — 80053 COMPREHEN METABOLIC PANEL: CPT | Performed by: CLINICAL NURSE SPECIALIST

## 2024-02-08 RX ORDER — FINGOLIMOD HYDROCHLORIDE 0.5 MG/1
CAPSULE ORAL
Qty: 22 CAPSULE | Refills: 5 | Status: SHIPPED | OUTPATIENT
Start: 2024-02-08 | End: 2024-03-11 | Stop reason: SDUPTHER

## 2024-02-09 ENCOUNTER — TELEPHONE (OUTPATIENT)
Dept: NEUROLOGY | Facility: CLINIC | Age: 44
End: 2024-02-09
Payer: COMMERCIAL

## 2024-02-09 NOTE — TELEPHONE ENCOUNTER
----- Message from Armida Kelsey RN sent at 2/8/2024 10:37 AM CST -----  Regarding: FW: refill request  Contact: [phone]    ----- Message -----  From: Cheryl Washington  Sent: 2/8/2024   9:18 AM CST  To: Kamron MANZANARES Staff  Subject: refill request                                                Is this a Refill or New Rx: refill         Rx Name and Strength:fingolimod (GILENYA) 0.5 mg Cap         Preferred Pharmacy with phone number:    [facility] [address] [place] [id number]  Phone: [phone] Fax: [phone]      Communication Preference:[phone]

## 2024-02-14 ENCOUNTER — TELEPHONE (OUTPATIENT)
Dept: NEUROLOGY | Facility: CLINIC | Age: 44
End: 2024-02-14
Payer: COMMERCIAL

## 2024-02-14 NOTE — TELEPHONE ENCOUNTER
----- Message from Danita Zuniga sent at 2/14/2024  4:27 PM CST -----  Regarding: RESCHEDULE  Artem, Ms. Dawn is scheduled for an MRI THORACIC SPINE DEMYELINATING WITHOUT CONTRAST 02/15/24, [facility] stated the request is pending, please reschedule this appointment to allow time to obtain an approval, Ms. Dawn has been informed of the status. Thank you

## 2024-02-15 ENCOUNTER — HOSPITAL ENCOUNTER (OUTPATIENT)
Dept: RADIOLOGY | Facility: HOSPITAL | Age: 44
Discharge: HOME | End: 2024-02-15
Attending: CLINICAL NURSE SPECIALIST
Payer: COMMERCIAL

## 2024-02-15 DIAGNOSIS — G35 MULTIPLE SCLEROSIS: ICD-10-CM

## 2024-02-15 PROCEDURE — 70551 MRI BRAIN STEM W/O DYE: CPT | Mod: TC

## 2024-02-15 PROCEDURE — 70551 MRI BRAIN STEM W/O DYE: CPT | Mod: 26,,, | Performed by: STUDENT IN AN ORGANIZED HEALTH CARE EDUCATION/TRAINING PROGRAM

## 2024-02-15 PROCEDURE — 72141 MRI NECK SPINE W/O DYE: CPT | Mod: TC

## 2024-02-15 PROCEDURE — 72141 MRI NECK SPINE W/O DYE: CPT | Mod: 26,,, | Performed by: STUDENT IN AN ORGANIZED HEALTH CARE EDUCATION/TRAINING PROGRAM

## 2024-02-15 NOTE — TELEPHONE ENCOUNTER
Spoke to pt and r/s her t-spine MRI to 3/8 at 3:45 PM at [facility]. Pt opted to keep her c-spine and brain MRI scheduled for today (2/15) at 3:00 PM.

## 2024-03-08 ENCOUNTER — HOSPITAL ENCOUNTER (OUTPATIENT)
Dept: RADIOLOGY | Facility: HOSPITAL | Age: 44
Discharge: HOME | End: 2024-03-08
Attending: CLINICAL NURSE SPECIALIST
Payer: COMMERCIAL

## 2024-03-08 ENCOUNTER — PATIENT MESSAGE (OUTPATIENT)
Dept: NEUROLOGY | Facility: CLINIC | Age: 44
End: 2024-03-08
Payer: COMMERCIAL

## 2024-03-08 DIAGNOSIS — G35 MULTIPLE SCLEROSIS: ICD-10-CM

## 2024-03-08 PROCEDURE — 72146 MRI CHEST SPINE W/O DYE: CPT | Mod: TC

## 2024-03-08 PROCEDURE — 72146 MRI CHEST SPINE W/O DYE: CPT | Mod: 26,,, | Performed by: RADIOLOGY

## 2024-03-11 ENCOUNTER — OFFICE VISIT (OUTPATIENT)
Dept: NEUROLOGY | Facility: CLINIC | Age: 44
End: 2024-03-11
Payer: COMMERCIAL

## 2024-03-11 VITALS
HEIGHT: 66 IN | BODY MASS INDEX: 26.45 KG/M2 | WEIGHT: 164.56 LBS | DIASTOLIC BLOOD PRESSURE: 84 MMHG | HEART RATE: 88 BPM | SYSTOLIC BLOOD PRESSURE: 124 MMHG

## 2024-03-11 DIAGNOSIS — Z79.899 IMMUNOSUPPRESSION DUE TO DRUG THERAPY: ICD-10-CM

## 2024-03-11 DIAGNOSIS — Z29.89 IMMUNOTHERAPY: ICD-10-CM

## 2024-03-11 DIAGNOSIS — G35 MS (MULTIPLE SCLEROSIS): Primary | ICD-10-CM

## 2024-03-11 DIAGNOSIS — D84.821 IMMUNOSUPPRESSION DUE TO DRUG THERAPY: ICD-10-CM

## 2024-03-11 DIAGNOSIS — Z71.89 COUNSELING REGARDING GOALS OF CARE: ICD-10-CM

## 2024-03-11 PROCEDURE — 1159F MED LIST DOCD IN RCRD: CPT | Mod: CPTII,S$GLB,, | Performed by: PSYCHIATRY & NEUROLOGY

## 2024-03-11 PROCEDURE — 4010F ACE/ARB THERAPY RXD/TAKEN: CPT | Mod: CPTII,S$GLB,, | Performed by: PSYCHIATRY & NEUROLOGY

## 2024-03-11 PROCEDURE — G2211 COMPLEX E/M VISIT ADD ON: HCPCS | Mod: S$GLB,,, | Performed by: PSYCHIATRY & NEUROLOGY

## 2024-03-11 PROCEDURE — 3079F DIAST BP 80-89 MM HG: CPT | Mod: CPTII,S$GLB,, | Performed by: PSYCHIATRY & NEUROLOGY

## 2024-03-11 PROCEDURE — 99999 PR PBB SHADOW E&M-EST. PATIENT-LVL V: CPT | Mod: PBBFAC,,, | Performed by: PSYCHIATRY & NEUROLOGY

## 2024-03-11 PROCEDURE — 3074F SYST BP LT 130 MM HG: CPT | Mod: CPTII,S$GLB,, | Performed by: PSYCHIATRY & NEUROLOGY

## 2024-03-11 PROCEDURE — 3008F BODY MASS INDEX DOCD: CPT | Mod: CPTII,S$GLB,, | Performed by: PSYCHIATRY & NEUROLOGY

## 2024-03-11 PROCEDURE — 99215 OFFICE O/P EST HI 40 MIN: CPT | Mod: S$GLB,,, | Performed by: PSYCHIATRY & NEUROLOGY

## 2024-03-11 PROCEDURE — 1160F RVW MEDS BY RX/DR IN RCRD: CPT | Mod: CPTII,S$GLB,, | Performed by: PSYCHIATRY & NEUROLOGY

## 2024-03-11 RX ORDER — FINGOLIMOD HYDROCHLORIDE 0.5 MG/1
CAPSULE ORAL
Qty: 22 CAPSULE | Refills: 5 | Status: SHIPPED | OUTPATIENT
Start: 2024-03-11 | End: 2024-06-06

## 2024-03-11 RX ORDER — KETOCONAZOLE 20 MG/G
CREAM TOPICAL 2 TIMES DAILY
COMMUNITY
Start: 2024-01-17

## 2024-03-11 NOTE — Clinical Note
1. We will transition from Gilenya to Briumvi;  2. Checking CBC today ; once Briumvi has insurance approval, pt will stop Gilenya, and we will check CBC 1 week later, and weekly; once ALC is 800 or greater, she should start Briumvi ASAP; one week after the second infusion, recommend 1 dose of solumedrol 1,000mg IV.

## 2024-03-11 NOTE — PROGRESS NOTES
Subjective:          Patient ID: Camila Dawn is a 43 y.o. female who presents today for a routine clinic visit for MS.      MS HPI:  DMT: fingolimod  Side effects from DMT? No   Taking vitamin D3 as recommended? Yes -   She was treated for warts on her feet this AM.  Treated by dermatologist today;   States she gets wart outbreaks 1 to 2 x per year;   Did not Modafinil this AM - so feeling tired;    Had ringworm on her face and her back  She had COVID in late December  -- was sick for 26 days.  Did take Paxlovid; prolonged head cold; better now;   She is open to DMT change    Medications:  Current Outpatient Medications   Medication Sig    alpha lipoic acid 100 mg Cap Take by mouth.    ascorbic acid, vitamin C, (VITAMIN C) 500 MG tablet Take by mouth.    azelastine (ASTELIN) 137 mcg (0.1 %) nasal spray 2 sprays 2 (two) times daily.    b complex vitamins tablet Take 1 tablet by mouth once daily.    cinnamon bark (CINNAMON ORAL) Take by mouth.    clotrimazole-betamethasone 1-0.05% (LOTRISONE) cream Apply topically 2 (two) times daily.    fexofenadine (ALLEGRA) 180 MG tablet Take 180 mg by mouth once daily.    fingolimod (GILENYA) 0.5 mg Cap TAKE 1 CAPSULE BY MOUTH ONCE  DAILY MONDAY THROUGH FRIDAY    FLAXSEED ORAL Take by mouth.    fluticasone (FLONASE) 50 mcg/actuation nasal spray 1 spray by Each Nare route once daily.    ketoconazole (NIZORAL) 2 % cream Apply topically 2 (two) times daily.    lisinopril (PRINIVIL,ZESTRIL) 40 MG tablet Take 40 mg by mouth once daily.     loratadine (CLARITIN) 10 mg tablet loratadine 10 mg tablet   Take 1 tablet every day by oral route as needed.    metformin (GLUCOPHAGE) 1000 MG tablet TK 1 T PO  BID    modafiniL (PROVIGIL) 200 MG Tab Take half tablet (100mg) in AM and half tablet (100mg) at noon.    multivitamin (THERAGRAN) per tablet Take 1 tablet by mouth once daily.    norgestimate-ethinyl estradiol (ORTHO TRI-CYCLEN,TRI-SPRINTEC) 0.18/0.215/0.25 mg-35 mcg (28) tablet  Take 1 tablet by mouth once daily.    sodium,calcium,mag,pot oxybate (XYWAV) 0.5 gram/mL Soln Take 4.5 g by mouth every evening. Takes 4.5 g PO twice nightly.    triamcinolone acetonide 0.1% (KENALOG) 0.1 % cream Apply topically continuous prn.     vitamin D 1000 units Tab Take 4,000 Units by mouth once daily.      SOCIAL HISTORY  Social History     Tobacco Use    Smoking status: Never    Smokeless tobacco: Never   Substance Use Topics    Alcohol use: Yes     Comment: rarely    Drug use: No       Living arrangements - the patient lives with their spouse.    ROS:        3/10/2024     6:00 PM   REVIEW OF SYMPTOMS   Do you feel abnormally tired on most days? No   Do you feel you generally sleep well? Yes   Do you have difficulty controlling your bladder?  No   Do you have difficulty controlling your bowels?  No   Do you have frequent muscle cramps, tightness or spasms in your limbs?  No   Do you have new visual symptoms?  No   Do you have worsening difficulty with your memory or thinking? No   Do you have worsening symptoms of anxiety or depression?  No   For patients who walk, Do you have more difficulty walking?  No   Have you fallen since your last visit?  No   For patients who use wheelchairs: Do you have any skin wounds or breakdown? Not Applicable   Do you have difficulty using your hands?  No   Do you have shooting or burning pain? No   Do you have difficulty with sexual function?  No   If you are sexually active, are you using birth control? Y/N  N/A Yes   Do you often choke when swallowing liquids or solid food?  No   Do you experience worsening symptoms when overheated? Yes   Do you need any new equipment such as a wheelchair, walker or shower chair? No   Do you receive co-pay financial assistance for your principal MS medicine? Yes   Would you be interested in participating in an MS research trial in the future? No   For patients on Gilenya, Tecfidera, Aubagio, Rituxan, Ocrevus, Tysabri, Lemtrada or  Methotrexate, are you aware that you should NOT receive live virus vaccines?  Yes   Do you feel you have adequate family/friend support?  Yes   Do you have health insurance?   Yes   Are you currently employed? No   Do you receive SSDI/SSI?  No   Do you use marijuana or cannabis products? No   Have you been diagnosed with a urinary tract infection since your last visit here? No   Have you been diagnosed with a respiratory tract infection since your last visit here? No   Have you been to the emergency room since your last visit here? No   Have you been hospitalized since your last visit here?  No                Objective:            5/8/2023    12:01 AM 11/13/2023    12:01 AM   Timed 25 Foot Walk:   Did patient wear an AFO? No No   Was assistive device used? No No   Time for 25 Foot Walk (seconds) 3.7 3.6   Time for 25 Foot Walk (seconds) 3.7 3.7         Neurologic Exam  MENTAL STATUS: grossly intact  CRANIAL NERVE EXAM: There is no internuclear ophthalmoplegia. Extraocular   muscles are intact.  No facial   asymmetry.There is no dysarthria.   MOTOR EXAM: Normal bulk and tone throughout UE and LE bilaterally. Rapid sequential movements are normal. Strength is 5/5 in all groups   in the lower extremities and upper extremities.   REFLEXES: Symmetric and 2+ throughout in all 4 extremities.   SENSORY EXAM: Normal to vibration t/o  COORDINATION: Normal finger-to-nose exam.   GAIT: deferred today b/c of warts on feet    Imaging:     Results for orders placed during the hospital encounter of 02/15/24    MRI Brain Demyelinating Without Contrast    Impression  Brain appears stable from prior exam, again demonstrating findings compatible with the reported history of multiple sclerosis.  No new discrete lesions to indicate ongoing demyelination.    Cervical cord maintains normal signal intensity and contour.  No focal cord lesions to suggest prior demyelination.    Mild cervical spondylosis as above.    Diminutive left maxillary  sinus, unchanged from comparison imaging.      Electronically signed by: Beltran Willson  Date:    02/15/2024  Time:    15:59    Results for orders placed during the hospital encounter of 02/15/24    MRI Cervical Spine Demyelinating Without Contrast    Impression  Brain appears stable from prior exam, again demonstrating findings compatible with the reported history of multiple sclerosis.  No new discrete lesions to indicate ongoing demyelination.    Cervical cord maintains normal signal intensity and contour.  No focal cord lesions to suggest prior demyelination.    Mild cervical spondylosis as above.    Diminutive left maxillary sinus, unchanged from comparison imaging.      Electronically signed by: Beltran Willson  Date:    02/15/2024  Time:    15:59    Results for orders placed during the hospital encounter of 03/08/24    MRI Thoracic Spine Demyelinating Without Contrast    Impression  No focal lesions identified in the thoracic spinal cord.      Electronically signed by: Devan Rodas MD  Date:    03/08/2024  Time:    16:10    Results for orders placed during the hospital encounter of 12/10/18    MRI Brain Demyelinating W W/O Contrast    Impression  There has been no significant change from the prior examination of 09/07/2018 with continued scattered foci of T2/FLAIR hyperintensity within the supratentorial demyelinating plaque burden.  Please note there is continued diffusion hyperintensity associated with several of the lesions as well as some T1 hypointense lesions.  No definite new lesion or enhancement to suggest active demyelination.      Electronically signed by: Laura Johansen MD  Date:    12/10/2018  Time:    12:23    No results found for this or any previous visit.    No results found for this or any previous visit.        Labs:     Lab Results   Component Value Date    MJFKEBSP52HJ 65 08/10/2023    RIRYLUYL20NO 61 03/02/2022    FHMWNUUC93NO 88 10/19/2020     Lab Results   Component Value Date    JCVINDEX  "0.20 (A) 09/17/2019    JCVANTIBODY Indeterminate (A) 09/17/2019     No results found for: "GM2INMAK", "ABSOLUTECD3", "NU0PIXGG", "ABSOLUTECD8", "DN6MSRQE", "ABSOLUTECD4", "LABCD48"  Lab Results   Component Value Date    WBC 4.07 03/14/2024    RBC 4.27 03/14/2024    HGB 13.4 03/14/2024    HCT 40.0 03/14/2024    MCV 94 03/14/2024    MCH 31.4 (H) 03/14/2024    MCHC 33.5 03/14/2024    RDW 13.3 03/14/2024     03/14/2024    MPV 10.4 03/14/2024    GRAN 3.0 03/14/2024    GRAN 72.7 03/14/2024    LYMPH 0.5 (L) 03/14/2024    LYMPH 11.8 (L) 03/14/2024    MONO 0.6 03/14/2024    MONO 14.0 03/14/2024    EOS 0.0 03/14/2024    BASO 0.02 03/14/2024    EOSINOPHIL 0.5 03/14/2024    BASOPHIL 0.5 03/14/2024     Sodium   Date Value Ref Range Status   03/14/2024 139 136 - 145 mmol/L Final     Potassium   Date Value Ref Range Status   03/14/2024 3.9 3.5 - 5.1 mmol/L Final     Chloride   Date Value Ref Range Status   03/14/2024 103 95 - 110 mmol/L Final     CO2   Date Value Ref Range Status   03/14/2024 25 23 - 29 mmol/L Final     Glucose   Date Value Ref Range Status   03/14/2024 92 70 - 110 mg/dL Final     BUN   Date Value Ref Range Status   03/14/2024 12 6 - 20 mg/dL Final     Creatinine   Date Value Ref Range Status   03/14/2024 0.7 0.5 - 1.4 mg/dL Final     Calcium   Date Value Ref Range Status   03/14/2024 9.3 8.7 - 10.5 mg/dL Final     Total Protein   Date Value Ref Range Status   03/14/2024 6.6 6.0 - 8.4 g/dL Final     Albumin   Date Value Ref Range Status   03/14/2024 3.5 3.5 - 5.2 g/dL Final     Total Bilirubin   Date Value Ref Range Status   03/14/2024 0.3 0.1 - 1.0 mg/dL Final     Comment:     For infants and newborns, interpretation of results should be based  on gestational age, weight and in agreement with clinical  observations.    Premature Infant recommended reference ranges:  Up to 24 hours.............<8.0 mg/dL  Up to 48 hours............<12.0 mg/dL  3-5 days..................<15.0 mg/dL  6-29 " days.................<15.0 mg/dL       Alkaline Phosphatase   Date Value Ref Range Status   03/14/2024 56 55 - 135 U/L Final     AST   Date Value Ref Range Status   03/14/2024 19 10 - 40 U/L Final     ALT   Date Value Ref Range Status   03/14/2024 12 10 - 44 U/L Final     Anion Gap   Date Value Ref Range Status   03/14/2024 11 8 - 16 mmol/L Final     eGFR if    Date Value Ref Range Status   03/26/2021 >60.0 >60 mL/min/1.73 m^2 Final     eGFR if non    Date Value Ref Range Status   03/26/2021 >60.0 >60 mL/min/1.73 m^2 Final     Comment:     Calculation used to obtain the estimated glomerular filtration  rate (eGFR) is the CKD-EPI equation.        Lab Results   Component Value Date    HEPBSAG Non-reactive 03/14/2024    HEPBSAB 23.44 03/14/2024    HEPBSAB Reactive 03/14/2024    HEPBCAB Non-reactive 03/14/2024           MS Impression and Plan:     NEURO MULTIPLE SCLEROSIS IMPRESSION:   Number of relapses in the past year?:  0  Clinical Progression:  Clinically Stable  MRI Progression:  Stable  MS Classification:  Relapsing-Remitting MS  DMT:  Switch Disease Modifying therapy  Switch Disease Modifying Therapy FROM:  Fingolimod  TO:  Ublituximab  Symptom Management:  No change in symptom management   After shared decision making will change DMT to Briumvi due to increased rate of infections / warts / fungi etc on Gilenya.  The rationale, side effects, risks and expectations of this medication were discussed.   Will check titer/labs today;   [facility] Clinical Pharmacist Kevin Lozada will coordinate initiation of this immunotherapy.   Once approved by insurance, pt will stop fingolimod; once ALC >800, will infuse Briumvi ASAP.  One week after second dose of Briumvi will treat with an additional 1,000mg of solumedrol to mitigate discontinuation syndrome seen with fingolimod d/c  F/u Sue Peskin [facility] in 3 mo          Problem List Items Addressed This Visit          1 - High    MS (multiple  sclerosis) - Primary    Relevant Medications    fingolimod (GILENYA) 0.5 mg Cap    Other Relevant Orders    CBC Auto Differential (Completed)    Comprehensive Metabolic Panel (Completed)    Hepatitis B Core Antibody, Total (Completed)    Hepatitis B Surface Antigen (Completed)    Hepatitis B Surface Ab, Qualitative (Completed)    Hepatitis A antibody, IgG (Completed)    Hepatitis C Antibody (Completed)    Quantiferon Gold TB (Completed)    HIV 1/2 Ag/Ab (4th Gen) (Completed)    STRONGYLOIDES IGG ANTIBODIES (Completed)    Varicella Zoster Antibody, IgG (Completed)    RPR (Completed)    Pregnancy Test Screening, Serum     Other Visit Diagnoses       Immunotherapy        Relevant Orders    CBC Auto Differential (Completed)    Comprehensive Metabolic Panel (Completed)    Hepatitis B Core Antibody, Total (Completed)    Hepatitis B Surface Antigen (Completed)    Hepatitis B Surface Ab, Qualitative (Completed)    Hepatitis A antibody, IgG (Completed)    Hepatitis C Antibody (Completed)    Quantiferon Gold TB (Completed)    HIV 1/2 Ag/Ab (4th Gen) (Completed)    STRONGYLOIDES IGG ANTIBODIES (Completed)    Varicella Zoster Antibody, IgG (Completed)    RPR (Completed)    Pregnancy Test Screening, Serum            Jazmine Lundy MD    I spent a total of 60 minutes on the day of the visit.This includes face to face time and non-face to face time preparing to see the patient (eg, review of tests), obtaining and/or reviewing separately obtained history, documenting clinical information in the electronic or other health record, independently interpreting results and communicating results to the patient/family/caregiver, or care coordinator.    Visit today included increased complexity associated with the care of the episodic problem MS addressed and managing the longitudinal care of the patient due to the serious and/or complex managed problem(s) MS.

## 2024-03-14 ENCOUNTER — LAB VISIT (OUTPATIENT)
Dept: LAB | Facility: HOSPITAL | Age: 44
End: 2024-03-14
Attending: PSYCHIATRY & NEUROLOGY
Payer: COMMERCIAL

## 2024-03-14 DIAGNOSIS — Z29.89 IMMUNOTHERAPY: ICD-10-CM

## 2024-03-14 DIAGNOSIS — G35 MS (MULTIPLE SCLEROSIS): ICD-10-CM

## 2024-03-14 LAB
ALBUMIN SERPL BCP-MCNC: 3.5 G/DL (ref 3.5–5.2)
ALP SERPL-CCNC: 56 U/L (ref 55–135)
ALT SERPL W/O P-5'-P-CCNC: 12 U/L (ref 10–44)
ANION GAP SERPL CALC-SCNC: 11 MMOL/L (ref 8–16)
AST SERPL-CCNC: 19 U/L (ref 10–40)
BASOPHILS # BLD AUTO: 0.02 K/UL (ref 0–0.2)
BASOPHILS NFR BLD: 0.5 % (ref 0–1.9)
BILIRUB SERPL-MCNC: 0.3 MG/DL (ref 0.1–1)
BUN SERPL-MCNC: 12 MG/DL (ref 6–20)
CALCIUM SERPL-MCNC: 9.3 MG/DL (ref 8.7–10.5)
CHLORIDE SERPL-SCNC: 103 MMOL/L (ref 95–110)
CO2 SERPL-SCNC: 25 MMOL/L (ref 23–29)
CREAT SERPL-MCNC: 0.7 MG/DL (ref 0.5–1.4)
DIFFERENTIAL METHOD BLD: ABNORMAL
EOSINOPHIL # BLD AUTO: 0 K/UL (ref 0–0.5)
EOSINOPHIL NFR BLD: 0.5 % (ref 0–8)
ERYTHROCYTE [DISTWIDTH] IN BLOOD BY AUTOMATED COUNT: 13.3 % (ref 11.5–14.5)
EST. GFR  (NO RACE VARIABLE): >60 ML/MIN/1.73 M^2
GLUCOSE SERPL-MCNC: 92 MG/DL (ref 70–110)
HAV IGG SER QL IA: NORMAL
HBV CORE AB SERPL QL IA: NORMAL
HBV SURFACE AB SER-ACNC: 23.44 MIU/ML
HBV SURFACE AB SER-ACNC: REACTIVE M[IU]/ML
HBV SURFACE AG SERPL QL IA: NORMAL
HCT VFR BLD AUTO: 40 % (ref 37–48.5)
HCV AB SERPL QL IA: NORMAL
HGB BLD-MCNC: 13.4 G/DL (ref 12–16)
HIV 1+2 AB+HIV1 P24 AG SERPL QL IA: NORMAL
IMM GRANULOCYTES # BLD AUTO: 0.02 K/UL (ref 0–0.04)
IMM GRANULOCYTES NFR BLD AUTO: 0.5 % (ref 0–0.5)
LYMPHOCYTES # BLD AUTO: 0.5 K/UL (ref 1–4.8)
LYMPHOCYTES NFR BLD: 11.8 % (ref 18–48)
MCH RBC QN AUTO: 31.4 PG (ref 27–31)
MCHC RBC AUTO-ENTMCNC: 33.5 G/DL (ref 32–36)
MCV RBC AUTO: 94 FL (ref 82–98)
MONOCYTES # BLD AUTO: 0.6 K/UL (ref 0.3–1)
MONOCYTES NFR BLD: 14 % (ref 4–15)
NEUTROPHILS # BLD AUTO: 3 K/UL (ref 1.8–7.7)
NEUTROPHILS NFR BLD: 72.7 % (ref 38–73)
NRBC BLD-RTO: 0 /100 WBC
PLATELET # BLD AUTO: 349 K/UL (ref 150–450)
PMV BLD AUTO: 10.4 FL (ref 9.2–12.9)
POTASSIUM SERPL-SCNC: 3.9 MMOL/L (ref 3.5–5.1)
PROT SERPL-MCNC: 6.6 G/DL (ref 6–8.4)
RBC # BLD AUTO: 4.27 M/UL (ref 4–5.4)
SODIUM SERPL-SCNC: 139 MMOL/L (ref 136–145)
WBC # BLD AUTO: 4.07 K/UL (ref 3.9–12.7)

## 2024-03-14 PROCEDURE — 36415 COLL VENOUS BLD VENIPUNCTURE: CPT | Performed by: PSYCHIATRY & NEUROLOGY

## 2024-03-14 PROCEDURE — 86682 HELMINTH ANTIBODY: CPT | Performed by: PSYCHIATRY & NEUROLOGY

## 2024-03-14 PROCEDURE — 86790 VIRUS ANTIBODY NOS: CPT | Performed by: PSYCHIATRY & NEUROLOGY

## 2024-03-14 PROCEDURE — 86480 TB TEST CELL IMMUN MEASURE: CPT | Performed by: PSYCHIATRY & NEUROLOGY

## 2024-03-14 PROCEDURE — 85025 COMPLETE CBC W/AUTO DIFF WBC: CPT | Performed by: PSYCHIATRY & NEUROLOGY

## 2024-03-14 PROCEDURE — 86592 SYPHILIS TEST NON-TREP QUAL: CPT | Performed by: PSYCHIATRY & NEUROLOGY

## 2024-03-14 PROCEDURE — 87389 HIV-1 AG W/HIV-1&-2 AB AG IA: CPT | Performed by: PSYCHIATRY & NEUROLOGY

## 2024-03-14 PROCEDURE — 87340 HEPATITIS B SURFACE AG IA: CPT | Performed by: PSYCHIATRY & NEUROLOGY

## 2024-03-14 PROCEDURE — 86704 HEP B CORE ANTIBODY TOTAL: CPT | Performed by: PSYCHIATRY & NEUROLOGY

## 2024-03-14 PROCEDURE — 80053 COMPREHEN METABOLIC PANEL: CPT | Performed by: PSYCHIATRY & NEUROLOGY

## 2024-03-14 PROCEDURE — 86803 HEPATITIS C AB TEST: CPT | Performed by: PSYCHIATRY & NEUROLOGY

## 2024-03-14 PROCEDURE — 86787 VARICELLA-ZOSTER ANTIBODY: CPT | Performed by: PSYCHIATRY & NEUROLOGY

## 2024-03-14 PROCEDURE — 86706 HEP B SURFACE ANTIBODY: CPT | Performed by: PSYCHIATRY & NEUROLOGY

## 2024-03-15 LAB
GAMMA INTERFERON BACKGROUND BLD IA-ACNC: 0.03 IU/ML
M TB IFN-G CD4+ BCKGRND COR BLD-ACNC: 0.02 IU/ML
M TB IFN-G CD4+ BCKGRND COR BLD-ACNC: 0.02 IU/ML
MITOGEN IGNF BCKGRD COR BLD-ACNC: 4.2 IU/ML
RPR SER QL: NORMAL
STRONGYLOIDES ANTIBODY IGG: NEGATIVE
TB GOLD PLUS: NEGATIVE
VARICELLA INTERPRETATION: POSITIVE
VARICELLA ZOSTER IGG: 966.3 AU/ML

## 2024-03-19 ENCOUNTER — TELEPHONE (OUTPATIENT)
Dept: NEUROLOGY | Facility: CLINIC | Age: 44
End: 2024-03-19
Payer: COMMERCIAL

## 2024-03-19 DIAGNOSIS — G35 MS (MULTIPLE SCLEROSIS): Primary | ICD-10-CM

## 2024-03-19 DIAGNOSIS — Z29.89 IMMUNOTHERAPY: ICD-10-CM

## 2024-03-19 DIAGNOSIS — D84.9 IMMUNOCOMPROMISED: ICD-10-CM

## 2024-03-19 RX ORDER — PNEUMOCOCCAL 20-VALENT CONJUGATE VACCINE 2.2; 2.2; 2.2; 2.2; 2.2; 2.2; 2.2; 2.2; 2.2; 2.2; 2.2; 2.2; 2.2; 2.2; 2.2; 2.2; 4.4; 2.2; 2.2; 2.2 UG/.5ML; UG/.5ML; UG/.5ML; UG/.5ML; UG/.5ML; UG/.5ML; UG/.5ML; UG/.5ML; UG/.5ML; UG/.5ML; UG/.5ML; UG/.5ML; UG/.5ML; UG/.5ML; UG/.5ML; UG/.5ML; UG/.5ML; UG/.5ML; UG/.5ML; UG/.5ML
0.5 INJECTION, SUSPENSION INTRAMUSCULAR ONCE
Qty: 0.5 ML | Refills: 0 | Status: SHIPPED | OUTPATIENT
Start: 2024-03-19 | End: 2024-03-19

## 2024-03-19 RX ORDER — ZOSTER VACCINE RECOMBINANT, ADJUVANTED 50 MCG/0.5
KIT INTRAMUSCULAR
Qty: 1 EACH | Refills: 1 | Status: SHIPPED | OUTPATIENT
Start: 2024-03-19

## 2024-03-19 NOTE — TELEPHONE ENCOUNTER
----- Message from Jazmine Lundy MD sent at 3/11/2024  4:03 PM CDT -----  1. We will transition from Gilenya to Briumvi;   2. Checking CBC today ; once Briumvi has insurance approval, pt will stop Gilenya, and we will check CBC 1 week later, and weekly; once ALC is 800 or greater, she should start Briumvi ASAP; one week after the second infusion, recommend 1 dose of solumedrol 1,000mg IV.

## 2024-03-19 NOTE — TELEPHONE ENCOUNTER
Briumvi new start    Labs:  4/5/24  WBC 6.85  ALC 1062    3/25/24  Hcg -  IgG  562  IgM  54  IgA 49    3/14/24  WBC 4.07    AST 19, ALT 12   HBsAg - anti-Hbc - anti-Hbs +, immune, no current or past infection  Hep C Ab  -  Hep A IgG -, not immune  HIV -  Varicella IgG, immune  Strongyloides -  TB gold -    Vaccines:  Influenza:  11/3/23  Pneumonia (Prevnar 20): One dose due now  Tetanus (TDAP): 3/28/2019, due 3/28/2029  Hepatitis B (Heplisav-B):  Immune per labs on 3/14/24  Hepatitis A (Havrix):  One dose due now, then another dose due in 6 months  Shingles (Shingrix): One dose due now, then another dose due in 2 months  Covid:  One dose due now  Respiratory Syncytial Virus (Arexvy): Not a candidate at this time    Start Form:   Faxed to [employer] [facility] on 3/21    [place]:    pt will stop fingolimod; once ALC >800, will infuse Briumvi ASAP. One week after second dose of Briumvi will treat with an additional 1,000mg of solumedrol to mitigate discontinuation syndrome seen with fingolimod d/c     Therapy plan:  Entered 3/19/24 - [facility]    Consent form:   Scanned into media

## 2024-03-25 ENCOUNTER — LAB VISIT (OUTPATIENT)
Dept: LAB | Facility: HOSPITAL | Age: 44
End: 2024-03-25
Attending: PSYCHIATRY & NEUROLOGY
Payer: COMMERCIAL

## 2024-03-25 DIAGNOSIS — D84.9 IMMUNOCOMPROMISED: ICD-10-CM

## 2024-03-25 DIAGNOSIS — G35 MS (MULTIPLE SCLEROSIS): ICD-10-CM

## 2024-03-25 DIAGNOSIS — Z29.89 IMMUNOTHERAPY: ICD-10-CM

## 2024-03-25 LAB
HCG INTACT+B SERPL-ACNC: <1.2 MIU/ML
IGA SERPL-MCNC: 49 MG/DL (ref 40–350)
IGG SERPL-MCNC: 562 MG/DL (ref 650–1600)
IGM SERPL-MCNC: 54 MG/DL (ref 50–300)

## 2024-03-25 PROCEDURE — 84702 CHORIONIC GONADOTROPIN TEST: CPT | Performed by: PSYCHIATRY & NEUROLOGY

## 2024-03-25 PROCEDURE — 82784 ASSAY IGA/IGD/IGG/IGM EACH: CPT | Mod: 59 | Performed by: PSYCHIATRY & NEUROLOGY

## 2024-03-25 PROCEDURE — 36415 COLL VENOUS BLD VENIPUNCTURE: CPT | Performed by: PSYCHIATRY & NEUROLOGY

## 2024-04-05 ENCOUNTER — LAB VISIT (OUTPATIENT)
Dept: LAB | Facility: HOSPITAL | Age: 44
End: 2024-04-05
Attending: PSYCHIATRY & NEUROLOGY
Payer: COMMERCIAL

## 2024-04-05 DIAGNOSIS — G35 MS (MULTIPLE SCLEROSIS): ICD-10-CM

## 2024-04-05 DIAGNOSIS — Z29.89 IMMUNOTHERAPY: ICD-10-CM

## 2024-04-05 DIAGNOSIS — D84.9 IMMUNOCOMPROMISED: ICD-10-CM

## 2024-04-05 LAB
BASOPHILS # BLD AUTO: 0.05 K/UL (ref 0–0.2)
BASOPHILS NFR BLD: 0.7 % (ref 0–1.9)
DIFFERENTIAL METHOD BLD: ABNORMAL
EOSINOPHIL # BLD AUTO: 0.1 K/UL (ref 0–0.5)
EOSINOPHIL NFR BLD: 1.3 % (ref 0–8)
ERYTHROCYTE [DISTWIDTH] IN BLOOD BY AUTOMATED COUNT: 13 % (ref 11.5–14.5)
HCT VFR BLD AUTO: 40.3 % (ref 37–48.5)
HGB BLD-MCNC: 13.3 G/DL (ref 12–16)
IMM GRANULOCYTES # BLD AUTO: 0.04 K/UL (ref 0–0.04)
IMM GRANULOCYTES NFR BLD AUTO: 0.6 % (ref 0–0.5)
LYMPHOCYTES # BLD AUTO: 1.1 K/UL (ref 1–4.8)
LYMPHOCYTES NFR BLD: 15.5 % (ref 18–48)
MCH RBC QN AUTO: 31.3 PG (ref 27–31)
MCHC RBC AUTO-ENTMCNC: 33 G/DL (ref 32–36)
MCV RBC AUTO: 95 FL (ref 82–98)
MONOCYTES # BLD AUTO: 0.8 K/UL (ref 0.3–1)
MONOCYTES NFR BLD: 10.9 % (ref 4–15)
NEUTROPHILS # BLD AUTO: 4.9 K/UL (ref 1.8–7.7)
NEUTROPHILS NFR BLD: 71 % (ref 38–73)
NRBC BLD-RTO: 0 /100 WBC
PLATELET # BLD AUTO: 368 K/UL (ref 150–450)
PMV BLD AUTO: 9.5 FL (ref 9.2–12.9)
RBC # BLD AUTO: 4.25 M/UL (ref 4–5.4)
WBC # BLD AUTO: 6.85 K/UL (ref 3.9–12.7)

## 2024-04-05 PROCEDURE — 36415 COLL VENOUS BLD VENIPUNCTURE: CPT | Performed by: PSYCHIATRY & NEUROLOGY

## 2024-04-05 PROCEDURE — 85025 COMPLETE CBC W/AUTO DIFF WBC: CPT | Performed by: PSYCHIATRY & NEUROLOGY

## 2024-04-08 ENCOUNTER — PATIENT MESSAGE (OUTPATIENT)
Dept: NEUROLOGY | Facility: CLINIC | Age: 44
End: 2024-04-08
Payer: COMMERCIAL

## 2024-04-11 ENCOUNTER — PATIENT MESSAGE (OUTPATIENT)
Dept: NEUROLOGY | Facility: CLINIC | Age: 44
End: 2024-04-11
Payer: COMMERCIAL

## 2024-04-11 RX ORDER — SODIUM CHLORIDE 0.9 % (FLUSH) 0.9 %
10 SYRINGE (ML) INJECTION
Status: CANCELLED | OUTPATIENT
Start: 2024-04-11

## 2024-04-11 RX ORDER — HEPARIN 100 UNIT/ML
500 SYRINGE INTRAVENOUS
Status: CANCELLED | OUTPATIENT
Start: 2024-04-11

## 2024-04-11 RX ORDER — EPINEPHRINE 0.3 MG/.3ML
0.3 INJECTION SUBCUTANEOUS
Status: CANCELLED | OUTPATIENT
Start: 2024-04-11

## 2024-04-11 RX ORDER — DIPHENHYDRAMINE HYDROCHLORIDE 50 MG/ML
50 INJECTION INTRAMUSCULAR; INTRAVENOUS
Status: CANCELLED | OUTPATIENT
Start: 2024-04-11

## 2024-04-11 RX ORDER — FAMOTIDINE 10 MG/ML
20 INJECTION INTRAVENOUS
Status: CANCELLED | OUTPATIENT
Start: 2024-04-11

## 2024-04-11 RX ORDER — ACETAMINOPHEN 500 MG
1000 TABLET ORAL
Status: CANCELLED | OUTPATIENT
Start: 2024-04-11

## 2024-05-02 ENCOUNTER — PATIENT MESSAGE (OUTPATIENT)
Dept: PSYCHIATRY | Facility: CLINIC | Age: 44
End: 2024-05-02
Payer: COMMERCIAL

## 2024-05-22 ENCOUNTER — PATIENT MESSAGE (OUTPATIENT)
Dept: NEUROLOGY | Facility: CLINIC | Age: 44
End: 2024-05-22
Payer: COMMERCIAL

## 2024-06-05 NOTE — PROGRESS NOTES
Subjective:          Patient ID: Camila Dawn is a 44 y.o. female who presents today for a routine clinic visit for MS.  She was last seen by Dr. Lundy in March. The history has been provided by the patient. She is accompanied by her .       MS HPI:  DMT: Briumvi--had initial doses on 4/12 and 4/30; due next in October.   Side effects from DMT? Yes   Taking vitamin D3 as recommended? Yes - had some flushing with the steroids; had some itchy throat with the first infusion.   She received a dose of steroids 1 week after second Briumvi infusion to mitigate Gilenya rebound.   She just saw her dermatologist yesterday. She reports that she might basal cell carcinoma on her left jaw. She has not had it biopsied yet.   She continues to struggle with ringworm, but does not have any new spots of it. She is using ketoconazole shampoo and cream to treat this.   She has been diagnosed with rosacea. The heat exacerbates the redness.   She has not seen any new warts.   She denies any infections since the last visit.   She denies any obvious new MS symptoms.   Exercise is limited because she cannot sweat due to skin issues.     Medications:  Current Outpatient Medications   Medication Sig    alpha lipoic acid 100 mg Cap Take by mouth.    ascorbic acid, vitamin C, (VITAMIN C) 500 MG tablet Take by mouth.    azelastine (ASTELIN) 137 mcg (0.1 %) nasal spray 2 sprays 2 (two) times daily.    b complex vitamins tablet Take 1 tablet by mouth once daily.    cinnamon bark (CINNAMON ORAL) Take by mouth.    clotrimazole-betamethasone 1-0.05% (LOTRISONE) cream Apply topically 2 (two) times daily.    fexofenadine (ALLEGRA) 180 MG tablet Take 180 mg by mouth once daily.    FLAXSEED ORAL Take by mouth.    fluconazole (DIFLUCAN) 150 MG Tab Take 150 mg by mouth.    fluticasone (FLONASE) 50 mcg/actuation nasal spray 1 spray by Each Nare route once daily.    ivermectin (SOOLANTRA) 1 % Crea Apply topically daily as needed.     ketoconazole (NIZORAL) 2 % cream Apply topically 2 (two) times daily.    ketoconazole (NIZORAL) 2 % shampoo Apply topically.    lisinopril (PRINIVIL,ZESTRIL) 40 MG tablet Take 40 mg by mouth once daily.     loratadine (CLARITIN) 10 mg tablet loratadine 10 mg tablet   Take 1 tablet every day by oral route as needed.    metformin (GLUCOPHAGE) 1000 MG tablet TK 1 T PO  BID    modafiniL (PROVIGIL) 200 MG Tab Take half tablet (100mg) in AM and half tablet (100mg) at noon.    multivitamin (THERAGRAN) per tablet Take 1 tablet by mouth once daily.    norgestimate-ethinyl estradiol (ORTHO TRI-CYCLEN,TRI-SPRINTEC) 0.18/0.215/0.25 mg-35 mcg (28) tablet Take 1 tablet by mouth once daily.    sodium,calcium,mag,pot oxybate (XYWAV) 0.5 gram/mL Soln Take 4.5 g by mouth every evening. Takes 4.5 g PO twice nightly.    triamcinolone acetonide 0.1% (KENALOG) 0.1 % cream Apply topically continuous prn.     varicella-zoster gE-AS01B, PF, (SHINGRIX, PF,) 50 mcg/0.5 mL injection Inject 0.5 mL into the muscle at month 0, then inject 0.5 mL into the muscle 2 months later    vitamin D 1000 units Tab Take 4,000 Units by mouth once daily.      No current facility-administered medications for this visit.       SOCIAL HISTORY  Social History     Tobacco Use    Smoking status: Never    Smokeless tobacco: Never   Substance Use Topics    Alcohol use: Yes     Comment: rarely    Drug use: No       Living arrangements - the patient lives with her      ROS:      6/5/2024     1:00 PM   REVIEW OF SYMPTOMS   Do you feel abnormally tired on most days? No--Unchanged; more tired during the summer. She has to nap sometimes. Most tired during the week of her menstrual cycle.    Do you feel you generally sleep well? Yes--takes Xywav   Do you have difficulty controlling your bladder?  No   Do you have difficulty controlling your bowels?  No   Do you have frequent muscle cramps, tightness or spasms in your limbs?  No--has had some intermittent  tightness/cramping to the bottom of her feet; doesn't last more than half a day; has high arches    Do you have new visual symptoms?  No--stable    Do you have worsening difficulty with your memory or thinking? Yes--unchanged; she manages her own medications and finances    Do you have worsening symptoms of anxiety or depression?  No--Anxiety has been high lately    For patients who walk, Do you have more difficulty walking?  No   Have you fallen since your last visit?  No   For patients who use wheelchairs: Do you have any skin wounds or breakdown? Not Applicable   Do you have difficulty using your hands?  No--unchanged    Do you have shooting or burning pain? No--back pain; she's not as active as she used to be; gets upset stomach randomly    Do you have difficulty with sexual function?  No   If you are sexually active, are you using birth control? Y/N  N/A Yes   Do you often choke when swallowing liquids or solid food?  No   Do you experience worsening symptoms when overheated? Yes   Do you need any new equipment such as a wheelchair, walker or shower chair? No   Do you receive co-pay financial assistance for your principal MS medicine? No--did not get copay assistance because her deductible was already met    Would you be interested in participating in an MS research trial in the future? No   For patients on Gilenya, Tecfidera, Aubagio, Rituxan, Ocrevus, Tysabri, Lemtrada or Methotrexate, are you aware that you should NOT receive live virus vaccines?  Discussed with patient    Do you feel you have adequate family/friend support?  Yes   Do you have health insurance?   Yes   Are you currently employed? No   Do you receive SSDI/SSI?  No   Do you use marijuana or cannabis products? No   Have you been diagnosed with a urinary tract infection since your last visit here? No   Have you been diagnosed with a respiratory tract infection since your last visit here? No   Have you been to the emergency room since your last  visit here? No   Have you been hospitalized since your last visit here?  No              Objective:        1. 25 foot timed walk:      2023    12:01 AM 2024    12:01 AM   Timed 25 Foot Walk:   Did patient wear an AFO? No No   Was assistive device used? No No   Time for 25 Foot Walk (seconds) 3.6 3.5   Time for 25 Foot Walk (seconds) 3.7 4       Neurologic Exam     Mental Status   Oriented to person, place, and time.   Attention: normal. Concentration: normal.   Speech: speech is normal   Level of consciousness: alert  Knowledge: good.   Normal comprehension.     Cranial Nerves     CN II   Visual acuity: (20/25 OD and 20/25 without correction)    CN III, IV, VI   Extraocular motions are normal.     CN V   Facial sensation intact.     CN VII   Facial expression full, symmetric.     CN VIII   Hearing: intact (to finger rub)    CN IX, X   Palate: symmetric    CN XI   CN XI normal.   Right sternocleidomastoid strength: normal  Left sternocleidomastoid strength: normal  Right trapezius strength: normal  Left trapezius strength: normal    CN XII   Tongue deviation: none    Motor Exam   Muscle bulk: normal  Overall muscle tone: normal    Strength   Right neck flexion: 5/5  Left neck flexion: 5/5  Right neck extension: 5/5  Left neck extension: 5/5  Right deltoid: 5/5  Left deltoid: 5/5  Right biceps: 5/5  Left biceps: 5/5  Right triceps: 5/5  Left triceps: 5/5  Right wrist flexion: 5/5  Left wrist flexion: 5/5  Right wrist extension: 5/5  Left wrist extension: 5/5  Right interossei: 5/5  Left interossei: 5/5  Right iliopsoas: 5/5  Left iliopsoas: 5/5  Right quadriceps: 5/5  Left quadriceps: 5/5  Right hamstrin/5  Left hamstrin/5  Right anterior tibial: 5/5  Left anterior tibial: 5/5  Right gastroc: 5/5  Left gastroc: 5/5    Sensory Exam   Right arm vibration: normal  Left arm vibration: normal  Right leg vibration: normal  Left leg vibration: normal    Gait, Coordination, and Reflexes     Gait  Gait:  normal    Coordination   Romberg: negative  Finger to nose coordination: normal  Heel to shin coordination: normal  Tandem walking coordination: normal    Tremor   Resting tremor: absent    Reflexes   Right brachioradialis: 2+  Left brachioradialis: 2+  Right biceps: 2+  Left biceps: 2+  Right triceps: 2+  Left triceps: 2+  Right patellar: 2+  Left patellar: 1+  Right achilles: 1+  Left achilles: 1+  Right plantar: equivocal  Left plantar: equivocal  She can walk on toes and heels.   Patient able to hop on each foot ten times.   Normal RSM in UE and LE          Imaging:     Results for orders placed during the hospital encounter of 02/15/24    MRI Brain Demyelinating Without Contrast    Impression  Brain appears stable from prior exam, again demonstrating findings compatible with the reported history of multiple sclerosis.  No new discrete lesions to indicate ongoing demyelination.    Cervical cord maintains normal signal intensity and contour.  No focal cord lesions to suggest prior demyelination.    Mild cervical spondylosis as above.    Diminutive left maxillary sinus, unchanged from comparison imaging.      Electronically signed by: [employer]  Date:    02/15/2024  Time:    15:59    Results for orders placed during the hospital encounter of 02/15/24    MRI Cervical Spine Demyelinating Without Contrast    Impression  Brain appears stable from prior exam, again demonstrating findings compatible with the reported history of multiple sclerosis.  No new discrete lesions to indicate ongoing demyelination.    Cervical cord maintains normal signal intensity and contour.  No focal cord lesions to suggest prior demyelination.    Mild cervical spondylosis as above.    Diminutive left maxillary sinus, unchanged from comparison imaging.      Electronically signed by: [employer]  Date:    02/15/2024  Time:    15:59    Results for orders placed during the hospital encounter of 03/08/24    MRI Thoracic Spine Demyelinating  Without Contrast    Impression  No focal lesions identified in the thoracic spinal cord.      Electronically signed by: Devan Rodas MD  Date:    03/08/2024  Time:    16:10        Labs:     Lab Results   Component Value Date    [id number] 65 08/10/2023    [id number] 61 03/02/2022    TPXHXJPE09BM 88 10/19/2020       Lab Results   Component Value Date    WBC 6.85 04/05/2024    RBC 4.25 04/05/2024    HGB 13.3 04/05/2024    HCT 40.3 04/05/2024    MCV 95 04/05/2024    MCH 31.3 (H) 04/05/2024    MCHC 33.0 04/05/2024    RDW 13.0 04/05/2024     04/05/2024    MPV 9.5 04/05/2024    GRAN 4.9 04/05/2024    GRAN 71.0 04/05/2024    LYMPH 1.1 04/05/2024    LYMPH 15.5 (L) 04/05/2024    MONO 0.8 04/05/2024    MONO 10.9 04/05/2024    EOS 0.1 04/05/2024    BASO 0.05 04/05/2024    EOSINOPHIL 1.3 04/05/2024    BASOPHIL 0.7 04/05/2024     Sodium   Date Value Ref Range Status   03/14/2024 139 136 - 145 mmol/L Final     Potassium   Date Value Ref Range Status   03/14/2024 3.9 3.5 - 5.1 mmol/L Final     Chloride   Date Value Ref Range Status   03/14/2024 103 95 - 110 mmol/L Final     CO2   Date Value Ref Range Status   03/14/2024 25 23 - 29 mmol/L Final     Glucose   Date Value Ref Range Status   03/14/2024 92 70 - 110 mg/dL Final     BUN   Date Value Ref Range Status   03/14/2024 12 6 - 20 mg/dL Final     Creatinine   Date Value Ref Range Status   03/14/2024 0.7 0.5 - 1.4 mg/dL Final     Calcium   Date Value Ref Range Status   03/14/2024 9.3 8.7 - 10.5 mg/dL Final     Total Protein   Date Value Ref Range Status   03/14/2024 6.6 6.0 - 8.4 g/dL Final     Albumin   Date Value Ref Range Status   03/14/2024 3.5 3.5 - 5.2 g/dL Final     Total Bilirubin   Date Value Ref Range Status   03/14/2024 0.3 0.1 - 1.0 mg/dL Final     Comment:     For infants and newborns, interpretation of results should be based  on gestational age, weight and in agreement with clinical  observations.    Premature Infant recommended reference ranges:  Up to  24 hours.............<8.0 mg/dL  Up to 48 hours............<12.0 mg/dL  3-5 days..................<15.0 mg/dL  6-29 days.................<15.0 mg/dL       Alkaline Phosphatase   Date Value Ref Range Status   03/14/2024 56 55 - 135 U/L Final     AST   Date Value Ref Range Status   03/14/2024 19 10 - 40 U/L Final     ALT   Date Value Ref Range Status   03/14/2024 12 10 - 44 U/L Final     Anion Gap   Date Value Ref Range Status   03/14/2024 11 8 - 16 mmol/L Final     eGFR if    Date Value Ref Range Status   03/26/2021 >60.0 >60 mL/min/1.73 m^2 Final     eGFR if non    Date Value Ref Range Status   03/26/2021 >60.0 >60 mL/min/1.73 m^2 Final     Comment:     Calculation used to obtain the estimated glomerular filtration  rate (eGFR) is the CKD-EPI equation.        Lab Results   Component Value Date    HEPBSAG Non-reactive 03/14/2024    HEPBSAB 23.44 03/14/2024    HEPBSAB Reactive 03/14/2024    HEPBCAB Non-reactive 03/14/2024       MS Impression and Plan:     NEURO MULTIPLE SCLEROSIS IMPRESSION:   Number of relapses in the past year?:  0  Clinical Progression:  Clinically Stable  MS Classification:  Relapsing-Remitting MS  DMT:  No change in management  DMT comment:  Continue Briumvi and Vit D. Her next infusion will be in October. We will check safety labs in September. She is aware of the risks associated with immunosuppressant therapy, including increased risk of infection.     Symptom Management:  No change in symptom management    She will follow up with Dr. Worthington in ophthalmology in November.   She will see Dr. Lundy in September.   The visit today is associated with current or anticipated ongoing medical care related to this patient's single serious condition/complex condition of multiple sclerosis.    Total time spent with patient: 39 minutes   Total time spent on encounter: 46 minutes           BRANDON Olivas, CNS    Problem List Items Addressed This Visit    None  Visit  Diagnoses       Multiple sclerosis    -  Primary    Relevant Orders    CBC Auto Differential    Comprehensive Metabolic Panel    Hepatitis B Core Antibody, Total    Hepatitis B Surface Antigen    Immunoglobulins (IgG, IgA, IgM) Quantitative    Vitamin D    Ambulatory referral/consult to Ophthalmology

## 2024-06-06 ENCOUNTER — OFFICE VISIT (OUTPATIENT)
Dept: NEUROLOGY | Facility: CLINIC | Age: 44
End: 2024-06-06
Payer: COMMERCIAL

## 2024-06-06 ENCOUNTER — TELEPHONE (OUTPATIENT)
Dept: OPHTHALMOLOGY | Facility: CLINIC | Age: 44
End: 2024-06-06
Payer: COMMERCIAL

## 2024-06-06 VITALS
WEIGHT: 164.81 LBS | DIASTOLIC BLOOD PRESSURE: 76 MMHG | HEIGHT: 66 IN | BODY MASS INDEX: 26.49 KG/M2 | SYSTOLIC BLOOD PRESSURE: 125 MMHG | HEART RATE: 94 BPM

## 2024-06-06 DIAGNOSIS — G35 MULTIPLE SCLEROSIS: Primary | ICD-10-CM

## 2024-06-06 DIAGNOSIS — Z79.899 HIGH RISK MEDICATION USE: ICD-10-CM

## 2024-06-06 DIAGNOSIS — Z29.89 PROPHYLACTIC IMMUNOTHERAPY: ICD-10-CM

## 2024-06-06 DIAGNOSIS — Z71.89 COUNSELING REGARDING GOALS OF CARE: ICD-10-CM

## 2024-06-06 PROCEDURE — 4010F ACE/ARB THERAPY RXD/TAKEN: CPT | Mod: CPTII,S$GLB,, | Performed by: CLINICAL NURSE SPECIALIST

## 2024-06-06 PROCEDURE — 99215 OFFICE O/P EST HI 40 MIN: CPT | Mod: S$GLB,,, | Performed by: CLINICAL NURSE SPECIALIST

## 2024-06-06 PROCEDURE — 1159F MED LIST DOCD IN RCRD: CPT | Mod: CPTII,S$GLB,, | Performed by: CLINICAL NURSE SPECIALIST

## 2024-06-06 PROCEDURE — 3078F DIAST BP <80 MM HG: CPT | Mod: CPTII,S$GLB,, | Performed by: CLINICAL NURSE SPECIALIST

## 2024-06-06 PROCEDURE — 99999 PR PBB SHADOW E&M-EST. PATIENT-LVL V: CPT | Mod: PBBFAC,,, | Performed by: CLINICAL NURSE SPECIALIST

## 2024-06-06 PROCEDURE — 3008F BODY MASS INDEX DOCD: CPT | Mod: CPTII,S$GLB,, | Performed by: CLINICAL NURSE SPECIALIST

## 2024-06-06 PROCEDURE — 3074F SYST BP LT 130 MM HG: CPT | Mod: CPTII,S$GLB,, | Performed by: CLINICAL NURSE SPECIALIST

## 2024-06-06 PROCEDURE — G2211 COMPLEX E/M VISIT ADD ON: HCPCS | Mod: S$GLB,,, | Performed by: CLINICAL NURSE SPECIALIST

## 2024-06-06 RX ORDER — FLUCONAZOLE 150 MG/1
150 TABLET ORAL
COMMUNITY
Start: 2024-05-21

## 2024-06-06 RX ORDER — KETOCONAZOLE 20 MG/ML
SHAMPOO, SUSPENSION TOPICAL
COMMUNITY
Start: 2024-05-22

## 2024-06-06 RX ORDER — IVERMECTIN 10 MG/G
CREAM TOPICAL DAILY PRN
COMMUNITY
Start: 2024-04-08

## 2024-06-06 NOTE — TELEPHONE ENCOUNTER
----- Message from Alexia Schaeffer MA sent at 6/6/2024  3:35 PM CDT -----  Good afternoon,    Per Sue Arellano's request, please schedule pt for the next available new patient appt with Dr. Worthington in November.    Thank you,  Alexia Schaeffer MA

## 2024-06-07 ENCOUNTER — TELEPHONE (OUTPATIENT)
Dept: OPHTHALMOLOGY | Facility: CLINIC | Age: 44
End: 2024-06-07
Payer: COMMERCIAL

## 2024-06-07 ENCOUNTER — PATIENT MESSAGE (OUTPATIENT)
Dept: NEUROLOGY | Facility: CLINIC | Age: 44
End: 2024-06-07
Payer: COMMERCIAL

## 2024-06-07 NOTE — TELEPHONE ENCOUNTER
----- Message from Chava Sparks sent at 6/7/2024  9:48 AM CDT -----  Contact: [phone]  Caller is requesting an appointment.  Name of Caller:Camila Dawn  Symptoms:Pt is calling to schedule her appt from referral for G35 (ICD-10-CM) - Multiple sclerosis. Please call back to further assist.  Best Call Back Number:[phone]  Additional Information:

## 2024-06-14 ENCOUNTER — TELEPHONE (OUTPATIENT)
Dept: NEUROLOGY | Facility: CLINIC | Age: 44
End: 2024-06-14

## 2024-06-14 DIAGNOSIS — G35 MULTIPLE SCLEROSIS: Primary | ICD-10-CM

## 2024-07-25 ENCOUNTER — PATIENT MESSAGE (OUTPATIENT)
Dept: PSYCHIATRY | Facility: CLINIC | Age: 44
End: 2024-07-25
Payer: COMMERCIAL

## 2024-07-30 ENCOUNTER — PATIENT MESSAGE (OUTPATIENT)
Dept: NEUROLOGY | Facility: CLINIC | Age: 44
End: 2024-07-30
Payer: COMMERCIAL

## 2024-08-05 ENCOUNTER — PATIENT MESSAGE (OUTPATIENT)
Dept: PSYCHIATRY | Facility: CLINIC | Age: 44
End: 2024-08-05
Payer: COMMERCIAL

## 2024-08-08 ENCOUNTER — TELEPHONE (OUTPATIENT)
Dept: NEUROLOGY | Facility: CLINIC | Age: 44
End: 2024-08-08
Payer: COMMERCIAL

## 2024-08-08 DIAGNOSIS — D84.9 IMMUNOCOMPROMISED: ICD-10-CM

## 2024-08-08 DIAGNOSIS — G35 MULTIPLE SCLEROSIS: Primary | ICD-10-CM

## 2024-08-08 DIAGNOSIS — Z29.89 IMMUNOTHERAPY: ICD-10-CM

## 2024-08-30 ENCOUNTER — PATIENT MESSAGE (OUTPATIENT)
Dept: NEUROLOGY | Facility: CLINIC | Age: 44
End: 2024-08-30
Payer: COMMERCIAL

## 2024-08-30 DIAGNOSIS — G35 MULTIPLE SCLEROSIS: Primary | ICD-10-CM

## 2024-09-03 ENCOUNTER — PATIENT MESSAGE (OUTPATIENT)
Dept: NEUROLOGY | Facility: CLINIC | Age: 44
End: 2024-09-03
Payer: COMMERCIAL

## 2024-09-04 ENCOUNTER — TELEPHONE (OUTPATIENT)
Dept: OPHTHALMOLOGY | Facility: CLINIC | Age: 44
End: 2024-09-04
Payer: COMMERCIAL

## 2024-09-10 ENCOUNTER — LAB VISIT (OUTPATIENT)
Dept: LAB | Facility: HOSPITAL | Age: 44
End: 2024-09-10
Attending: CLINICAL NURSE SPECIALIST
Payer: COMMERCIAL

## 2024-09-10 DIAGNOSIS — D84.9 IMMUNOCOMPROMISED: ICD-10-CM

## 2024-09-10 DIAGNOSIS — G35 MULTIPLE SCLEROSIS: ICD-10-CM

## 2024-09-10 LAB
25(OH)D3+25(OH)D2 SERPL-MCNC: 89 NG/ML (ref 30–96)
ALBUMIN SERPL BCP-MCNC: 3.2 G/DL (ref 3.5–5.2)
ALP SERPL-CCNC: 72 U/L (ref 55–135)
ALT SERPL W/O P-5'-P-CCNC: 17 U/L (ref 10–44)
ANION GAP SERPL CALC-SCNC: 11 MMOL/L (ref 8–16)
AST SERPL-CCNC: 18 U/L (ref 10–40)
BASOPHILS # BLD AUTO: 0.04 K/UL (ref 0–0.2)
BASOPHILS NFR BLD: 0.8 % (ref 0–1.9)
BILIRUB SERPL-MCNC: 0.3 MG/DL (ref 0.1–1)
BUN SERPL-MCNC: 11 MG/DL (ref 6–20)
CALCIUM SERPL-MCNC: 9.4 MG/DL (ref 8.7–10.5)
CHLORIDE SERPL-SCNC: 103 MMOL/L (ref 95–110)
CO2 SERPL-SCNC: 24 MMOL/L (ref 23–29)
CREAT SERPL-MCNC: 0.7 MG/DL (ref 0.5–1.4)
DIFFERENTIAL METHOD BLD: NORMAL
EOSINOPHIL # BLD AUTO: 0.1 K/UL (ref 0–0.5)
EOSINOPHIL NFR BLD: 1.5 % (ref 0–8)
ERYTHROCYTE [DISTWIDTH] IN BLOOD BY AUTOMATED COUNT: 12.8 % (ref 11.5–14.5)
EST. GFR  (NO RACE VARIABLE): >60 ML/MIN/1.73 M^2
GLUCOSE SERPL-MCNC: 123 MG/DL (ref 70–110)
HBV CORE AB SERPL QL IA: NORMAL
HBV SURFACE AG SERPL QL IA: NORMAL
HCG INTACT+B SERPL-ACNC: <1.2 MIU/ML
HCT VFR BLD AUTO: 38.9 % (ref 37–48.5)
HGB BLD-MCNC: 12.8 G/DL (ref 12–16)
IGA SERPL-MCNC: 31 MG/DL (ref 40–350)
IGG SERPL-MCNC: 616 MG/DL (ref 650–1600)
IGM SERPL-MCNC: 53 MG/DL (ref 50–300)
IMM GRANULOCYTES # BLD AUTO: 0.01 K/UL (ref 0–0.04)
IMM GRANULOCYTES NFR BLD AUTO: 0.2 % (ref 0–0.5)
LYMPHOCYTES # BLD AUTO: 1.1 K/UL (ref 1–4.8)
LYMPHOCYTES NFR BLD: 20.9 % (ref 18–48)
MCH RBC QN AUTO: 30.4 PG (ref 27–31)
MCHC RBC AUTO-ENTMCNC: 32.9 G/DL (ref 32–36)
MCV RBC AUTO: 92 FL (ref 82–98)
MONOCYTES # BLD AUTO: 0.7 K/UL (ref 0.3–1)
MONOCYTES NFR BLD: 13.2 % (ref 4–15)
NEUTROPHILS # BLD AUTO: 3.4 K/UL (ref 1.8–7.7)
NEUTROPHILS NFR BLD: 63.4 % (ref 38–73)
NRBC BLD-RTO: 0 /100 WBC
PLATELET # BLD AUTO: 343 K/UL (ref 150–450)
PMV BLD AUTO: 10 FL (ref 9.2–12.9)
POTASSIUM SERPL-SCNC: 3.6 MMOL/L (ref 3.5–5.1)
PROT SERPL-MCNC: 6.6 G/DL (ref 6–8.4)
RBC # BLD AUTO: 4.21 M/UL (ref 4–5.4)
SODIUM SERPL-SCNC: 138 MMOL/L (ref 136–145)
WBC # BLD AUTO: 5.31 K/UL (ref 3.9–12.7)

## 2024-09-10 PROCEDURE — 82784 ASSAY IGA/IGD/IGG/IGM EACH: CPT | Performed by: CLINICAL NURSE SPECIALIST

## 2024-09-10 PROCEDURE — 80053 COMPREHEN METABOLIC PANEL: CPT | Performed by: CLINICAL NURSE SPECIALIST

## 2024-09-10 PROCEDURE — 85025 COMPLETE CBC W/AUTO DIFF WBC: CPT | Performed by: CLINICAL NURSE SPECIALIST

## 2024-09-10 PROCEDURE — 87340 HEPATITIS B SURFACE AG IA: CPT | Performed by: CLINICAL NURSE SPECIALIST

## 2024-09-10 PROCEDURE — 86704 HEP B CORE ANTIBODY TOTAL: CPT | Performed by: CLINICAL NURSE SPECIALIST

## 2024-09-10 PROCEDURE — 84702 CHORIONIC GONADOTROPIN TEST: CPT | Performed by: PSYCHIATRY & NEUROLOGY

## 2024-09-10 PROCEDURE — 36415 COLL VENOUS BLD VENIPUNCTURE: CPT | Performed by: CLINICAL NURSE SPECIALIST

## 2024-09-10 PROCEDURE — 82306 VITAMIN D 25 HYDROXY: CPT | Performed by: CLINICAL NURSE SPECIALIST

## 2024-09-25 ENCOUNTER — HOSPITAL ENCOUNTER (OUTPATIENT)
Dept: RADIOLOGY | Facility: HOSPITAL | Age: 44
Discharge: HOME | End: 2024-09-25
Attending: CLINICAL NURSE SPECIALIST
Payer: COMMERCIAL

## 2024-09-25 DIAGNOSIS — G35 MULTIPLE SCLEROSIS: ICD-10-CM

## 2024-09-25 PROCEDURE — 70553 MRI BRAIN STEM W/O & W/DYE: CPT | Mod: TC

## 2024-09-25 PROCEDURE — A9585 GADOBUTROL INJECTION: HCPCS | Performed by: CLINICAL NURSE SPECIALIST

## 2024-09-25 PROCEDURE — 25500020 PHARM REV CODE 255: Performed by: CLINICAL NURSE SPECIALIST

## 2024-09-25 PROCEDURE — 70553 MRI BRAIN STEM W/O & W/DYE: CPT | Mod: 26,,, | Performed by: RADIOLOGY

## 2024-09-25 RX ORDER — GADOBUTROL 604.72 MG/ML
8 INJECTION INTRAVENOUS
Status: COMPLETED | OUTPATIENT
Start: 2024-09-25 | End: 2024-09-25

## 2024-09-25 RX ADMIN — GADOBUTROL 8 ML: 604.72 INJECTION INTRAVENOUS at 02:09

## 2024-09-30 ENCOUNTER — LAB VISIT (OUTPATIENT)
Dept: LAB | Facility: HOSPITAL | Age: 44
End: 2024-09-30
Attending: PSYCHIATRY & NEUROLOGY
Payer: COMMERCIAL

## 2024-09-30 ENCOUNTER — OFFICE VISIT (OUTPATIENT)
Dept: NEUROLOGY | Facility: CLINIC | Age: 44
End: 2024-09-30
Payer: COMMERCIAL

## 2024-09-30 VITALS
BODY MASS INDEX: 27.17 KG/M2 | SYSTOLIC BLOOD PRESSURE: 123 MMHG | WEIGHT: 169.06 LBS | HEIGHT: 66 IN | DIASTOLIC BLOOD PRESSURE: 84 MMHG

## 2024-09-30 DIAGNOSIS — G35 MS (MULTIPLE SCLEROSIS): ICD-10-CM

## 2024-09-30 DIAGNOSIS — B07.0 PLANTAR WARTS: ICD-10-CM

## 2024-09-30 DIAGNOSIS — B07.8 OTHER VIRAL WARTS: ICD-10-CM

## 2024-09-30 DIAGNOSIS — Z29.89 IMMUNOTHERAPY: ICD-10-CM

## 2024-09-30 DIAGNOSIS — D84.9 IMMUNOCOMPROMISED: ICD-10-CM

## 2024-09-30 DIAGNOSIS — G35 MS (MULTIPLE SCLEROSIS): Primary | ICD-10-CM

## 2024-09-30 DIAGNOSIS — Z29.89 PROPHYLACTIC IMMUNOTHERAPY: ICD-10-CM

## 2024-09-30 DIAGNOSIS — Z71.89 COUNSELING REGARDING GOALS OF CARE: ICD-10-CM

## 2024-09-30 PROCEDURE — 86360 T CELL ABSOLUTE COUNT/RATIO: CPT | Performed by: PSYCHIATRY & NEUROLOGY

## 2024-09-30 PROCEDURE — 99215 OFFICE O/P EST HI 40 MIN: CPT | Mod: S$GLB,,, | Performed by: PSYCHIATRY & NEUROLOGY

## 2024-09-30 PROCEDURE — G2211 COMPLEX E/M VISIT ADD ON: HCPCS | Mod: S$GLB,,, | Performed by: PSYCHIATRY & NEUROLOGY

## 2024-09-30 PROCEDURE — 1160F RVW MEDS BY RX/DR IN RCRD: CPT | Mod: CPTII,S$GLB,, | Performed by: PSYCHIATRY & NEUROLOGY

## 2024-09-30 PROCEDURE — 3074F SYST BP LT 130 MM HG: CPT | Mod: CPTII,S$GLB,, | Performed by: PSYCHIATRY & NEUROLOGY

## 2024-09-30 PROCEDURE — 4010F ACE/ARB THERAPY RXD/TAKEN: CPT | Mod: CPTII,S$GLB,, | Performed by: PSYCHIATRY & NEUROLOGY

## 2024-09-30 PROCEDURE — 99999 PR PBB SHADOW E&M-EST. PATIENT-LVL V: CPT | Mod: PBBFAC,,, | Performed by: PSYCHIATRY & NEUROLOGY

## 2024-09-30 PROCEDURE — 86359 T CELLS TOTAL COUNT: CPT | Performed by: PSYCHIATRY & NEUROLOGY

## 2024-09-30 PROCEDURE — 1159F MED LIST DOCD IN RCRD: CPT | Mod: CPTII,S$GLB,, | Performed by: PSYCHIATRY & NEUROLOGY

## 2024-09-30 PROCEDURE — 3079F DIAST BP 80-89 MM HG: CPT | Mod: CPTII,S$GLB,, | Performed by: PSYCHIATRY & NEUROLOGY

## 2024-09-30 PROCEDURE — 3008F BODY MASS INDEX DOCD: CPT | Mod: CPTII,S$GLB,, | Performed by: PSYCHIATRY & NEUROLOGY

## 2024-09-30 NOTE — Clinical Note
Franklyn Neal recommend I refer this pt to you.  She's immunocompromised from medication for MS, and has had problem with cutaneous warts (not genital) for years.  Would you be willing to see her? Thanks

## 2024-09-30 NOTE — PROGRESS NOTES
Subjective:          Patient ID: Camila Dawn is a 44 y.o. female who presents today for a routine clinic visit for MS.      MS HPI:  DMT: Briumvi  Side effects from DMT? Yes - mild infusion reaction  Taking vitamin D3 as recommended? yes   Had actinic keratoses next to her left ear, treated with a cream, it's healed up, and looks much better.   Warts are still happening.  They continue to come  Ringworm has improved  Feeling somewhat depressed and anxious; she's in therapy; needs to find a new therapist; not interested in meds at this time;   She's not been biking or exercising much     Medications:  Current Outpatient Medications   Medication Sig    alpha lipoic acid 100 mg Cap Take by mouth.    ascorbic acid, vitamin C, (VITAMIN C) 500 MG tablet Take by mouth.    azelastine (ASTELIN) 137 mcg (0.1 %) nasal spray 2 sprays 2 (two) times daily.    b complex vitamins tablet Take 1 tablet by mouth once daily.    cinnamon bark (CINNAMON ORAL) Take by mouth.    clotrimazole-betamethasone 1-0.05% (LOTRISONE) cream Apply topically 2 (two) times daily.    fexofenadine (ALLEGRA) 180 MG tablet Take 180 mg by mouth once daily.    FLAXSEED ORAL Take by mouth.    fluticasone (FLONASE) 50 mcg/actuation nasal spray 1 spray by Each Nare route once daily.    ivermectin (SOOLANTRA) 1 % Crea Apply topically daily as needed.    ketoconazole (NIZORAL) 2 % cream Apply topically 2 (two) times daily.    ketoconazole (NIZORAL) 2 % shampoo Apply topically.    lisinopril (PRINIVIL,ZESTRIL) 40 MG tablet Take 40 mg by mouth once daily.     loratadine (CLARITIN) 10 mg tablet loratadine 10 mg tablet   Take 1 tablet every day by oral route as needed.    metformin (GLUCOPHAGE) 1000 MG tablet TK 1 T PO  BID    modafiniL (PROVIGIL) 200 MG Tab Take half tablet (100mg) in AM and half tablet (100mg) at noon.    multivitamin (THERAGRAN) per tablet Take 1 tablet by mouth once daily.    norgestimate-ethinyl estradiol (ORTHO  TRI-CYCLEN,TRI-SPRINTEC) 0.18/0.215/0.25 mg-35 mcg (28) tablet Take 1 tablet by mouth once daily.    sodium,calcium,mag,pot oxybate (XYWAV) 0.5 gram/mL Soln Take 4.5 g by mouth every evening. Takes 4.5 g PO twice nightly.    triamcinolone acetonide 0.1% (KENALOG) 0.1 % cream Apply topically continuous prn.     varicella-zoster gE-AS01B, PF, (SHINGRIX, PF,) 50 mcg/0.5 mL injection Inject 0.5 mL into the muscle at month 0, then inject 0.5 mL into the muscle 2 months later    vitamin D 1000 units Tab Take 4,000 Units by mouth once daily.     fluconazole (DIFLUCAN) 150 MG Tab Take 150 mg by mouth. (Patient not taking: Reported on 9/30/2024)     No current facility-administered medications for this visit.       SOCIAL HISTORY  Social History     Tobacco Use    Smoking status: Never    Smokeless tobacco: Never   Substance Use Topics    Alcohol use: Yes     Comment: rarely    Drug use: No       Living arrangements - the patient lives with their spouse.        9/29/2024     3:23 PM   REVIEW OF SYMPTOMS   Do you feel abnormally tired on most days? No    Do you feel you generally sleep well? Yes    Do you have difficulty controlling your bladder?  No    Do you have difficulty controlling your bowels?  No    Do you have frequent muscle cramps, tightness or spasms in your limbs?  No    Do you have new visual symptoms?  No    Do you have worsening difficulty with your memory or thinking? No    Do you have worsening symptoms of anxiety or depression?  No    For patients who walk, Do you have more difficulty walking?  No    Have you fallen since your last visit?  No    For patients who use wheelchairs: Do you have any skin wounds or breakdown? Not Applicable    Do you have difficulty using your hands?  No    Do you have shooting or burning pain? No    Do you have difficulty with sexual function?  No    If you are sexually active, are you using birth control? Y/N  N/A Yes    Do you often choke when swallowing liquids or solid food?   No    Do you experience worsening symptoms when overheated? Yes    Do you need any new equipment such as a wheelchair, walker or shower chair? No    Do you receive co-pay financial assistance for your principal MS medicine? No    Would you be interested in participating in an MS research trial in the future? No    For patients on Gilenya, Tecfidera, Aubagio, Rituxan, Ocrevus, Tysabri, Lemtrada or Methotrexate, are you aware that you should NOT receive live virus vaccines?  Not Applicable    Do you feel you have adequate family/friend support?  Yes    Do you have health insurance?   Yes    Are you currently employed? No    Do you receive SSDI/SSI?  No    Do you use marijuana or cannabis products? No    Have you been diagnosed with a urinary tract infection since your last visit here? No    Have you been diagnosed with a respiratory tract infection since your last visit here? No    Have you been to the emergency room since your last visit here? No    Have you been hospitalized since your last visit here?  No        Patient-reported            Objective:            6/6/2024     3:29 PM 9/30/2024     3:43 PM   Timed 25 Foot Walk:   Did patient wear an AFO? No No   Was assistive device used? No No   Time for 25 Foot Walk (seconds) 3.5 4.1   Time for 25 Foot Walk (seconds) 4 4.2     Neurological Exam  MENTAL STATUS: grossly intact  CRANIAL NERVE EXAM: There is no internuclear ophthalmoplegia. Extraocular   muscles are intact.  No facial   asymmetry.There is no dysarthria.   MOTOR EXAM: Normal bulk and tone throughout UE and LE bilaterally. Rapid sequential movements are normal. Strength is 5/5 in all groups   in the lower extremities and upper extremities.   REFLEXES: Symmetric and 2+ throughout in all 4 extremities.   SENSORY EXAM: Normal to vibration t/o  COORDINATION: Normal finger-to-nose exam.   GAIT: Narrow based and stable.    Imaging:     Results for orders placed during the hospital encounter of 02/15/24    MRI  Brain Demyelinating Without Contrast    Impression  Brain appears stable from prior exam, again demonstrating findings compatible with the reported history of multiple sclerosis.  No new discrete lesions to indicate ongoing demyelination.    Cervical cord maintains normal signal intensity and contour.  No focal cord lesions to suggest prior demyelination.    Mild cervical spondylosis as above.    Diminutive left maxillary sinus, unchanged from comparison imaging.      Electronically signed by: Beltran Willson  Date:    02/15/2024  Time:    15:59    Results for orders placed during the hospital encounter of 02/15/24    MRI Cervical Spine Demyelinating Without Contrast    Impression  Brain appears stable from prior exam, again demonstrating findings compatible with the reported history of multiple sclerosis.  No new discrete lesions to indicate ongoing demyelination.    Cervical cord maintains normal signal intensity and contour.  No focal cord lesions to suggest prior demyelination.    Mild cervical spondylosis as above.    Diminutive left maxillary sinus, unchanged from comparison imaging.      Electronically signed by: eBltran Willson  Date:    02/15/2024  Time:    15:59    Results for orders placed during the hospital encounter of 03/08/24    MRI Thoracic Spine Demyelinating Without Contrast    Impression  No focal lesions identified in the thoracic spinal cord.      Electronically signed by: Devan Rodas MD  Date:    03/08/2024  Time:    16:10    Results for orders placed during the hospital encounter of 09/25/24    MRI Brain Demyelinating W W/O Contrast    Impression  No significant change from prior continued scattered T2 FLAIR hyperintense lesions throughout the brain parenchyma while nonspecific configuration remains concerning for mild moderate degree of prior demyelinating plaque    No definite new lesion or enhancing lesion to suggest significant interval or active demyelination    Clinical correlation and  "continued follow-up advised.    Electronically signed by resident: Alistair Garcia  Date:    09/25/2024  Time:    14:30    Electronically signed by: Frederic Escobedo DO  Date:    09/25/2024  Time:    16:00    No results found for this or any previous visit.    No results found for this or any previous visit.        Labs:     Lab Results   Component Value Date    YAPZTHCV04YV 89 09/10/2024    BJAHTZXF60TV 65 08/10/2023    STQPBSQM95SP 61 03/02/2022     Lab Results   Component Value Date    JCVINDEX 0.20 (A) 09/17/2019    JCVANTIBODY Indeterminate (A) 09/17/2019     No results found for: "AC7OKICO", "ABSOLUTECD3", "IV5CUMSM", "ABSOLUTECD8", "DY8UTANV", "ABSOLUTECD4", "LABCD48"  Lab Results   Component Value Date    WBC 5.31 09/10/2024    RBC 4.21 09/10/2024    HGB 12.8 09/10/2024    HCT 38.9 09/10/2024    MCV 92 09/10/2024    MCH 30.4 09/10/2024    MCHC 32.9 09/10/2024    RDW 12.8 09/10/2024     09/10/2024    MPV 10.0 09/10/2024    GRAN 3.4 09/10/2024    GRAN 63.4 09/10/2024    LYMPH 1.1 09/10/2024    LYMPH 20.9 09/10/2024    MONO 0.7 09/10/2024    MONO 13.2 09/10/2024    EOS 0.1 09/10/2024    BASO 0.04 09/10/2024    EOSINOPHIL 1.5 09/10/2024    BASOPHIL 0.8 09/10/2024     Sodium   Date Value Ref Range Status   09/10/2024 138 136 - 145 mmol/L Final     Potassium   Date Value Ref Range Status   09/10/2024 3.6 3.5 - 5.1 mmol/L Final     Chloride   Date Value Ref Range Status   09/10/2024 103 95 - 110 mmol/L Final     CO2   Date Value Ref Range Status   09/10/2024 24 23 - 29 mmol/L Final     Glucose   Date Value Ref Range Status   09/10/2024 123 (H) 70 - 110 mg/dL Final     BUN   Date Value Ref Range Status   09/10/2024 11 6 - 20 mg/dL Final     Creatinine   Date Value Ref Range Status   09/10/2024 0.7 0.5 - 1.4 mg/dL Final     Calcium   Date Value Ref Range Status   09/10/2024 9.4 8.7 - 10.5 mg/dL Final     Total Protein   Date Value Ref Range Status   09/10/2024 6.6 6.0 - 8.4 g/dL Final     Albumin   Date Value Ref " Range Status   09/10/2024 3.2 (L) 3.5 - 5.2 g/dL Final     Total Bilirubin   Date Value Ref Range Status   09/10/2024 0.3 0.1 - 1.0 mg/dL Final     Comment:     For infants and newborns, interpretation of results should be based  on gestational age, weight and in agreement with clinical  observations.    Premature Infant recommended reference ranges:  Up to 24 hours.............<8.0 mg/dL  Up to 48 hours............<12.0 mg/dL  3-5 days..................<15.0 mg/dL  6-29 days.................<15.0 mg/dL       Alkaline Phosphatase   Date Value Ref Range Status   09/10/2024 72 55 - 135 U/L Final     AST   Date Value Ref Range Status   09/10/2024 18 10 - 40 U/L Final     ALT   Date Value Ref Range Status   09/10/2024 17 10 - 44 U/L Final     Anion Gap   Date Value Ref Range Status   09/10/2024 11 8 - 16 mmol/L Final     eGFR if    Date Value Ref Range Status   03/26/2021 >60.0 >60 mL/min/1.73 m^2 Final     eGFR if non    Date Value Ref Range Status   03/26/2021 >60.0 >60 mL/min/1.73 m^2 Final     Comment:     Calculation used to obtain the estimated glomerular filtration  rate (eGFR) is the CKD-EPI equation.        Lab Results   Component Value Date    HEPBSAG Non-reactive 09/10/2024    HEPBSAB 23.44 03/14/2024    HEPBSAB Reactive 03/14/2024    HEPBCAB Non-reactive 09/10/2024           MS Impression and Plan:     NEURO MULTIPLE SCLEROSIS IMPRESSION:   Number of relapses in the past year?:  0  Clinical Progression:  Clinically Stable  MRI Progression:  Stable  MS Classification:  Relapsing-Remitting MS  DMT:  No change in management  Symptom Management:  No change in symptom management   Clinically stable  Tolerating Briumvi  Check T cells   Still having warts on fingers and feet - was initially improved off Gilenya  Will refer to [facility], Dr. Simon   F/u 4 mo with me VV             Problem List Items Addressed This Visit          1 - High    MS (multiple sclerosis) -  Primary    Relevant Orders    [employer]-Suppressor Ratio     Other Visit Diagnoses       Immunotherapy        Relevant Orders    [employer]-Suppressor Ratio    Immunocompromised        Relevant Orders    Ambulatory consult to Dermatology    Plantar warts        Relevant Orders    Ambulatory consult to Dermatology    Other viral warts        Relevant Orders    Ambulatory consult to Dermatology    Prophylactic immunotherapy        Counseling regarding goals of care                Jazmine Lundy MD    I spent a total of 40 minutes on the day of the visit.This includes face to face time and non-face to face time preparing to see the patient (eg, review of tests), obtaining and/or reviewing separately obtained history, documenting clinical information in the electronic or other health record, independently interpreting results and communicating results to the patient/family/caregiver, or care coordinator.  Visit today included increased complexity associated with the care of the episodic problem: chronic immunotherapy addressed and managing the longitudinal care of the patient due to the serious and/or complex managed problem(s) MS.

## 2024-10-01 ENCOUNTER — PATIENT MESSAGE (OUTPATIENT)
Dept: NEUROLOGY | Facility: CLINIC | Age: 44
End: 2024-10-01
Payer: COMMERCIAL

## 2024-10-01 ENCOUNTER — TELEPHONE (OUTPATIENT)
Dept: DERMATOLOGY | Facility: CLINIC | Age: 44
End: 2024-10-01
Payer: COMMERCIAL

## 2024-10-01 LAB
ABSOLUTE CD3: 1839 CELLS/UL (ref 700–2100)
ABSOLUTE CD8: 1260 CELLS/UL (ref 200–900)
CD3%: 90.3 % (ref 55–83)
CD3+CD4+ CELLS # BLD: 572 CELLS/UL (ref 300–1400)
CD3+CD4+ CELLS NFR BLD: 28.1 % (ref 28–57)
CD4/CD8 RATIO: 0.45 (ref 0.9–3.6)
CD8 % SUPPRESSOR T CELL: 61.9 % (ref 10–39)

## 2024-10-01 NOTE — TELEPHONE ENCOUNTER
----- Message from Efren Antonio PA-C sent at 10/1/2024 10:08 AM CDT -----  Appointment with me for warts. Thanks!    -Efren  ----- Message -----  From: Estela Schwartz MD  Sent: 10/1/2024   8:13 AM CDT  To: Vanessa Lam RN; Efren Antonio PA-C    I believe Efren treats warts!  ----- Message -----  From: Paige Simon MD  Sent: 9/30/2024   7:53 PM CDT  To: Jazmine Lundy MD; Kirsty Dinero MD; #    Dr. Lundy,  One of our dermatopathologists just went out today on paternity leave, so my clinic availability is almost zero until he gets back after Nov. 10.  I am sending this to the rest of my colleagues at [facility], and we will work her in.  Vanessa, could you look at [facility] derm schedules to facilitate?    Jerald,  Paige  ----- Message -----  From: Jazmine Lundy MD  Sent: 9/30/2024   4:59 PM CDT  To: Paige Simon MD    Hi Franklyn Delvalle recommend I refer this pt to you.  She's immunocompromised from medication for MS, and has had problem with cutaneous warts (not genital) for years.  Would you be willing to see her? Thanks

## 2024-10-02 ENCOUNTER — TELEPHONE (OUTPATIENT)
Dept: DERMATOLOGY | Facility: CLINIC | Age: 44
End: 2024-10-02
Payer: COMMERCIAL

## 2024-10-03 ENCOUNTER — PATIENT MESSAGE (OUTPATIENT)
Dept: NEUROLOGY | Facility: CLINIC | Age: 44
End: 2024-10-03
Payer: COMMERCIAL

## 2024-10-11 ENCOUNTER — OFFICE VISIT (OUTPATIENT)
Dept: DERMATOLOGY | Facility: CLINIC | Age: 44
End: 2024-10-11
Payer: COMMERCIAL

## 2024-10-11 DIAGNOSIS — B07.9 VERRUCA VULGARIS: Primary | ICD-10-CM

## 2024-10-11 PROCEDURE — 99999 PR PBB SHADOW E&M-EST. PATIENT-LVL IV: CPT | Mod: PBBFAC,,, | Performed by: STUDENT IN AN ORGANIZED HEALTH CARE EDUCATION/TRAINING PROGRAM

## 2024-10-11 NOTE — PROGRESS NOTES
Subjective:      Patient ID:  Camila Dawn is a 44 y.o. female who presents for   Chief Complaint   Patient presents with    Warts     Feet     Warts - Initial  Affected locations: left foot and right foot  Duration: 4 years- comes and goes every couple months.  Treatments tried: Seen by podiatry and treated with chemical 3 weeks ago.      Has had chronic warts on feet and hands treated by her podiatrist and dermatologist    They treat with LN2 on hands and cantherone on feet. Her main concern is why she continues to get new lesions and if there is something she can do to prevent them    Has MS. On immunosuppression    Review of Systems   Hematologic/Lymphatic: Bruises/bleeds easily.       Objective:   Physical Exam   Constitutional: She appears well-developed and well-nourished. No distress.   Neurological: She is alert and oriented to person, place, and time. She is not disoriented.   Psychiatric: She has a normal mood and affect.   Skin:   Areas Examined (abnormalities noted in diagram):   RUE Inspected  LUE Inspection Performed  RLE Inspected  LLE Inspection Performed                Diagram Legend     Erythematous scaling macule/papule c/w actinic keratosis       Vascular papule c/w angioma      Pigmented verrucoid papule/plaque c/w seborrheic keratosis      Yellow umbilicated papule c/w sebaceous hyperplasia      Irregularly shaped tan macule c/w lentigo     1-2 mm smooth white papules consistent with Milia      Movable subcutaneous cyst with punctum c/w epidermal inclusion cyst      Subcutaneous movable cyst c/w pilar cyst      Firm pink to brown papule c/w dermatofibroma      Pedunculated fleshy papule(s) c/w skin tag(s)      Evenly pigmented macule c/w junctional nevus     Mildly variegated pigmented, slightly irregular-bordered macule c/w mildly atypical nevus      Flesh colored to evenly pigmented papule c/w intradermal nevus       Pink pearly papule/plaque c/w basal cell carcinoma       Erythematous hyperkeratotic cursted plaque c/w SCC      Surgical scar with no sign of skin cancer recurrence      Open and closed comedones      Inflammatory papules and pustules      Verrucoid papule consistent consistent with wart     Erythematous eczematous patches and plaques     Dystrophic onycholytic nail with subungual debris c/w onychomycosis     Umbilicated papule    Erythematous-base heme-crusted tan verrucoid plaque consistent with inflamed seborrheic keratosis     Erythematous Silvery Scaling Plaque c/w Psoriasis     See annotation      Assessment / Plan:        Verruca vulgaris    Patient had prior episode of plantar warts that required treatment and was painful. Since then, she closely examines her hands and feet to catch warts early and has them treated by podiatry and dermatology. Today, she had one small plantar wart on left heel. She had about 10 areas recently treated and healing without residual warts on these locations so I am unable to determine what the initial lesions were. Typically palmoplantar warts require multiple rounds of treatment to resolve and very small early warts can clinically be difficult to distinguish from other hyperkeratotic lesions on hands and feet. Possibly punctate PPK? Vs recurrent warts. If they are recurrent warts, then possibly due to immunosuppression, but there is nothing that can be used as prevention or prophylaxis. The lesions resolve with appropriate treatment so would recommend continuing to follow with her current providers to treat warts with cantherone or LN2 as they arise. One lesion treated with LN 2 today    Cryosurgery procedure note:  Verbal consent from the patient is obtained including, but not limited to, risk of hypopigmentation/hyperpigmentation, scar, recurrence of lesion. Liquid nitrogen cryosurgery is applied to 1 verruca with prior paring, as detailed in the physical exam, to produce a freeze injury. 3 consecutive freeze thaw cycles are  applied to each verruca. The patient is aware that blisters (possibly blood blisters) may form.

## 2024-10-11 NOTE — PATIENT INSTRUCTIONS

## 2024-12-10 ENCOUNTER — PATIENT MESSAGE (OUTPATIENT)
Dept: PSYCHIATRY | Facility: CLINIC | Age: 44
End: 2024-12-10
Payer: COMMERCIAL

## 2024-12-16 ENCOUNTER — PATIENT MESSAGE (OUTPATIENT)
Dept: PSYCHIATRY | Facility: CLINIC | Age: 44
End: 2024-12-16
Payer: COMMERCIAL

## 2025-01-07 ENCOUNTER — PATIENT MESSAGE (OUTPATIENT)
Dept: NEUROLOGY | Facility: CLINIC | Age: 45
End: 2025-01-07
Payer: COMMERCIAL

## 2025-01-08 NOTE — PROGRESS NOTES
"Date:  1/9/2025    ?  Referring Provider:   Sue Arellano APRN,*    Copies of Letters to the Following:   Sue Arellano APRN,*    Chief Complaint:  I saw Camila Dawn at the [facility] for neuro-ophthalmic evaluation.   She is a 44 y.o. female with a history of PCOS, HTN, MS on Briumvi (ublituximab) who presents to re-establish in neuro-ophthalmology.    History:     HPI    Referred: Dr. Arellano     45 y/o female present to Neuro-ophthalmic clinic for MS. History of optic   neurits. She reports intermittent blurry vision only with MS flare-ups. No   changes with color vision. Occasional eye allergies and headaches. No   floaters, diplopia, or flashes of lights. She is currently taking infusion   treatments for MS.     Eyemeds  Saline OU PRN    Last edited by Nitin Jin on 1/9/2025  1:56 PM.          5/2017 [place]  "Patient here establishing care moving from [place]. Dx w/multiple sclerosis   since 2010. 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   Pt states taking Gilenya past year an half without any problems at this   time.  Hx of OS loss vision left   Peripherally which all came back.  No eye drops. No pain.     Notes in Media from [facility] eye clinic.    Multiple sclerosis  -     Posterior Segment OCT Optic Nerve- Both eyes  -     Hoover Visual Field - OU - Extended - Both Eyes     Partial optic atrophy of both eyes  -     Posterior Segment OCT Optic Nerve- Both eyes  -     Hoover Visual Field - OU - Extended - Both Eyes     Long-term current use of high risk medication other than anticoagulant  -     Posterior Segment OCT Optic Nerve- Both eyes        No evidence of Gilenya toxicity to retinas. Old partial optic atrophy in both eyes unchanged from prior exam in [place]. Repeat exam and OCT in one year.   "  ?  Current Outpatient Medications   Medication Sig Dispense Refill    alpha lipoic acid 100 mg Cap Take by mouth.      ascorbic acid, vitamin C, (VITAMIN C) " 500 MG tablet Take by mouth.      azelastine (ASTELIN) 137 mcg (0.1 %) nasal spray 2 sprays 2 (two) times daily.      b complex vitamins tablet Take 1 tablet by mouth once daily.      cinnamon bark (CINNAMON ORAL) Take by mouth.      clotrimazole-betamethasone 1-0.05% (LOTRISONE) cream Apply topically 2 (two) times daily.      fexofenadine (ALLEGRA) 180 MG tablet Take 180 mg by mouth once daily.      FLAXSEED ORAL Take by mouth.      fluconazole (DIFLUCAN) 150 MG Tab Take 150 mg by mouth.      fluticasone (FLONASE) 50 mcg/actuation nasal spray 1 spray by Each Nare route once daily.      ivermectin (SOOLANTRA) 1 % Crea Apply topically daily as needed.      ketoconazole (NIZORAL) 2 % cream Apply topically 2 (two) times daily.      ketoconazole (NIZORAL) 2 % shampoo Apply topically.      lisinopril (PRINIVIL,ZESTRIL) 40 MG tablet Take 40 mg by mouth once daily.   5    loratadine (CLARITIN) 10 mg tablet loratadine 10 mg tablet   Take 1 tablet every day by oral route as needed.      metformin (GLUCOPHAGE) 1000 MG tablet TK 1 T PO  BID  5    modafiniL (PROVIGIL) 200 MG Tab Take half tablet (100mg) in AM and half tablet (100mg) at noon. 15 tablet 0    multivitamin (THERAGRAN) per tablet Take 1 tablet by mouth once daily.      norgestimate-ethinyl estradiol (ORTHO TRI-CYCLEN,TRI-SPRINTEC) 0.18/0.215/0.25 mg-35 mcg (28) tablet Take 1 tablet by mouth once daily.      sodium,calcium,mag,pot oxybate (XYWAV) 0.5 gram/mL Soln Take 4.5 g by mouth every evening. Takes 4.5 g PO twice nightly.      triamcinolone acetonide 0.1% (KENALOG) 0.1 % cream Apply topically continuous prn.       varicella-zoster gE-AS01B, PF, (SHINGRIX, PF,) 50 mcg/0.5 mL injection Inject 0.5 mL into the muscle at month 0, then inject 0.5 mL into the muscle 2 months later 1 each 1    vitamin D 1000 units Tab Take 4,000 Units by mouth once daily.        No current facility-administered medications for this visit.     Review of patient's allergies indicates:    Allergen Reactions    Celebrex [celecoxib] Hives    Ragweed      Past Medical History:   Diagnosis Date    Hypertension     Multiple sclerosis     Narcolepsy without cataplexy(347.00)      History reviewed. No pertinent surgical history.  Family History   Problem Relation Name Age of Onset    Cancer Mother      Heart disease Father      Kidney disease Father       Social History     Socioeconomic History    Marital status:    Tobacco Use    Smoking status: Never    Smokeless tobacco: Never   Substance and Sexual Activity    Alcohol use: Yes     Comment: rarely    Drug use: No     Social Drivers of Health     Financial Resource Strain: Low Risk  (3/10/2024)    Overall Financial Resource Strain (CARDIA)     Difficulty of Paying Living Expenses: Not very hard   Food Insecurity: No Food Insecurity (3/10/2024)    Hunger Vital Sign     Worried About Running Out of Food in the Last Year: Never true     Ran Out of Food in the Last Year: Never true   Transportation Needs: No Transportation Needs (3/10/2024)    PRAPARE - Transportation     Lack of Transportation (Medical): No     Lack of Transportation (Non-Medical): No   Physical Activity: Insufficiently Active (3/10/2024)    Exercise Vital Sign     Days of Exercise per Week: 3 days     Minutes of Exercise per Session: 40 min   Stress: No Stress Concern Present (3/10/2024)    [place] [employer] - Occupational Stress Questionnaire     Feeling of Stress : Only a little   Housing Stability: Low Risk  (3/10/2024)    Housing Stability Vital Sign     Unable to Pay for Housing in the Last Year: No     Number of Places Lived in the Last Year: 1     Unstable Housing in the Last Year: No       Examination:  She was well-appearing. She was alert and oriented. Attention span and concentration were normal. Speech, language, memory, and general knowledge were intact.     Her distance visual acuity with correction was 20/25  in the right eye and 20/20  in the  left eye.  Her near visual acuity with correction was J1 in the right eye and J1 in the left eye.      She perceived 8/8 OD and 8/8 OS Ishihara color plates correctly. Pupils were brisk to light without an afferent defect. Ocular ductions were full. 4XP in primary, right, and left gaze by cross cover. There was no nystagmus. Saccades and pursuits were normal. Lids were symmetric.     Optic discs with temporal pallor OU. Pupillary dilation was not necessary for visualization of the optic disc today.     Laboratories Reviewed:      Latest Reference Range & Units 11/14/16 12:31 03/25/24 08:36 09/10/24 08:50   IgG 650 - 1600 mg/dL  562 (L) 616 (L)   IgM 50 - 300 mg/dL  54 53   IgA Level 40 - 350 mg/dL  49 31 (L)   NMO/AQP4 FACS,S Negative  Negative     (L): Data is abnormally low    I personally reviewed the above labs.  ?  Neuroimaging Reviewed:     9/2024 MRI brain demyelinating  Brain parenchyma stable in contour.  Scattered T2 FLAIR hyperintense lesions throughout the brain parenchyma again seen allowing for lesion multiplicity there is no definite new lesion.  There is no significant age advanced cerebral volume loss.  Ventricles stable without hydrocephalus.  No abnormal parenchymal susceptibility to suggest parenchymal hemorrhage.  There is no restricted diffusion with subtle diffusion hyperintensity associated with lesion in the left parietal periventricular white matter and left splenium of the corpus callosum similar to prior     No abnormal parenchymal enhancement     Impression:     No significant change from prior continued scattered T2 FLAIR hyperintense lesions throughout the brain parenchyma while nonspecific configuration remains concerning for mild moderate degree of prior demyelinating plaque     No definite new lesion or enhancing lesion to suggest significant interval or active demyelination     Clinical correlation and continued follow-up advised.    ?  Ocular Imaging, Photos, Records Reviewed:      OCT RNFL Today 1/9/2025:   Right Eye - Average RNFL 78 borderline superior and temporal thinning   Left Eye - Average RNFL 71 superior and temporal thinning     Normal macular architecture OU    Visual Field Test 24-2 OU Today 1/9/2025: Right Eye - fixation losses 0/10, false positives 0%, false negatives 0%, MD 0.52dB, Impression OD: full. Left Eye - fixation losses 11/13, false positives 13%, false negatives 3%, MD -0.33dB, Impression OS: few subtle SN points.  ?  Impression:  Camila Dawn has history of PCOS, HTN, MS on Briumvi who presents to establish in neuro-ophthalmology. They report left homonymous hemianopia as presenting complaint years ago, diagnosed with MS a few months later. She has transitioned to Briumvi from Gilenya since last visit in neuro-ophthalmology. Neuro-ophthalmologic examination was notable for excellent visual acuities, full color vision, normal ocular motility and small comitant exophoria. OCT with stable pattern of peripapillary thinning. Formal visual fields were full OD and OS.  ?  Plan:  1. Follow up in neuro-immunology clinic as planned  2. Follow up with optometry/ophthalmology for yearly routine eye exams and refraction needs    Follow-up:  I will see her in follow-up in 1 year or sooner with any change.  ?OCT and HVF  ?  Visit Checklist (as applicable):  1. Status of new and prior symptoms discussed? yes  2. Neuroimaging reviewed/ ordered as appropriate? yes  3. Ocular imaging and photos reviewed/ ordered as appropriate? yes  4. Plan for work-up and treatment discussed with patient? yes  5. Potential medication side-effects and monitoring plan discussed? N/a  6. Review of outside medical records was performed and pertinent details are summarized in the HPI above? yes    Time spent on this encounter: 45 minutes. This includes face to face time and non-face to face time preparing to see the patient (eg, review of tests), obtaining and/or reviewing separately obtained  history, documenting clinical information in the electronic or other health record, independently interpreting results and communicating results to the patient/family/caregiver, or care coordinator.      JAMAR Shafer  Neuro-Ophthalmology Consultant

## 2025-01-09 ENCOUNTER — OFFICE VISIT (OUTPATIENT)
Dept: OPHTHALMOLOGY | Facility: CLINIC | Age: 45
End: 2025-01-09
Payer: COMMERCIAL

## 2025-01-09 ENCOUNTER — CLINICAL SUPPORT (OUTPATIENT)
Dept: OPHTHALMOLOGY | Facility: CLINIC | Age: 45
End: 2025-01-09
Payer: COMMERCIAL

## 2025-01-09 DIAGNOSIS — H47.293 PARTIAL OPTIC ATROPHY OF BOTH EYES: Primary | ICD-10-CM

## 2025-01-09 DIAGNOSIS — G35 MULTIPLE SCLEROSIS: ICD-10-CM

## 2025-01-09 DIAGNOSIS — H53.15 VISUAL DISTORTIONS OF SHAPE AND SIZE: ICD-10-CM

## 2025-01-09 DIAGNOSIS — G35 MS (MULTIPLE SCLEROSIS): ICD-10-CM

## 2025-01-09 PROCEDURE — 99215 OFFICE O/P EST HI 40 MIN: CPT | Mod: S$GLB,,, | Performed by: STUDENT IN AN ORGANIZED HEALTH CARE EDUCATION/TRAINING PROGRAM

## 2025-01-09 PROCEDURE — 92083 EXTENDED VISUAL FIELD XM: CPT | Mod: S$GLB,,, | Performed by: STUDENT IN AN ORGANIZED HEALTH CARE EDUCATION/TRAINING PROGRAM

## 2025-01-09 PROCEDURE — 99999 PR PBB SHADOW E&M-EST. PATIENT-LVL IV: CPT | Mod: PBBFAC,,, | Performed by: STUDENT IN AN ORGANIZED HEALTH CARE EDUCATION/TRAINING PROGRAM

## 2025-01-09 PROCEDURE — 92133 CPTRZD OPH DX IMG PST SGM ON: CPT | Mod: S$GLB,,, | Performed by: STUDENT IN AN ORGANIZED HEALTH CARE EDUCATION/TRAINING PROGRAM

## 2025-01-09 PROCEDURE — 1159F MED LIST DOCD IN RCRD: CPT | Mod: CPTII,S$GLB,, | Performed by: STUDENT IN AN ORGANIZED HEALTH CARE EDUCATION/TRAINING PROGRAM

## 2025-01-09 NOTE — PROGRESS NOTES
HVF/OCT rel/fix coop good OD poor OS coop good OU/chart checked for latex allergy, pirate patch used OU/-1.50 + 0.75 x 31 OD -1.50 + 0.75 x 151 OS -BJ

## 2025-01-27 ENCOUNTER — OFFICE VISIT (OUTPATIENT)
Dept: NEUROLOGY | Facility: CLINIC | Age: 45
End: 2025-01-27
Payer: COMMERCIAL

## 2025-01-27 DIAGNOSIS — G35 MS (MULTIPLE SCLEROSIS): Primary | ICD-10-CM

## 2025-01-27 DIAGNOSIS — Z29.89 IMMUNOTHERAPY: ICD-10-CM

## 2025-01-27 DIAGNOSIS — Z71.89 COUNSELING REGARDING GOALS OF CARE: ICD-10-CM

## 2025-01-27 DIAGNOSIS — Z29.89 PROPHYLACTIC IMMUNOTHERAPY: ICD-10-CM

## 2025-01-27 NOTE — PROGRESS NOTES
The patient location is: home  The chief complaint leading to consultation is: MS    Visit type: audiovisual    Face to Face time with patient: 22  32 minutes of total time spent on the encounter, which includes face to face time and non-face to face time preparing to see the patient (eg, review of tests), Obtaining and/or reviewing separately obtained history, Documenting clinical information in the electronic or other health record, Independently interpreting results (not separately reported) and communicating results to the patient/family/caregiver, or Care coordination (not separately reported).         Each patient to whom he or she provides medical services by telemedicine is:  (1) informed of the relationship between the physician and patient and the respective role of any other health care provider with respect to management of the patient; and (2) notified that he or she may decline to receive medical services by telemedicine and may withdraw from such care at any time.    Notes:       Subjective:          Patient ID: Camila Dawn is a 44 y.o. female who presents today for a routine clinic visit for MS.      MS HPI:  DMT: Briumvi  March / September -   Side effects from DMT? No  Taking vitamin D3 as recommended? Yes -  Recently saw Dr. Worthington - all is stable   Feeling stable in general on Briumvi   Has not had a wart in about 6 months   Feeling anxious about current events  On Provigil 200mg    Medications:  Current Outpatient Medications   Medication Sig    alpha lipoic acid 100 mg Cap Take by mouth.    ascorbic acid, vitamin C, (VITAMIN C) 500 MG tablet Take by mouth.    azelastine (ASTELIN) 137 mcg (0.1 %) nasal spray 2 sprays 2 (two) times daily.    b complex vitamins tablet Take 1 tablet by mouth once daily.    cinnamon bark (CINNAMON ORAL) Take by mouth.    clotrimazole-betamethasone 1-0.05% (LOTRISONE) cream Apply topically 2 (two) times daily.    fexofenadine (ALLEGRA) 180 MG tablet Take 180  mg by mouth once daily.    FLAXSEED ORAL Take by mouth.    fluticasone (FLONASE) 50 mcg/actuation nasal spray 1 spray by Each Nare route once daily.    ivermectin (SOOLANTRA) 1 % Crea Apply topically daily as needed.    ketoconazole (NIZORAL) 2 % cream Apply topically 2 (two) times daily.    ketoconazole (NIZORAL) 2 % shampoo Apply topically.    lisinopril (PRINIVIL,ZESTRIL) 40 MG tablet Take 40 mg by mouth once daily.     loratadine (CLARITIN) 10 mg tablet loratadine 10 mg tablet   Take 1 tablet every day by oral route as needed.    metformin (GLUCOPHAGE) 1000 MG tablet TK 1 T PO  BID    modafiniL (PROVIGIL) 200 MG Tab Take half tablet (100mg) in AM and half tablet (100mg) at noon.    multivitamin (THERAGRAN) per tablet Take 1 tablet by mouth once daily.    norgestimate-ethinyl estradiol (ORTHO TRI-CYCLEN,TRI-SPRINTEC) 0.18/0.215/0.25 mg-35 mcg (28) tablet Take 1 tablet by mouth once daily.    sodium,calcium,mag,pot oxybate (XYWAV) 0.5 gram/mL Soln Take 4.5 g by mouth every evening. Takes 4.5 g PO twice nightly.    triamcinolone acetonide 0.1% (KENALOG) 0.1 % cream Apply topically continuous prn.     varicella-zoster gE-AS01B, PF, (SHINGRIX, PF,) 50 mcg/0.5 mL injection Inject 0.5 mL into the muscle at month 0, then inject 0.5 mL into the muscle 2 months later    vitamin D 1000 units Tab Take 4,000 Units by mouth once daily.      No current facility-administered medications for this visit.       SOCIAL HISTORY  Social History     Tobacco Use    Smoking status: Never    Smokeless tobacco: Never   Substance Use Topics    Alcohol use: Yes     Comment: rarely    Drug use: No       Living arrangements - the patient lives with their spouse.       Imaging:     Results for orders placed during the hospital encounter of 02/15/24    MRI Brain Demyelinating Without Contrast    Impression  Brain appears stable from prior exam, again demonstrating findings compatible with the reported history of multiple sclerosis.  No new  discrete lesions to indicate ongoing demyelination.    Cervical cord maintains normal signal intensity and contour.  No focal cord lesions to suggest prior demyelination.    Mild cervical spondylosis as above.    Diminutive left maxillary sinus, unchanged from comparison imaging.      Electronically signed by: Beltran Willson  Date:    02/15/2024  Time:    15:59    Results for orders placed during the hospital encounter of 02/15/24    MRI Cervical Spine Demyelinating Without Contrast    Impression  Brain appears stable from prior exam, again demonstrating findings compatible with the reported history of multiple sclerosis.  No new discrete lesions to indicate ongoing demyelination.    Cervical cord maintains normal signal intensity and contour.  No focal cord lesions to suggest prior demyelination.    Mild cervical spondylosis as above.    Diminutive left maxillary sinus, unchanged from comparison imaging.      Electronically signed by: Beltran Willson  Date:    02/15/2024  Time:    15:59    Results for orders placed during the hospital encounter of 03/08/24    MRI Thoracic Spine Demyelinating Without Contrast    Impression  No focal lesions identified in the thoracic spinal cord.      Electronically signed by: Devan Rodas MD  Date:    03/08/2024  Time:    16:10    Results for orders placed during the hospital encounter of 09/25/24    MRI Brain Demyelinating W W/O Contrast    Impression  No significant change from prior continued scattered T2 FLAIR hyperintense lesions throughout the brain parenchyma while nonspecific configuration remains concerning for mild moderate degree of prior demyelinating plaque    No definite new lesion or enhancing lesion to suggest significant interval or active demyelination    Clinical correlation and continued follow-up advised.    Electronically signed by resident: Alistair Garcia  Date:    09/25/2024  Time:    14:30    Electronically signed by: Frederic Escobedo  DO  Date:    09/25/2024  Time:    16:00          Labs:     Lab Results   Component Value Date    NEPZIDXV14EH 89 09/10/2024    IRSAHQIC57NH 65 08/10/2023    SORRBSXJ57LF 61 03/02/2022     Lab Results   Component Value Date    JCVINDEX 0.20 (A) 09/17/2019    JCVANTIBODY Indeterminate (A) 09/17/2019     Lab Results   Component Value Date    UJ7UCXUS 90.3 (H) 09/30/2024    ABSOLUTECD3 1839 09/30/2024    HA5HVBDT 61.9 (H) 09/30/2024    ABSOLUTECD8 1260 (H) 09/30/2024    KW6IFZII 28.1 09/30/2024    ABSOLUTECD4 572 09/30/2024    LABCD48 0.45 (L) 09/30/2024     Lab Results   Component Value Date    WBC 5.31 09/10/2024    RBC 4.21 09/10/2024    HGB 12.8 09/10/2024    HCT 38.9 09/10/2024    MCV 92 09/10/2024    MCH 30.4 09/10/2024    MCHC 32.9 09/10/2024    RDW 12.8 09/10/2024     09/10/2024    MPV 10.0 09/10/2024    GRAN 3.4 09/10/2024    GRAN 63.4 09/10/2024    LYMPH 1.1 09/10/2024    LYMPH 20.9 09/10/2024    MONO 0.7 09/10/2024    MONO 13.2 09/10/2024    EOS 0.1 09/10/2024    BASO 0.04 09/10/2024    EOSINOPHIL 1.5 09/10/2024    BASOPHIL 0.8 09/10/2024     Sodium   Date Value Ref Range Status   09/10/2024 138 136 - 145 mmol/L Final     Potassium   Date Value Ref Range Status   09/10/2024 3.6 3.5 - 5.1 mmol/L Final     Chloride   Date Value Ref Range Status   09/10/2024 103 95 - 110 mmol/L Final     CO2   Date Value Ref Range Status   09/10/2024 24 23 - 29 mmol/L Final     Glucose   Date Value Ref Range Status   09/10/2024 123 (H) 70 - 110 mg/dL Final     BUN   Date Value Ref Range Status   09/10/2024 11 6 - 20 mg/dL Final     Creatinine   Date Value Ref Range Status   09/10/2024 0.7 0.5 - 1.4 mg/dL Final     Calcium   Date Value Ref Range Status   09/10/2024 9.4 8.7 - 10.5 mg/dL Final     Total Protein   Date Value Ref Range Status   09/10/2024 6.6 6.0 - 8.4 g/dL Final     Albumin   Date Value Ref Range Status   09/10/2024 3.2 (L) 3.5 - 5.2 g/dL Final     Total Bilirubin   Date Value Ref Range Status   09/10/2024  0.3 0.1 - 1.0 mg/dL Final     Comment:     For infants and newborns, interpretation of results should be based  on gestational age, weight and in agreement with clinical  observations.    Premature Infant recommended reference ranges:  Up to 24 hours.............<8.0 mg/dL  Up to 48 hours............<12.0 mg/dL  3-5 days..................<15.0 mg/dL  6-29 days.................<15.0 mg/dL       Alkaline Phosphatase   Date Value Ref Range Status   09/10/2024 72 55 - 135 U/L Final     AST   Date Value Ref Range Status   09/10/2024 18 10 - 40 U/L Final     ALT   Date Value Ref Range Status   09/10/2024 17 10 - 44 U/L Final     Anion Gap   Date Value Ref Range Status   09/10/2024 11 8 - 16 mmol/L Final     eGFR if    Date Value Ref Range Status   03/26/2021 >60.0 >60 mL/min/1.73 m^2 Final     eGFR if non    Date Value Ref Range Status   03/26/2021 >60.0 >60 mL/min/1.73 m^2 Final     Comment:     Calculation used to obtain the estimated glomerular filtration  rate (eGFR) is the CKD-EPI equation.        Lab Results   Component Value Date    HEPBSAG Non-reactive 09/10/2024    HEPBSAB 23.44 03/14/2024    HEPBSAB Reactive 03/14/2024    HEPBCAB Non-reactive 09/10/2024           MS Impression and Plan:     NEURO MULTIPLE SCLEROSIS IMPRESSION:   Number of relapses in the past year?:  0  Clinical Progression:  Clinically Stable  MRI Progression:  Stable  MS Classification:  Relapsing-Remitting MS  Current DMT: ublituximab  DMT:  No change in management  Symptom Management:  No change in symptom management  Additional Impressions: Stable on Briumvi and tolerating  Warts are better off [employer]   Labs in February; next infusion in March   F/u Sue Peskin [facility] in 6mo         Problem List Items Addressed This Visit          1 - High    MS (multiple sclerosis) - Primary    Relevant Orders    CBC Auto Differential    Comprehensive Metabolic Panel    Immunoglobulins (IgG, IgA, IgM) Quantitative    HCG,  QUANTITATIVE, PREGNANCY    Hepatitis B Core Antibody, Total    Hepatitis B Surface Antigen     Other Visit Diagnoses       Immunotherapy        Relevant Orders    CBC Auto Differential    Comprehensive Metabolic Panel    Immunoglobulins (IgG, IgA, IgM) Quantitative    HCG, QUANTITATIVE, PREGNANCY    Hepatitis B Core Antibody, Total    Hepatitis B Surface Antigen    Prophylactic immunotherapy        Counseling regarding goals of care                Jazmine Lundy MD      Visit today included increased complexity associated with the care of the episodic problem : chronic immunotherapy; addressed and managing the longitudinal care of the patient due to the serious and/or complex managed problem(s) MS

## 2025-01-28 ENCOUNTER — TELEPHONE (OUTPATIENT)
Dept: NEUROLOGY | Facility: CLINIC | Age: 45
End: 2025-01-28
Payer: COMMERCIAL

## 2025-02-03 ENCOUNTER — TELEPHONE (OUTPATIENT)
Dept: NEUROLOGY | Facility: CLINIC | Age: 45
End: 2025-02-03
Payer: COMMERCIAL

## 2025-02-03 NOTE — TELEPHONE ENCOUNTER
----- Message from Jazmine Lundy MD sent at 1/27/2025  3:27 PM CST -----  [facility]; labs planned in Feb

## 2025-02-13 NOTE — TELEPHONE ENCOUNTER
Briumvi  Infusion scheduled: 3/14/25  Authorization: needs more visits - messaged preservices  Labs:   2/18/25  WBC 6.28  ALC 1262  AST 19 , ALT 14  Hcg -  IgG  618 IgM  56 IgA 48  HBsAg - anti-Hbc - , no current or past infection  Orders signed: will check with BB if ok to infuse

## 2025-02-18 ENCOUNTER — PATIENT MESSAGE (OUTPATIENT)
Dept: NEUROLOGY | Facility: CLINIC | Age: 45
End: 2025-02-18
Payer: COMMERCIAL

## 2025-02-18 ENCOUNTER — LAB VISIT (OUTPATIENT)
Dept: LAB | Facility: HOSPITAL | Age: 45
End: 2025-02-18
Attending: PSYCHIATRY & NEUROLOGY
Payer: COMMERCIAL

## 2025-02-18 DIAGNOSIS — Z29.89 IMMUNOTHERAPY: ICD-10-CM

## 2025-02-18 DIAGNOSIS — G35 MS (MULTIPLE SCLEROSIS): ICD-10-CM

## 2025-02-18 LAB
ALBUMIN SERPL BCP-MCNC: 3.7 G/DL (ref 3.5–5.2)
ALP SERPL-CCNC: 64 U/L (ref 40–150)
ALT SERPL W/O P-5'-P-CCNC: 14 U/L (ref 10–44)
ANION GAP SERPL CALC-SCNC: 10 MMOL/L (ref 8–16)
AST SERPL-CCNC: 19 U/L (ref 10–40)
BASOPHILS # BLD AUTO: 0.05 K/UL (ref 0–0.2)
BASOPHILS NFR BLD: 0.8 % (ref 0–1.9)
BILIRUB SERPL-MCNC: 0.3 MG/DL (ref 0.1–1)
BUN SERPL-MCNC: 12 MG/DL (ref 6–20)
CALCIUM SERPL-MCNC: 9.3 MG/DL (ref 8.7–10.5)
CHLORIDE SERPL-SCNC: 104 MMOL/L (ref 95–110)
CO2 SERPL-SCNC: 26 MMOL/L (ref 23–29)
CREAT SERPL-MCNC: 0.7 MG/DL (ref 0.5–1.4)
DIFFERENTIAL METHOD BLD: NORMAL
EOSINOPHIL # BLD AUTO: 0.1 K/UL (ref 0–0.5)
EOSINOPHIL NFR BLD: 1 % (ref 0–8)
ERYTHROCYTE [DISTWIDTH] IN BLOOD BY AUTOMATED COUNT: 12.9 % (ref 11.5–14.5)
EST. GFR  (NO RACE VARIABLE): >60 ML/MIN/1.73 M^2
GLUCOSE SERPL-MCNC: 95 MG/DL (ref 70–110)
HBV CORE AB SERPL QL IA: NORMAL
HBV SURFACE AG SERPL QL IA: NORMAL
HCG INTACT+B SERPL-ACNC: <1.2 MIU/ML
HCT VFR BLD AUTO: 44 % (ref 37–48.5)
HGB BLD-MCNC: 14.3 G/DL (ref 12–16)
IGA SERPL-MCNC: 48 MG/DL (ref 40–350)
IGG SERPL-MCNC: 618 MG/DL (ref 650–1600)
IGM SERPL-MCNC: 56 MG/DL (ref 50–300)
IMM GRANULOCYTES # BLD AUTO: 0.02 K/UL (ref 0–0.04)
IMM GRANULOCYTES NFR BLD AUTO: 0.3 % (ref 0–0.5)
LYMPHOCYTES # BLD AUTO: 1.3 K/UL (ref 1–4.8)
LYMPHOCYTES NFR BLD: 20.1 % (ref 18–48)
MCH RBC QN AUTO: 30.2 PG (ref 27–31)
MCHC RBC AUTO-ENTMCNC: 32.5 G/DL (ref 32–36)
MCV RBC AUTO: 93 FL (ref 82–98)
MONOCYTES # BLD AUTO: 0.8 K/UL (ref 0.3–1)
MONOCYTES NFR BLD: 12.1 % (ref 4–15)
NEUTROPHILS # BLD AUTO: 4.1 K/UL (ref 1.8–7.7)
NEUTROPHILS NFR BLD: 65.7 % (ref 38–73)
NRBC BLD-RTO: 0 /100 WBC
PLATELET # BLD AUTO: 394 K/UL (ref 150–450)
PMV BLD AUTO: 10 FL (ref 9.2–12.9)
POTASSIUM SERPL-SCNC: 4.1 MMOL/L (ref 3.5–5.1)
PROT SERPL-MCNC: 7.3 G/DL (ref 6–8.4)
RBC # BLD AUTO: 4.73 M/UL (ref 4–5.4)
SODIUM SERPL-SCNC: 140 MMOL/L (ref 136–145)
WBC # BLD AUTO: 6.28 K/UL (ref 3.9–12.7)

## 2025-02-18 PROCEDURE — 85025 COMPLETE CBC W/AUTO DIFF WBC: CPT | Performed by: PSYCHIATRY & NEUROLOGY

## 2025-02-18 PROCEDURE — 82784 ASSAY IGA/IGD/IGG/IGM EACH: CPT | Mod: 59 | Performed by: PSYCHIATRY & NEUROLOGY

## 2025-02-18 PROCEDURE — 36415 COLL VENOUS BLD VENIPUNCTURE: CPT | Performed by: PSYCHIATRY & NEUROLOGY

## 2025-02-18 PROCEDURE — 84702 CHORIONIC GONADOTROPIN TEST: CPT | Performed by: PSYCHIATRY & NEUROLOGY

## 2025-02-18 PROCEDURE — 87340 HEPATITIS B SURFACE AG IA: CPT | Performed by: PSYCHIATRY & NEUROLOGY

## 2025-02-18 PROCEDURE — 86704 HEP B CORE ANTIBODY TOTAL: CPT | Performed by: PSYCHIATRY & NEUROLOGY

## 2025-02-18 PROCEDURE — 80053 COMPREHEN METABOLIC PANEL: CPT | Performed by: PSYCHIATRY & NEUROLOGY

## 2025-02-24 ENCOUNTER — PATIENT MESSAGE (OUTPATIENT)
Dept: NEUROLOGY | Facility: CLINIC | Age: 45
End: 2025-02-24
Payer: COMMERCIAL

## 2025-02-27 ENCOUNTER — PATIENT MESSAGE (OUTPATIENT)
Dept: NEUROLOGY | Facility: CLINIC | Age: 45
End: 2025-02-27
Payer: COMMERCIAL

## 2025-03-07 ENCOUNTER — PATIENT MESSAGE (OUTPATIENT)
Dept: PSYCHIATRY | Facility: CLINIC | Age: 45
End: 2025-03-07
Payer: COMMERCIAL

## 2025-04-14 ENCOUNTER — PATIENT MESSAGE (OUTPATIENT)
Dept: NEUROLOGY | Facility: CLINIC | Age: 45
End: 2025-04-14
Payer: COMMERCIAL

## 2025-05-13 ENCOUNTER — PATIENT MESSAGE (OUTPATIENT)
Dept: PSYCHIATRY | Facility: CLINIC | Age: 45
End: 2025-05-13
Payer: COMMERCIAL

## 2025-06-19 ENCOUNTER — PATIENT MESSAGE (OUTPATIENT)
Dept: PSYCHIATRY | Facility: CLINIC | Age: 45
End: 2025-06-19
Payer: COMMERCIAL

## 2025-07-30 ENCOUNTER — OFFICE VISIT (OUTPATIENT)
Dept: NEUROLOGY | Facility: CLINIC | Age: 45
End: 2025-07-30
Payer: COMMERCIAL

## 2025-07-30 ENCOUNTER — PATIENT MESSAGE (OUTPATIENT)
Dept: PSYCHIATRY | Facility: CLINIC | Age: 45
End: 2025-07-30
Payer: COMMERCIAL

## 2025-07-30 ENCOUNTER — LAB VISIT (OUTPATIENT)
Dept: LAB | Facility: HOSPITAL | Age: 45
End: 2025-07-30
Payer: COMMERCIAL

## 2025-07-30 VITALS
HEIGHT: 66 IN | SYSTOLIC BLOOD PRESSURE: 117 MMHG | DIASTOLIC BLOOD PRESSURE: 80 MMHG | WEIGHT: 167.56 LBS | BODY MASS INDEX: 26.93 KG/M2 | HEART RATE: 89 BPM

## 2025-07-30 DIAGNOSIS — Z79.899 HIGH RISK MEDICATION USE: ICD-10-CM

## 2025-07-30 DIAGNOSIS — G35 MULTIPLE SCLEROSIS: Primary | ICD-10-CM

## 2025-07-30 DIAGNOSIS — Z71.89 COUNSELING REGARDING GOALS OF CARE: ICD-10-CM

## 2025-07-30 DIAGNOSIS — G35 MULTIPLE SCLEROSIS: ICD-10-CM

## 2025-07-30 DIAGNOSIS — Z29.89 PROPHYLACTIC IMMUNOTHERAPY: ICD-10-CM

## 2025-07-30 LAB
ABSOLUTE EOSINOPHIL (OHS): 0.08 K/UL
ABSOLUTE MONOCYTE (OHS): 1.09 K/UL (ref 0.3–1)
ABSOLUTE NEUTROPHIL COUNT (OHS): 4.85 K/UL (ref 1.8–7.7)
ALBUMIN SERPL BCP-MCNC: 3.5 G/DL (ref 3.5–5.2)
ALP SERPL-CCNC: 72 UNIT/L (ref 40–150)
ALT SERPL W/O P-5'-P-CCNC: 13 UNIT/L (ref 0–55)
ANION GAP (OHS): 12 MMOL/L (ref 8–16)
AST SERPL-CCNC: 20 UNIT/L (ref 0–50)
BASOPHILS # BLD AUTO: 0.06 K/UL
BASOPHILS NFR BLD AUTO: 0.8 %
BILIRUB SERPL-MCNC: 0.2 MG/DL (ref 0.1–1)
BUN SERPL-MCNC: 16 MG/DL (ref 6–20)
CALCIUM SERPL-MCNC: 9.3 MG/DL (ref 8.7–10.5)
CHLORIDE SERPL-SCNC: 104 MMOL/L (ref 95–110)
CO2 SERPL-SCNC: 24 MMOL/L (ref 23–29)
CREAT SERPL-MCNC: 0.7 MG/DL (ref 0.5–1.4)
ERYTHROCYTE [DISTWIDTH] IN BLOOD BY AUTOMATED COUNT: 12.4 % (ref 11.5–14.5)
GFR SERPLBLD CREATININE-BSD FMLA CKD-EPI: >60 ML/MIN/1.73/M2
GLUCOSE SERPL-MCNC: 87 MG/DL (ref 70–110)
HBV CORE AB SERPL QL IA: NORMAL
HBV SURFACE AG SERPL QL IA: NORMAL
HCG INTACT+B SERPL-ACNC: <2.42 MIU/ML
HCT VFR BLD AUTO: 40.9 % (ref 37–48.5)
HGB BLD-MCNC: 13.2 GM/DL (ref 12–16)
IGA SERPL-MCNC: 46 MG/DL (ref 40–350)
IGG SERPL-MCNC: 599 MG/DL (ref 650–1600)
IGM SERPL-MCNC: 49 MG/DL (ref 50–300)
IMM GRANULOCYTES # BLD AUTO: 0.04 K/UL (ref 0–0.04)
IMM GRANULOCYTES NFR BLD AUTO: 0.5 % (ref 0–0.5)
LYMPHOCYTES # BLD AUTO: 1.36 K/UL (ref 1–4.8)
MCH RBC QN AUTO: 30.4 PG (ref 27–31)
MCHC RBC AUTO-ENTMCNC: 32.3 G/DL (ref 32–36)
MCV RBC AUTO: 94 FL (ref 82–98)
NUCLEATED RBC (/100WBC) (OHS): 0 /100 WBC
PLATELET # BLD AUTO: 488 K/UL (ref 150–450)
PMV BLD AUTO: 10.5 FL (ref 9.2–12.9)
POTASSIUM SERPL-SCNC: 4.1 MMOL/L (ref 3.5–5.1)
PROT SERPL-MCNC: 6.8 GM/DL (ref 6–8.4)
RBC # BLD AUTO: 4.34 M/UL (ref 4–5.4)
RELATIVE EOSINOPHIL (OHS): 1.1 %
RELATIVE LYMPHOCYTE (OHS): 18.2 % (ref 18–48)
RELATIVE MONOCYTE (OHS): 14.6 % (ref 4–15)
RELATIVE NEUTROPHIL (OHS): 64.8 % (ref 38–73)
SODIUM SERPL-SCNC: 140 MMOL/L (ref 136–145)
WBC # BLD AUTO: 7.48 K/UL (ref 3.9–12.7)

## 2025-07-30 PROCEDURE — 82040 ASSAY OF SERUM ALBUMIN: CPT

## 2025-07-30 PROCEDURE — 84702 CHORIONIC GONADOTROPIN TEST: CPT

## 2025-07-30 PROCEDURE — 82784 ASSAY IGA/IGD/IGG/IGM EACH: CPT | Mod: 59

## 2025-07-30 PROCEDURE — 85025 COMPLETE CBC W/AUTO DIFF WBC: CPT

## 2025-07-30 PROCEDURE — 36415 COLL VENOUS BLD VENIPUNCTURE: CPT

## 2025-07-30 PROCEDURE — 1159F MED LIST DOCD IN RCRD: CPT | Mod: CPTII,S$GLB,, | Performed by: CLINICAL NURSE SPECIALIST

## 2025-07-30 PROCEDURE — 3074F SYST BP LT 130 MM HG: CPT | Mod: CPTII,S$GLB,, | Performed by: CLINICAL NURSE SPECIALIST

## 2025-07-30 PROCEDURE — 4010F ACE/ARB THERAPY RXD/TAKEN: CPT | Mod: CPTII,S$GLB,, | Performed by: CLINICAL NURSE SPECIALIST

## 2025-07-30 PROCEDURE — G2211 COMPLEX E/M VISIT ADD ON: HCPCS | Mod: S$GLB,,, | Performed by: CLINICAL NURSE SPECIALIST

## 2025-07-30 PROCEDURE — 99215 OFFICE O/P EST HI 40 MIN: CPT | Mod: S$GLB,,, | Performed by: CLINICAL NURSE SPECIALIST

## 2025-07-30 PROCEDURE — 3008F BODY MASS INDEX DOCD: CPT | Mod: CPTII,S$GLB,, | Performed by: CLINICAL NURSE SPECIALIST

## 2025-07-30 PROCEDURE — 87340 HEPATITIS B SURFACE AG IA: CPT

## 2025-07-30 PROCEDURE — 99999 PR PBB SHADOW E&M-EST. PATIENT-LVL V: CPT | Mod: PBBFAC,,, | Performed by: CLINICAL NURSE SPECIALIST

## 2025-07-30 PROCEDURE — 3079F DIAST BP 80-89 MM HG: CPT | Mod: CPTII,S$GLB,, | Performed by: CLINICAL NURSE SPECIALIST

## 2025-07-30 PROCEDURE — 86704 HEP B CORE ANTIBODY TOTAL: CPT

## 2025-07-30 NOTE — PROGRESS NOTES
"Subjective:          Patient ID: Camila Dawn is a 45 y.o. female who presents today for a routine clinic visit for MS.  She was last seen in January by Dr. Lundy. The history has been provided by the patient. She is accompanied by her , Declan.     MS HPI:  DMT: Briumvi--March and September  Side effects from DMT? No   Taking vitamin D3 as recommended? Yes--4000 units daily   She had a fall last Tuesday. She had some numbness to the right foot when her legs were crossed. She stood and took a step, but then her right leg buckled, and she fell. She fell onto her knees and has some bruising to both knees today.  Her  helped her to get up, but she had no issues with ambulation after that.  She has some soreness to the right ankle, but she doesn't think it's broken.   She denies any recent infections.   Bowels have been normal. She is emptying her bladder completely.   She is trying to get back in to exercise. She recently quit her gym membership because times of classes are not convenient. She does YouTube yoga or exercise videos, but plans to be more consistent.   She has been avoiding the extreme heat. She tries to "conserve spoons" (re: MS fatigue spoon theory) by regulating activities. Showering is exhausting for her.   She denies any recent warts.     Medications:  Current Outpatient Medications   Medication Sig    alpha lipoic acid 100 mg Cap Take by mouth.    ascorbic acid, vitamin C, (VITAMIN C) 500 MG tablet Take by mouth.    azelastine (ASTELIN) 137 mcg (0.1 %) nasal spray 2 sprays 2 (two) times daily.    b complex vitamins tablet Take 1 tablet by mouth once daily.    cinnamon bark (CINNAMON ORAL) Take by mouth.    clotrimazole-betamethasone 1-0.05% (LOTRISONE) cream Apply topically 2 (two) times daily.    fexofenadine (ALLEGRA) 180 MG tablet Take 180 mg by mouth once daily.    FLAXSEED ORAL Take by mouth.    fluticasone (FLONASE) 50 mcg/actuation nasal spray 1 spray by Each Nare route " once daily.    ivermectin (SOOLANTRA) 1 % Crea Apply topically daily as needed.    ketoconazole (NIZORAL) 2 % cream Apply topically 2 (two) times daily.    ketoconazole (NIZORAL) 2 % shampoo Apply topically.    lisinopril (PRINIVIL,ZESTRIL) 40 MG tablet Take 40 mg by mouth once daily.     loratadine (CLARITIN) 10 mg tablet loratadine 10 mg tablet   Take 1 tablet every day by oral route as needed.    metformin (GLUCOPHAGE) 1000 MG tablet TK 1 T PO  BID    modafiniL (PROVIGIL) 200 MG Tab Take half tablet (100mg) in AM and half tablet (100mg) at noon.    multivitamin (THERAGRAN) per tablet Take 1 tablet by mouth once daily.    norgestimate-ethinyl estradiol (ORTHO TRI-CYCLEN,TRI-SPRINTEC) 0.18/0.215/0.25 mg-35 mcg (28) tablet Take 1 tablet by mouth once daily.    sodium,calcium,mag,pot oxybate (XYWAV) 0.5 gram/mL Soln Take 4.5 g by mouth every evening. Takes 4.5 g PO twice nightly.    triamcinolone acetonide 0.1% (KENALOG) 0.1 % cream Apply topically continuous prn.     varicella-zoster gE-AS01B, PF, (SHINGRIX, PF,) 50 mcg/0.5 mL injection Inject 0.5 mL into the muscle at month 0, then inject 0.5 mL into the muscle 2 months later    vitamin D 1000 units Tab Take 4,000 Units by mouth once daily.      No current facility-administered medications for this visit.       SOCIAL HISTORY  Social History[1]    Living arrangements - the patient lives with her      ROS:      7/28/2025     1:34 PM   REVIEW OF SYMPTOMS   Do you feel abnormally tired on most days? No--fatigue is her baseline; worse during menstruation; morning time is better for her; takes modafinil    Do you feel you generally sleep well? Yes   Do you have difficulty controlling your bladder?  No   Do you have difficulty controlling your bowels?  No   Do you have frequent muscle cramps, tightness or spasms in your limbs?  No--a few rare spasms, didn't last long; muscles were tight after a hike    Do you have new visual symptoms?  No--wears glasses; needs to  "see eye doctor    Do you have worsening difficulty with your memory or thinking? No--uses wrong words sometimes; messed up taking birth control pills last month (didn't set up her meds right). She uses a pill case.    Do you have worsening symptoms of anxiety or depression?  No--she always has anxiety; situational depression; feels lonely in [place]   For patients who walk, Do you have more difficulty walking?  No   Have you fallen since your last visit?  Yes   For patients who use wheelchairs: Do you have any skin wounds or breakdown? Not Applicable   Do you have difficulty using your hands?  No; drops things sometimes; has been called "clumsy" since she was a child    Do you have shooting or burning pain? No   Do you have difficulty with sexual function?  No   If you are sexually active, are you using birth control? Y/N  N/A Yes   Do you often choke when swallowing liquids or solid food?  No--chokes on saliva; not worrisome to her   Do you experience worsening symptoms when overheated? Yes--has a cooling vest    Do you need any new equipment such as a wheelchair, walker or shower chair? No   Do you receive co-pay financial assistance for your principal MS medicine? Yes   Would you be interested in participating in an MS research trial in the future? No   For patients on Gilenya, Tecfidera, Aubagio, Rituxan, Ocrevus, Tysabri, Lemtrada or Methotrexate, are you aware that you should NOT receive live virus vaccines?  Discussed with patient    Do you feel you have adequate family/friend support?  Yes   Do you have health insurance?   Yes   Are you currently employed? No   Do you receive SSDI/SSI?  No   Do you use marijuana or cannabis products? No   Have you been diagnosed with a urinary tract infection since your last visit here? No   Have you been diagnosed with a respiratory tract infection since your last visit here? No   Have you been to the emergency room since your last visit here? No   Have you been " hospitalized since your last visit here?  No            Objective:        1. 25 foot timed walk:      2024     3:00 PM 2025     2:30 PM   Timed 25 Foot Walk:   Did patient wear an AFO? No No   Was assistive device used? No No   Time for 25 Foot Walk (seconds) 4.1 3.3   Time for 25 Foot Walk (seconds) 4.2 3.9       Neurological Exam    Mental Status   Oriented to person, place, and time.   Attention: normal. Concentration: normal.   Speech: speech is normal   Level of consciousness: alert  Knowledge: good.   Normal comprehension.      Cranial Nerves      CN II   Visual acuity: (20/20 OD and 20/20 with correction)     CN III, IV, VI   Extraocular motions are normal.      CN V   Facial sensation intact.      CN VII   Facial expression full, symmetric.      CN VIII   Hearing: intact (to finger rub)     CN IX, X   Palate: symmetric     CN XI   CN XI normal.   Right sternocleidomastoid strength: normal  Left sternocleidomastoid strength: normal  Right trapezius strength: normal  Left trapezius strength: normal     CN XII   Tongue deviation: none     Motor Exam   Muscle bulk: normal  Overall muscle tone: normal     Strength   Right neck flexion: 5/5  Left neck flexion: 5/5  Right neck extension: 5/5  Left neck extension: 5/5  Right deltoid: 5/5  Left deltoid: 5/5  Right biceps: 5/5  Left biceps: 5/5  Right triceps: 5/5  Left triceps: 5/5  Right wrist flexion: 5/5  Left wrist flexion: 5/5  Right wrist extension: 5/5  Left wrist extension: 5/5  Right interossei: 5/5  Left interossei: 5/5  Right iliopsoas: 5/5  Left iliopsoas: 5/5  Right quadriceps: 5/5  Left quadriceps: 5/5  Right hamstrin/5  Left hamstrin/5  Right anterior tibial: 5/5  Left anterior tibial: 5/5  Right gastroc: 5/5  Left gastroc: 5/5     Sensory Exam   Right arm vibration: normal  Left arm vibration: normal  Right leg vibration: diminished in feet   Left leg vibration: diminished in feet      Gait, Coordination, and Reflexes       Gait  Gait: normal     Coordination   Romberg: negative  Finger to nose coordination: normal  Heel to shin coordination: normal  Tandem walking coordination: normal     Tremor   Resting tremor: absent     Reflexes   Right brachioradialis: 1+  Left brachioradialis: 1+  Right biceps: 1+  Left biceps: 1+  Right triceps: 1+  Left triceps: 1+  Right achilles: 1+  Left achilles: 1+  Right plantar: equivocal  Left plantar: equivocal  She can walk on toes and heels.   Patient able to hop on each foot ten times.   Normal RSM in UE and LE         Imaging:       Results for orders placed during the hospital encounter of 02/15/24    MRI Cervical Spine Demyelinating Without Contrast    Impression  Brain appears stable from prior exam, again demonstrating findings compatible with the reported history of multiple sclerosis.  No new discrete lesions to indicate ongoing demyelination.    Cervical cord maintains normal signal intensity and contour.  No focal cord lesions to suggest prior demyelination.    Mild cervical spondylosis as above.    Diminutive left maxillary sinus, unchanged from comparison imaging.      Electronically signed by: Beltran Willson  Date:    02/15/2024  Time:    15:59    Results for orders placed during the hospital encounter of 03/08/24    MRI Thoracic Spine Demyelinating Without Contrast    Impression  No focal lesions identified in the thoracic spinal cord.      Electronically signed by: Devan Rodas MD  Date:    03/08/2024  Time:    16:10    Results for orders placed during the hospital encounter of 09/25/24    MRI Brain Demyelinating W W/O Contrast    Impression  No significant change from prior continued scattered T2 FLAIR hyperintense lesions throughout the brain parenchyma while nonspecific configuration remains concerning for mild moderate degree of prior demyelinating plaque    No definite new lesion or enhancing lesion to suggest significant interval or active demyelination    Clinical correlation  and continued follow-up advised.    Electronically signed by resident: Alistair Garcia  Date:    09/25/2024  Time:    14:30    Electronically signed by: Frederic Escobedo DO  Date:    09/25/2024  Time:    16:00        Labs:     Lab Results   Component Value Date    CWQFIJRS74IV 89 09/10/2024    ABZHLKRZ24IK 65 08/10/2023    QZXCPWQY58VI 61 03/02/2022     Lab Results   Component Value Date    JCVINDEX 0.20 (A) 09/17/2019    JCVANTIBODY Indeterminate (A) 09/17/2019     Lab Results   Component Value Date    ED3FXPPW 90.3 (H) 09/30/2024    ABSOLUTECD3 1839 09/30/2024    SP5XWBLP 61.9 (H) 09/30/2024    ABSOLUTECD8 1260 (H) 09/30/2024    SB7GEBOO 28.1 09/30/2024    ABSOLUTECD4 572 09/30/2024    LABCD48 0.45 (L) 09/30/2024     Lab Results   Component Value Date    WBC 6.28 02/18/2025    RBC 4.73 02/18/2025    HGB 14.3 02/18/2025    HCT 44.0 02/18/2025    MCV 93 02/18/2025    MCH 30.2 02/18/2025    MCHC 32.5 02/18/2025    RDW 12.9 02/18/2025     02/18/2025    MPV 10.0 02/18/2025    GRAN 4.1 02/18/2025    GRAN 65.7 02/18/2025    LYMPH 1.3 02/18/2025    LYMPH 20.1 02/18/2025    MONO 0.8 02/18/2025    MONO 12.1 02/18/2025    EOS 0.1 02/18/2025    BASO 0.05 02/18/2025    EOSINOPHIL 1.0 02/18/2025    BASOPHIL 0.8 02/18/2025     Sodium   Date Value Ref Range Status   02/18/2025 140 136 - 145 mmol/L Final     Potassium   Date Value Ref Range Status   02/18/2025 4.1 3.5 - 5.1 mmol/L Final     Chloride   Date Value Ref Range Status   02/18/2025 104 95 - 110 mmol/L Final     CO2   Date Value Ref Range Status   02/18/2025 26 23 - 29 mmol/L Final     Glucose   Date Value Ref Range Status   02/18/2025 95 70 - 110 mg/dL Final     BUN   Date Value Ref Range Status   02/18/2025 12 6 - 20 mg/dL Final     Creatinine   Date Value Ref Range Status   02/18/2025 0.7 0.5 - 1.4 mg/dL Final     Calcium   Date Value Ref Range Status   02/18/2025 9.3 8.7 - 10.5 mg/dL Final     Total Protein   Date Value Ref Range Status   02/18/2025 7.3 6.0 - 8.4  g/dL Final     Albumin   Date Value Ref Range Status   02/18/2025 3.7 3.5 - 5.2 g/dL Final     Total Bilirubin   Date Value Ref Range Status   02/18/2025 0.3 0.1 - 1.0 mg/dL Final     Comment:     For infants and newborns, interpretation of results should be based  on gestational age, weight and in agreement with clinical  observations.    Premature Infant recommended reference ranges:  Up to 24 hours.............<8.0 mg/dL  Up to 48 hours............<12.0 mg/dL  3-5 days..................<15.0 mg/dL  6-29 days.................<15.0 mg/dL       Alkaline Phosphatase   Date Value Ref Range Status   02/18/2025 64 40 - 150 U/L Final     AST   Date Value Ref Range Status   02/18/2025 19 10 - 40 U/L Final     ALT   Date Value Ref Range Status   02/18/2025 14 10 - 44 U/L Final     Anion Gap   Date Value Ref Range Status   02/18/2025 10 8 - 16 mmol/L Final     eGFR if    Date Value Ref Range Status   03/26/2021 >60.0 >60 mL/min/1.73 m^2 Final     eGFR if non    Date Value Ref Range Status   03/26/2021 >60.0 >60 mL/min/1.73 m^2 Final     Comment:     Calculation used to obtain the estimated glomerular filtration  rate (eGFR) is the CKD-EPI equation.        Lab Results   Component Value Date    HEPBSAG Non-reactive 02/18/2025    HEPBSAB 23.44 03/14/2024    HEPBSAB Reactive 03/14/2024    HEPBCAB Non-reactive 02/18/2025           MS Impression and Plan:     NEURO MULTIPLE SCLEROSIS IMPRESSION:   Number of relapses in the past year?:  0  Clinical Progression:  Clinically Stable  MS Classification:  Relapsing-Remitting MS  Current DMT: ublituximab  Current DMT comment: Continue Briumvi and Vitamin D. Infusion is scheduled 9/2/25; we will check safety labs today. She is aware of the risks associated with immunosuppressant therapy, including increased risk of infection.     DMT:  No change in management  Symptom Management:  No change in symptom management  Additional Impressions:   MRI brain is due  in September.   She will follow up with Dr. Lundy in 6 months in person.   The visit today is associated with current or anticipated ongoing medical care related to this patient's single serious condition/complex condition of multiple sclerosis.      Total time spent with patient: 37 minutes   Total time spent on encounter: 47 minutes         Problem List Items Addressed This Visit    None  Visit Diagnoses         Multiple sclerosis    -  Primary    Relevant Orders    CBC Auto Differential    Comprehensive Metabolic Panel    Hepatitis B Core Antibody, Total    Hepatitis B Surface Antigen    Immunoglobulins (IgG, IgA, IgM) Quantitative    HCG, Quantitative    MRI Brain Demyelinating Without Contrast      Counseling regarding goals of care          Prophylactic immunotherapy          High risk medication use                Sue Arellano APRN, CNS         [1]   Social History  Tobacco Use    Smoking status: Never    Smokeless tobacco: Never   Substance Use Topics    Alcohol use: Yes     Comment: rarely    Drug use: No

## 2025-07-31 ENCOUNTER — TELEPHONE (OUTPATIENT)
Dept: NEUROLOGY | Facility: CLINIC | Age: 45
End: 2025-07-31
Payer: COMMERCIAL

## 2025-07-31 NOTE — TELEPHONE ENCOUNTER
----- Message from Sue Arellano sent at 7/30/2025  3:37 PM CDT -----  Last infused 3/18, scheduled 9/2 (24 weeks); she asked that we check her auth a few weeks prior, as infusion center had to be changed last time. Labs today.

## 2025-08-01 ENCOUNTER — PATIENT MESSAGE (OUTPATIENT)
Dept: NEUROLOGY | Facility: CLINIC | Age: 45
End: 2025-08-01
Payer: COMMERCIAL

## 2025-08-01 ENCOUNTER — PATIENT MESSAGE (OUTPATIENT)
Dept: PSYCHIATRY | Facility: CLINIC | Age: 45
End: 2025-08-01
Payer: COMMERCIAL

## 2025-08-01 DIAGNOSIS — D75.839 THROMBOCYTOSIS: Primary | ICD-10-CM

## 2025-08-07 ENCOUNTER — LAB VISIT (OUTPATIENT)
Dept: LAB | Facility: HOSPITAL | Age: 45
End: 2025-08-07
Attending: CLINICAL NURSE SPECIALIST
Payer: COMMERCIAL

## 2025-08-07 ENCOUNTER — PATIENT MESSAGE (OUTPATIENT)
Dept: NEUROLOGY | Facility: CLINIC | Age: 45
End: 2025-08-07
Payer: COMMERCIAL

## 2025-08-07 DIAGNOSIS — D75.839 THROMBOCYTOSIS: ICD-10-CM

## 2025-08-07 LAB
ABSOLUTE EOSINOPHIL (OHS): 0.06 K/UL
ABSOLUTE MONOCYTE (OHS): 0.98 K/UL (ref 0.3–1)
ABSOLUTE NEUTROPHIL COUNT (OHS): 4.19 K/UL (ref 1.8–7.7)
BASOPHILS # BLD AUTO: 0.09 K/UL
BASOPHILS NFR BLD AUTO: 1.3 %
ERYTHROCYTE [DISTWIDTH] IN BLOOD BY AUTOMATED COUNT: 12.6 % (ref 11.5–14.5)
HCT VFR BLD AUTO: 40.7 % (ref 37–48.5)
HGB BLD-MCNC: 13.4 GM/DL (ref 12–16)
IMM GRANULOCYTES # BLD AUTO: 0.05 K/UL (ref 0–0.04)
IMM GRANULOCYTES NFR BLD AUTO: 0.7 % (ref 0–0.5)
LYMPHOCYTES # BLD AUTO: 1.6 K/UL (ref 1–4.8)
MCH RBC QN AUTO: 30.4 PG (ref 27–31)
MCHC RBC AUTO-ENTMCNC: 32.9 G/DL (ref 32–36)
MCV RBC AUTO: 92 FL (ref 82–98)
NUCLEATED RBC (/100WBC) (OHS): 0 /100 WBC
PLATELET # BLD AUTO: 397 K/UL (ref 150–450)
PMV BLD AUTO: 10.2 FL (ref 9.2–12.9)
RBC # BLD AUTO: 4.41 M/UL (ref 4–5.4)
RELATIVE EOSINOPHIL (OHS): 0.9 %
RELATIVE LYMPHOCYTE (OHS): 23 % (ref 18–48)
RELATIVE MONOCYTE (OHS): 14.1 % (ref 4–15)
RELATIVE NEUTROPHIL (OHS): 60 % (ref 38–73)
WBC # BLD AUTO: 6.97 K/UL (ref 3.9–12.7)

## 2025-08-07 PROCEDURE — 85025 COMPLETE CBC W/AUTO DIFF WBC: CPT

## 2025-08-07 PROCEDURE — 36415 COLL VENOUS BLD VENIPUNCTURE: CPT

## 2025-08-26 ENCOUNTER — PATIENT MESSAGE (OUTPATIENT)
Dept: NEUROLOGY | Facility: CLINIC | Age: 45
End: 2025-08-26
Payer: COMMERCIAL